# Patient Record
Sex: FEMALE | Race: WHITE | NOT HISPANIC OR LATINO | Employment: OTHER | URBAN - METROPOLITAN AREA
[De-identification: names, ages, dates, MRNs, and addresses within clinical notes are randomized per-mention and may not be internally consistent; named-entity substitution may affect disease eponyms.]

---

## 2017-01-03 ENCOUNTER — TELEPHONE (OUTPATIENT)
Dept: INTERNAL MEDICINE | Facility: CLINIC | Age: 62
End: 2017-01-03

## 2017-01-03 RX ORDER — TRAZODONE HYDROCHLORIDE 100 MG/1
100 TABLET ORAL NIGHTLY
Qty: 90 TABLET | Refills: 1 | Status: SHIPPED | OUTPATIENT
Start: 2017-01-03 | End: 2017-07-18 | Stop reason: SDUPTHER

## 2017-01-03 NOTE — TELEPHONE ENCOUNTER
----- Message from Reddy Gomez sent at 12/30/2016 10:13 AM EST -----  Contact: Verónica Tejada Renewal:  Trazodone 100 mg.  Was taking 1/2 tab but went back to 1 tab at night as she felt the need for it, her script has run out early.  New script will need to be written. [address]  Verónica [facility]) [phone]

## 2017-02-06 ENCOUNTER — OFFICE VISIT (OUTPATIENT)
Dept: INTERNAL MEDICINE | Facility: CLINIC | Age: 62
End: 2017-02-06

## 2017-02-06 VITALS
OXYGEN SATURATION: 97 % | RESPIRATION RATE: 16 BRPM | SYSTOLIC BLOOD PRESSURE: 120 MMHG | HEART RATE: 100 BPM | TEMPERATURE: 99 F | DIASTOLIC BLOOD PRESSURE: 60 MMHG | HEIGHT: 63 IN | WEIGHT: 134 LBS | BODY MASS INDEX: 23.74 KG/M2

## 2017-02-06 DIAGNOSIS — C50.911 MALIGNANT NEOPLASM OF RIGHT FEMALE BREAST, UNSPECIFIED SITE OF BREAST: ICD-10-CM

## 2017-02-06 DIAGNOSIS — G47.9 DYSSOMNIA: ICD-10-CM

## 2017-02-06 DIAGNOSIS — Z00.00 PREVENTATIVE HEALTH CARE: Primary | ICD-10-CM

## 2017-02-06 DIAGNOSIS — I10 ESSENTIAL HYPERTENSION: ICD-10-CM

## 2017-02-06 DIAGNOSIS — K58.9 IRRITABLE BOWEL SYNDROME WITHOUT DIARRHEA: ICD-10-CM

## 2017-02-06 DIAGNOSIS — F33.41 RECURRENT MAJOR DEPRESSIVE DISORDER, IN PARTIAL REMISSION (HCC): ICD-10-CM

## 2017-02-06 PROCEDURE — 99214 OFFICE O/P EST MOD 30 MIN: CPT | Performed by: INTERNAL MEDICINE

## 2017-02-06 PROCEDURE — 99396 PREV VISIT EST AGE 40-64: CPT | Performed by: INTERNAL MEDICINE

## 2017-02-06 RX ORDER — CEFACLOR 500 MG
500 CAPSULE ORAL 3 TIMES DAILY
Qty: 21 CAPSULE | Refills: 0 | Status: SHIPPED | OUTPATIENT
Start: 2017-02-06 | End: 2017-08-14

## 2017-02-06 NOTE — PROGRESS NOTES
Subjective   Etelvina Prescott is a 61 y.o. female.     History of Present Illness     The patient has an extensive adult history of refractory insomnia.  She has failed multiple medications but is done well on trazodone in the last 2 years.  She is required and increased from 50 mg to 100 mg in the last year and feels her sleep quality is markedly improved.  She is also been on amitriptyline at night for many years which stabilizes her sleep quality.  She is using his medications mostly for clinical depression and dysphoria and feels that she is emotionally stable at this time.  Citalopram has greatly stabilized her daytime anxiety as well.  She has used alprazolam for many years but has been able to come off of it in the last 2 years.  She has had an extremely stressful life because her  developed multiple sclerosis 25 years ago and she has maintain the home with his severe disability.  He continued working as an  for many years but has been unemployed over the last 7 years.  They have both stabilized her life in [place].    The patient's had 2 weeks of an upper respiratory cold.  She felt she improved after the first week with over-the-counter medications but has developed marked increasing congestion the last 5 days.  She has right-sided facial pain.  She has drainage and some sore throat.  She has had no headache earaches or deep chest cough.  She has had no fevers chills or sweats.    The following portions of the patient's history were reviewed and updated as appropriate: allergies, current medications, past family history, past medical history, past social history, past surgical history and problem list.    Review of Systems   Constitutional: Negative for appetite change and fatigue.   HENT: Positive for congestion, rhinorrhea, sinus pressure and sore throat.    Respiratory: Positive for cough. Negative for shortness of breath.    Cardiovascular: Negative for chest pain and palpitations.  "  Gastrointestinal: Negative for abdominal distention and nausea.   Neurological: Negative for dizziness and light-headedness.   Psychiatric/Behavioral: Positive for dysphoric mood and sleep disturbance. The patient is nervous/anxious.         Sleep impairment markedly improved on trazodone 100 mg       Objective   Blood pressure 120/60, pulse 100, temperature 99 °F (37.2 °C), temperature source Oral, resp. rate 16, height 63\" (160 cm), weight 134 lb (60.8 kg), SpO2 97 %.    Physical Exam   Constitutional: She is oriented to person, place, and time. She appears well-developed and well-nourished.   HENT:   Right Ear: External ear normal.   Left Ear: External ear normal.   Mouth/Throat: Oropharynx is clear and moist.   Right ear completely obstructed with wax.  Scaly wax is removed with curet.  Aluminum acetate otic instilled.  Canal and TM appear healthy after the procedure.  Moderate right maxillary tenderness   Eyes: EOM are normal. Pupils are equal, round, and reactive to light.   Neck: Normal range of motion. Neck supple. No JVD present.   Cardiovascular: Normal rate, regular rhythm and normal heart sounds.    Pulmonary/Chest: Effort normal. She has no wheezes. She has no rales.   Abdominal: Soft. Bowel sounds are normal. She exhibits no distension. There is no tenderness.   Lymphadenopathy:     She has no cervical adenopathy.   Neurological: She is alert and oriented to person, place, and time. She exhibits normal muscle tone. Coordination normal.   Psychiatric: She has a normal mood and affect. Her behavior is normal. Judgment and thought content normal.   Nursing note and vitals reviewed.    Procedures  Assessment/Plan   Etelvina was seen today for cough.    Diagnoses and all orders for this visit:    Preventative health care    Dyssomnia    Recurrent major depressive disorder, in partial remission    Essential hypertension    Irritable bowel syndrome without diarrhea    Malignant neoplasm of right female " breast, unspecified site of breast    Other orders  -     cefaclor (CECLOR) 500 MG capsule; Take 1 capsule by mouth 3 (Three) Times a Day.      The patient has acute right maxillary sinusitis with progressive headache and discomfort.  Short course of cefaclor along with nasal hygiene should be sufficient.    The patient's insomnia is responding well to trazodone 100 mg.  The combination of trazadone, amitriptyline, and citalopram appear to greatly stabilizing her depression.    The patient's essential hypertension is in excellent control with enalapril alone.  I've asked her to continue a low-salt diet and daily walking.    The patient is now 7 years status post treatment of right breast cancer with chemotherapy and bilateral mastectomies.  She has recently worked with her oncologist who is done specialized testing at the cost of $5000.  The conclusion is that she is high risk of relapsing disease.  For that reason they've decided to continue tamoxifen for 10 years.  I've cautioned the patient on monitoring tamoxifen side effects.    The preventive exam has been reviewed in detail.  The patient has been fully counseled on preventative guidelines for vaccines, cancer screenings, and other health maintenance needs.   The patient has been counseled on guidelines for maintaining a lifestyle to promote good health and to minimize chronic diseases.  The patient has been assisted with scheduling these healthcare procedures for the coming year and given a written document outlining these recommendations.    Patient Instructions   1.  Cefaclor 500 mg every 8 hours for 7 days - to clear sinus infection.    2.  Use nasal saline twice daily for 7 days - to clear congestion.    3.  Continue usual medicines and supplements - as listed.    4.  Follow American Heart Association diet - low in salt and low in sugar.    5.  Walk daily - to build physical fitness.    6.  Use ibuprofen only as needed - for joint aches.    7.  Return in  6 months - annual checkup fasting.      Electronically signed Bryon Young M.D.2/7/2017 8:00 AM

## 2017-02-06 NOTE — PATIENT INSTRUCTIONS
1.  Cefaclor 500 mg every 8 hours for 7 days - to clear sinus infection.    2.  Use nasal saline twice daily for 7 days - to clear congestion.    3.  Continue usual medicines and supplements - as listed.    4.  Follow American Heart Association diet - low in salt and low in sugar.    5.  Walk daily - to build physical fitness.    6.  Use ibuprofen only as needed - for joint aches.    7.  Return in 6 months - annual checkup fasting.

## 2017-03-06 RX ORDER — AMITRIPTYLINE HYDROCHLORIDE 10 MG/1
TABLET, FILM COATED ORAL
Qty: 180 TABLET | Refills: 1 | Status: SHIPPED | OUTPATIENT
Start: 2017-03-06 | End: 2017-04-25 | Stop reason: SDUPTHER

## 2017-04-25 ENCOUNTER — TELEPHONE (OUTPATIENT)
Dept: INTERNAL MEDICINE | Facility: CLINIC | Age: 62
End: 2017-04-25

## 2017-04-25 RX ORDER — AMITRIPTYLINE HYDROCHLORIDE 10 MG/1
20 TABLET, FILM COATED ORAL NIGHTLY
Qty: 180 TABLET | Refills: 1 | Status: SHIPPED | OUTPATIENT
Start: 2017-04-25 | End: 2017-08-14

## 2017-04-25 NOTE — TELEPHONE ENCOUNTER
Fax request for amitryptiline received from [facility], but RX for #[id number] was sent to [facility] on 3/6.  [facility] from [facility].  Pt now wants meds sent to [facility], so per TGF ok to refill.

## 2017-04-27 RX ORDER — CITALOPRAM 20 MG/1
20 TABLET ORAL DAILY
Qty: 90 TABLET | Refills: 1 | Status: SHIPPED | OUTPATIENT
Start: 2017-04-27 | End: 2017-09-28 | Stop reason: SDUPTHER

## 2017-04-27 RX ORDER — ENALAPRIL MALEATE 20 MG/1
10 TABLET ORAL 2 TIMES DAILY
Qty: 90 TABLET | Refills: 1 | Status: SHIPPED | OUTPATIENT
Start: 2017-04-27 | End: 2017-08-04 | Stop reason: SDUPTHER

## 2017-07-18 RX ORDER — TRAZODONE HYDROCHLORIDE 100 MG/1
TABLET ORAL
Qty: 90 TABLET | Refills: 1 | Status: SHIPPED | OUTPATIENT
Start: 2017-07-18 | End: 2018-02-16 | Stop reason: SDUPTHER

## 2017-08-04 ENCOUNTER — TELEPHONE (OUTPATIENT)
Dept: INTERNAL MEDICINE | Facility: CLINIC | Age: 62
End: 2017-08-04

## 2017-08-04 RX ORDER — ENALAPRIL MALEATE 20 MG/1
10 TABLET ORAL 2 TIMES DAILY
Qty: 90 TABLET | Refills: 1 | Status: SHIPPED | OUTPATIENT
Start: 2017-08-04 | End: 2018-02-03 | Stop reason: SDUPTHER

## 2017-08-04 NOTE — TELEPHONE ENCOUNTER
----- Message from Korin Oakley sent at 8/4/2017 11:47 AM EDT -----  Contact: MARGIE   PATIENT SHOULD HAVE A REFILL ON HER ENALAPRIL, BUT [facility] SAID NO SHE DOESN'T???

## 2017-08-14 ENCOUNTER — LAB (OUTPATIENT)
Dept: LAB | Facility: HOSPITAL | Age: 62
End: 2017-08-14

## 2017-08-14 ENCOUNTER — HOSPITAL ENCOUNTER (OUTPATIENT)
Dept: GENERAL RADIOLOGY | Facility: HOSPITAL | Age: 62
Discharge: HOME | End: 2017-08-14
Attending: INTERNAL MEDICINE | Admitting: INTERNAL MEDICINE

## 2017-08-14 ENCOUNTER — OFFICE VISIT (OUTPATIENT)
Dept: INTERNAL MEDICINE | Facility: CLINIC | Age: 62
End: 2017-08-14

## 2017-08-14 VITALS
SYSTOLIC BLOOD PRESSURE: 126 MMHG | BODY MASS INDEX: 23.22 KG/M2 | WEIGHT: 136 LBS | HEIGHT: 64 IN | TEMPERATURE: 98.4 F | DIASTOLIC BLOOD PRESSURE: 74 MMHG | OXYGEN SATURATION: 96 % | HEART RATE: 93 BPM

## 2017-08-14 DIAGNOSIS — E55.9 VITAMIN D DEFICIENCY: ICD-10-CM

## 2017-08-14 DIAGNOSIS — R53.82 CHRONIC FATIGUE: ICD-10-CM

## 2017-08-14 DIAGNOSIS — M25.552 LEFT HIP PAIN: ICD-10-CM

## 2017-08-14 DIAGNOSIS — I10 ESSENTIAL HYPERTENSION: Primary | ICD-10-CM

## 2017-08-14 DIAGNOSIS — G47.9 DYSSOMNIA: ICD-10-CM

## 2017-08-14 DIAGNOSIS — E53.8 COBALAMIN DEFICIENCY: ICD-10-CM

## 2017-08-14 DIAGNOSIS — I34.0 NON-RHEUMATIC MITRAL REGURGITATION: ICD-10-CM

## 2017-08-14 DIAGNOSIS — D86.9 SARCOIDOSIS: ICD-10-CM

## 2017-08-14 DIAGNOSIS — M54.50 ACUTE LEFT-SIDED LOW BACK PAIN WITHOUT SCIATICA: ICD-10-CM

## 2017-08-14 DIAGNOSIS — K58.9 IRRITABLE BOWEL SYNDROME WITHOUT DIARRHEA: ICD-10-CM

## 2017-08-14 DIAGNOSIS — C50.911 MALIGNANT NEOPLASM OF RIGHT FEMALE BREAST, UNSPECIFIED SITE OF BREAST: ICD-10-CM

## 2017-08-14 DIAGNOSIS — F33.41 RECURRENT MAJOR DEPRESSIVE DISORDER, IN PARTIAL REMISSION (HCC): ICD-10-CM

## 2017-08-14 DIAGNOSIS — Z11.59 NEED FOR HEPATITIS C SCREENING TEST: ICD-10-CM

## 2017-08-14 DIAGNOSIS — R87.618 UNEXPLAINED ENDOMETRIAL CELLS ON CERVICAL PAP SMEAR: ICD-10-CM

## 2017-08-14 DIAGNOSIS — E78.5 DYSLIPIDEMIA: ICD-10-CM

## 2017-08-14 LAB
25(OH)D3 SERPL-MCNC: 34 NG/ML
ALBUMIN SERPL-MCNC: 4.7 G/DL (ref 3.2–4.8)
ALBUMIN/GLOB SERPL: 1.6 G/DL (ref 1.5–2.5)
ALP SERPL-CCNC: 55 U/L (ref 25–100)
ALT SERPL W P-5'-P-CCNC: 16 U/L (ref 7–40)
ANION GAP SERPL CALCULATED.3IONS-SCNC: 7 MMOL/L (ref 3–11)
ARTICHOKE IGE QN: 96 MG/DL (ref 0–130)
AST SERPL-CCNC: 23 U/L (ref 0–33)
BACTERIA UR QL AUTO: ABNORMAL /HPF
BASOPHILS # BLD AUTO: 0.04 10*3/MM3 (ref 0–0.2)
BASOPHILS NFR BLD AUTO: 0.6 % (ref 0–1)
BILIRUB SERPL-MCNC: 0.5 MG/DL (ref 0.3–1.2)
BILIRUB UR QL STRIP: NEGATIVE
BUN BLD-MCNC: 14 MG/DL (ref 9–23)
BUN/CREAT SERPL: 20 (ref 7–25)
CALCIUM SPEC-SCNC: 9.6 MG/DL (ref 8.7–10.4)
CHLORIDE SERPL-SCNC: 104 MMOL/L (ref 99–109)
CHOLEST SERPL-MCNC: 192 MG/DL (ref 0–200)
CLARITY UR: CLEAR
CO2 SERPL-SCNC: 30 MMOL/L (ref 20–31)
COLOR UR: YELLOW
CREAT BLD-MCNC: 0.7 MG/DL (ref 0.6–1.3)
CRP SERPL-MCNC: 0.06 MG/DL (ref 0–1)
DEPRECATED RDW RBC AUTO: 45.1 FL (ref 37–54)
EOSINOPHIL # BLD AUTO: 0.07 10*3/MM3 (ref 0–0.3)
EOSINOPHIL NFR BLD AUTO: 1.1 % (ref 0–3)
ERYTHROCYTE [DISTWIDTH] IN BLOOD BY AUTOMATED COUNT: 13.2 % (ref 11.3–14.5)
GFR SERPL CREATININE-BSD FRML MDRD: 84 ML/MIN/1.73
GLOBULIN UR ELPH-MCNC: 3 GM/DL
GLUCOSE BLD-MCNC: 90 MG/DL (ref 70–100)
GLUCOSE UR STRIP-MCNC: NEGATIVE MG/DL
HCT VFR BLD AUTO: 40.4 % (ref 34.5–44)
HCV AB SER DONR QL: NORMAL
HDLC SERPL-MCNC: 81 MG/DL (ref 40–60)
HGB BLD-MCNC: 13.2 G/DL (ref 11.5–15.5)
HGB UR QL STRIP.AUTO: NEGATIVE
HYALINE CASTS UR QL AUTO: ABNORMAL /LPF
IMM GRANULOCYTES # BLD: 0.02 10*3/MM3 (ref 0–0.03)
IMM GRANULOCYTES NFR BLD: 0.3 % (ref 0–0.6)
KETONES UR QL STRIP: NEGATIVE
LEUKOCYTE ESTERASE UR QL STRIP.AUTO: ABNORMAL
LYMPHOCYTES # BLD AUTO: 1.64 10*3/MM3 (ref 0.6–4.8)
LYMPHOCYTES NFR BLD AUTO: 26.2 % (ref 24–44)
MCH RBC QN AUTO: 30.1 PG (ref 27–31)
MCHC RBC AUTO-ENTMCNC: 32.7 G/DL (ref 32–36)
MCV RBC AUTO: 92.2 FL (ref 80–99)
MONOCYTES # BLD AUTO: 0.33 10*3/MM3 (ref 0–1)
MONOCYTES NFR BLD AUTO: 5.3 % (ref 0–12)
NEUTROPHILS # BLD AUTO: 4.15 10*3/MM3 (ref 1.5–8.3)
NEUTROPHILS NFR BLD AUTO: 66.5 % (ref 41–71)
NITRITE UR QL STRIP: NEGATIVE
PH UR STRIP.AUTO: 6 [PH] (ref 5–8)
PLATELET # BLD AUTO: 281 10*3/MM3 (ref 150–450)
PMV BLD AUTO: 10.4 FL (ref 6–12)
POTASSIUM BLD-SCNC: 3.9 MMOL/L (ref 3.5–5.5)
PROT SERPL-MCNC: 7.7 G/DL (ref 5.7–8.2)
PROT UR QL STRIP: NEGATIVE
RBC # BLD AUTO: 4.38 10*6/MM3 (ref 3.89–5.14)
RBC # UR: ABNORMAL /HPF
REF LAB TEST METHOD: ABNORMAL
SODIUM BLD-SCNC: 141 MMOL/L (ref 132–146)
SP GR UR STRIP: 1.02 (ref 1–1.03)
SQUAMOUS #/AREA URNS HPF: ABNORMAL /HPF
TRIGL SERPL-MCNC: 116 MG/DL (ref 0–150)
TSH SERPL DL<=0.05 MIU/L-ACNC: 1.04 MIU/ML (ref 0.35–5.35)
UROBILINOGEN UR QL STRIP: ABNORMAL
VIT B12 BLD-MCNC: 693 PG/ML (ref 211–911)
WBC NRBC COR # BLD: 6.25 10*3/MM3 (ref 3.5–10.8)
WBC UR QL AUTO: ABNORMAL /HPF

## 2017-08-14 PROCEDURE — 85025 COMPLETE CBC W/AUTO DIFF WBC: CPT | Performed by: INTERNAL MEDICINE

## 2017-08-14 PROCEDURE — 93000 ELECTROCARDIOGRAM COMPLETE: CPT | Performed by: INTERNAL MEDICINE

## 2017-08-14 PROCEDURE — 84443 ASSAY THYROID STIM HORMONE: CPT | Performed by: INTERNAL MEDICINE

## 2017-08-14 PROCEDURE — 86803 HEPATITIS C AB TEST: CPT | Performed by: INTERNAL MEDICINE

## 2017-08-14 PROCEDURE — 82306 VITAMIN D 25 HYDROXY: CPT | Performed by: INTERNAL MEDICINE

## 2017-08-14 PROCEDURE — 73502 X-RAY EXAM HIP UNI 2-3 VIEWS: CPT

## 2017-08-14 PROCEDURE — 80053 COMPREHEN METABOLIC PANEL: CPT | Performed by: INTERNAL MEDICINE

## 2017-08-14 PROCEDURE — 81001 URINALYSIS AUTO W/SCOPE: CPT | Performed by: INTERNAL MEDICINE

## 2017-08-14 PROCEDURE — 86140 C-REACTIVE PROTEIN: CPT | Performed by: INTERNAL MEDICINE

## 2017-08-14 PROCEDURE — 80061 LIPID PANEL: CPT | Performed by: INTERNAL MEDICINE

## 2017-08-14 PROCEDURE — 72100 X-RAY EXAM L-S SPINE 2/3 VWS: CPT

## 2017-08-14 PROCEDURE — 99215 OFFICE O/P EST HI 40 MIN: CPT | Performed by: INTERNAL MEDICINE

## 2017-08-14 PROCEDURE — 36415 COLL VENOUS BLD VENIPUNCTURE: CPT | Performed by: INTERNAL MEDICINE

## 2017-08-14 PROCEDURE — 82607 VITAMIN B-12: CPT | Performed by: INTERNAL MEDICINE

## 2017-08-14 RX ORDER — AMITRIPTYLINE HYDROCHLORIDE 10 MG/1
10 TABLET, FILM COATED ORAL NIGHTLY
Qty: 180 TABLET | Refills: 1
Start: 2017-08-14 | End: 2017-09-28 | Stop reason: DRUGHIGH

## 2017-08-14 NOTE — PROGRESS NOTES
Subjective   Etelvina Prescott is a 62 y.o. female.     Chief Complaint   Patient presents with   • Annual Exam       History of Present Illness     The patient fell down several stairs on May [address] her low back and cutting her scalp.  She has had progressive left hip pain since then and now has a limp.  Her low back is stiff and achy and she has some pain down to the calf.  She has never had significant back or arthritic disability in past years.  She has taken increasing ibuprofen for pain control perhaps forced tablets to 6 tablets daily.  She has not sought medical treatment.  She has become more disabled from activities of daily living at home, although she must maintain her home as she lives with  with paraplegia.    The following portions of the patient's history were reviewed and updated as appropriate: allergies, current medications, past family history, past medical history, past social history, past surgical history and problem list.    Active Ambulatory Problems     Diagnosis Date Noted   • Atopic rhinitis 06/06/2016   • Impacted cerumen 06/06/2016   • Osteoarthritis of cervical spine 06/06/2016   • Depression 06/06/2016   • Tear film insufficiency 06/06/2016   • Headache 06/06/2016   • Hypertension 06/06/2016   • Irritable bowel syndrome 06/06/2016   • Sarcoidosis 06/06/2016   • Herpes zoster 06/06/2016   • Dyssomnia 06/06/2016   • Cobalamin deficiency 06/06/2016   • Vitamin D deficiency 06/06/2016   • Breast cancer    • Preventative health care 06/07/2016   • Mitral regurgitation 08/12/2016   • Low back pain 08/14/2017   • Left hip pain 08/14/2017   • Chronic fatigue 08/14/2017     Resolved Ambulatory Problems     Diagnosis Date Noted   • Right lower quadrant abdominal pain 06/06/2016   • Malignant neoplasm of breast 06/06/2016   • Dysuria 06/06/2016     Past Medical History:   Diagnosis Date   • Allergic rhinitis    • Anxiety    • Breast cancer 2010   • Chronic fatigue    • Constipation     • Depression    • Herpes zoster    • History of exposure to tuberculosis    • Hypertension    • Impacted cerumen    • Irritable bowel syndrome    • MVP (mitral valve prolapse)    • Nephrolithiasis    • Plantar fasciitis    • Sarcoidosis    • Sleep disturbances    • Uterine bleeding      Past Surgical History:   Procedure Laterality Date   • BREAST BIOPSY Right 1988    Benign   •  SECTION   &    • DILATATION AND CURETTAGE     • HYSTEROSCOPY ENDOMETRIAL ABLATION  2006    persistant bleeding   • MASTECTOMY Bilateral 2010    bilateral with implants, breast cancer on the right     Family History   Problem Relation Age of Onset   • Alzheimer's disease Mother       age 89   • Hypothyroidism Mother    • Osteoporosis Mother    • Pulmonary embolism Mother    • Coronary artery disease Father    • Dementia Father    • Diabetes Father    • Hypertension Father    • Heart attack Father    • Hypothyroidism Sister    • Hypertension Sister    • Breast cancer Maternal Grandmother    • Stomach cancer Paternal Aunt    • Lung cancer Paternal Aunt      Social History     Social History   • Marital status:      Spouse name: N/A   • Number of children: N/A   • Years of education: N/A     Occupational History   • Not on file.     Social History Main Topics   • Smoking status: Never Smoker   • Smokeless tobacco: Never Used   • Alcohol use 2.4 oz/week     4 Glasses of wine per week   • Drug use: No   • Sexual activity: Not on file     Other Topics Concern   • Not on file     Social History Narrative    Domestic life   lives in private home with .   has advanced MS with paraplegia.        Gnosticism    Mosque        Sleep hygiene    in bed midnight to 8 AM for 7 or 8 hours of sleep        Caffeine use    2 cups of coffee daily        Exercise habits    walks 30 minutes 4 days weekly.  Upper body strengthening 3 days weekly        Diet   well-balanced low salt diet      "   Occupation   lifelong .  Currently working part time.        Hearing : No impairment        Vision : Corrects with trifocal glasses        Driving : No limitations             Review of Systems   Constitutional: Positive for fatigue. Negative for appetite change.   HENT: Negative for ear pain and sore throat.    Eyes: Negative for itching and visual disturbance.   Respiratory: Negative for cough and shortness of breath.    Cardiovascular: Negative for chest pain and palpitations.   Gastrointestinal: Negative for abdominal pain and nausea.   Endocrine: Negative for cold intolerance and heat intolerance.   Genitourinary: Negative for dysuria and hematuria.   Musculoskeletal: Negative for arthralgias and back pain.        Left hip pain and low back pain persist after a fall down staircase on May 19.   Skin: Negative for rash and wound.   Allergic/Immunologic: Negative for environmental allergies and food allergies.   Neurological: Negative for dizziness and headaches.   Hematological: Negative for adenopathy. Does not bruise/bleed easily.   Psychiatric/Behavioral: Positive for dysphoric mood and sleep disturbance. The patient is not nervous/anxious.         Sleep impairment and depression well compensated       Objective   Blood pressure 126/74, pulse 93, temperature 98.4 °F (36.9 °C), temperature source Oral, height 64\" (162.6 cm), weight 136 lb (61.7 kg), SpO2 96 %.    Physical Exam   Constitutional: She is oriented to person, place, and time. She appears well-developed and well-nourished. No distress.   HENT:   Right Ear: External ear normal.   Left Ear: External ear normal.   Nose: Nose normal.   Mouth/Throat: Oropharynx is clear and moist.   Eyes: EOM are normal. Pupils are equal, round, and reactive to light. No scleral icterus.   Neck: Normal range of motion. Neck supple. No JVD present. No thyromegaly present.   Cardiovascular: Normal rate, regular rhythm and intact distal pulses.  "   Systolic murmur at apex   Pulmonary/Chest: Effort normal and breath sounds normal. She has no wheezes. She has no rales.   Abdominal: Soft. Bowel sounds are normal. She exhibits no distension and no mass. There is no tenderness.   Musculoskeletal: Normal range of motion. She exhibits no edema or tenderness.   Moderate bunion left foot   Lymphadenopathy:     She has no cervical adenopathy.   Neurological: She is alert and oriented to person, place, and time.   Vibratory normal  Romberg negative  Gait normal  Plantars downgoing     Skin: Skin is warm and dry. No rash noted.   Bilateral breast implants without nodularity - status post bilateral mastectomies   Psychiatric: She has a normal mood and affect. Her behavior is normal. Judgment and thought content normal.   Nursing note and vitals reviewed.      ECG 12 Lead  Date/Time: 8/14/2017 9:46 AM  Performed by: BRYON YOUNG  Authorized by: BRYON YOUNG   Interpreted by ED physician: Bryon Young M.D.  Comparison: compared with previous ECG from 8/12/2016  Similar to previous ECG  Rhythm: sinus rhythm  Rate: normal  BPM: 80  Conduction: conduction normal  ST Segments: ST segments normal  T Waves: T waves normal  QRS axis: normal  Other findings: PRWP  Clinical impression: normal ECG  Comments: Indication - mitral regurgitation  Baseline EKG          Assessment/Plan   Etelvina was seen today for annual exam.    Diagnoses and all orders for this visit:    Essential hypertension  -     Urinalysis With / Microscopic If Indicated  -     Urinalysis, Microscopic Only; Future  -     Urinalysis, Microscopic Only    Non-rheumatic mitral regurgitation    Irritable bowel syndrome without diarrhea  -     CBC & Differential  -     C-reactive Protein  -     ECG 12 Lead  -     CBC Auto Differential    Cobalamin deficiency  -     Vitamin B12    Vitamin D deficiency  -     Vitamin D 25 Hydroxy    Malignant neoplasm of right female breast, unspecified site of  breast    Recurrent major depressive disorder, in partial remission    Dyssomnia    Sarcoidosis    Acute left-sided low back pain without sciatica  -     XR Spine Lumbar 2 or 3 View    Left hip pain  -     XR Hip With or Without Pelvis 2 - 3 View Left    Chronic fatigue  -     TSH    Dyslipidemia  -     Comprehensive Metabolic Panel  -     Lipid Panel    Need for hepatitis C screening test  -     Hepatitis C Antibody    Other orders  -     amitriptyline (ELAVIL) 10 MG tablet; Take 1 tablet by mouth Every Night.      The patient's left hip pain is most consistent with a psoas syndrome and probable lumbosacral strain causing sciatica.  She should respond to physical therapy rehabilitation.  She is high risk for becoming progressively disabled.    The patient has chronic depression and sleep disturbance over 25 years.  She has done better this summer.  I've asked her to lower her amitriptyline dose again and eventually lie on trazodone alone for sleep.  I've asked her to continue on citalopram on a permanent basis.    Patient's 7 years status post left breast cancer.  She's been asked to stay on tamoxifen for 10 years by her oncologist.  She seems to be coping well with the antiestrogen properties.    The patient has many years of essential hypertension.  She is doing well on enalapril alone.  She has had reaction to sulfa antibiotics and may require a calcium blocker for further management if needed.    The patient has moderate allergic rhinitis with congestion.  She is doing well on Allegra and Flonase.  I've asked her to exhibit environmental control.    Patient Instructions   1.  Decrease amitriptyline 10 mg at night - to improve fatigue.    2.  Consider trazodone 150 MG at bedtime - to improve sleep.    3.  X-rays of low back and left hip - now.    4.  Consider Physical therapy for lower back and pelvis muscles - this summer.    5.  Consider cortisone shot - to improve left hip pain.    6.  Low back exercises  every morning - for the rest of this year.    7.  Follow well-balanced diet - low in salt and low in sugar.    8.  Check blood pressure every month - record readings.    9.  Speak to the nurse Wednesday morning - about test results.    10.  Return visit November - fasting checkup.    11.  All laboratory tests are acceptable and require no change in treatment.    12.  Lumbar spine shows moderate disc disease.  Left hip shows minimal arthritis.    13.  Proceed with physical therapy for low back and pelvic rehabilitation.    Current Outpatient Prescriptions:   •  amitriptyline (ELAVIL) 10 MG tablet, Take 1 tablet by mouth Every Night., Disp: 180 tablet, Rfl: 1  •  Cholecalciferol (VITAMIN D-3) 1000 UNITS capsule, Take 1 capsule by mouth., Disp: , Rfl:   •  citalopram (CeleXA) 20 MG tablet, Take 1 tablet by mouth Daily., Disp: 90 tablet, Rfl: 1  •  cycloSPORINE (RESTASIS) 0.05 % ophthalmic emulsion, Apply 1 drop to eye Every 12 (Twelve) Hours., Disp: , Rfl:   •  enalapril (VASOTEC) 20 MG tablet, Take 0.5 tablets by mouth 2 (Two) Times a Day., Disp: 90 tablet, Rfl: 1  •  fexofenadine (ALLEGRA) 180 MG tablet, Take 1 tablet by mouth Daily As Needed., Disp: , Rfl:   •  fluticasone (FLONASE) 50 MCG/ACT nasal spray, 2 sprays into each nostril daily., Disp: 3 each, Rfl: 3  •  ibuprofen (ADVIL,MOTRIN) 400 MG tablet, TAKE ONE TABLET BY MOUTH TWICE A DAY, Disp: 180 tablet, Rfl: 1  •  Multiple Vitamin (MULTIVITAMIN) capsule, Take  by mouth daily., Disp: , Rfl:   •  Omega-3 Fatty Acids (FISH OIL) 1000 MG capsule capsule, Take  by mouth daily., Disp: , Rfl:   •  polyvinyl alcohol-povidone (REFRESH) 1.4-0.6 % ophthalmic solution, Apply 1 drop to eye 2 (two) times a day., Disp: , Rfl:   •  Probiotic Product (PROBIOTIC DAILY) capsule, Take  by mouth., Disp: , Rfl:   •  tamoxifen (NOLVADEX) 20 MG chemo tablet, Take  by mouth daily., Disp: , Rfl:   •  traZODone (DESYREL) 100 MG tablet, TAKE ONE TABLET BY MOUTH EVERY NIGHT, Disp: 90  tablet, Rfl: 1  •  vitamin B-12 (CYANOCOBALAMIN) 2500 MCG sublingual tablet tablet, Place  under the tongue daily., Disp: , Rfl:     Allergies   Allergen Reactions   • Penicillins Rash   • Sulfa Antibiotics Rash       Immunization History   Administered Date(s) Administered   • Influenza, Quadrivalent 11/14/2013, 10/01/2016   • PPD Test 01/01/2011, 08/03/2015, 06/07/2016, 06/07/2016   • Tdap 05/30/2013   • Zoster 02/10/2014     Electronically signed Bryon Young M.D.8/15/2017 6:42 AM

## 2017-08-14 NOTE — PATIENT INSTRUCTIONS
1.  Decrease amitriptyline 10 mg at night - to improve fatigue.    2.  Consider trazodone 150 MG at bedtime - to improve sleep.    3.  X-rays of low back and left hip - now.    4.  Consider Physical therapy for lower back and pelvis muscles - this summer.    5.  Consider cortisone shot - to improve left hip pain.    6.  Low back exercises every morning - for the rest of this year.    7.  Follow well-balanced diet - low in salt and low in sugar.    8.  Check blood pressure every month - record readings.    9.  Speak to the nurse Wednesday morning - about test results.    10.  Return visit November - fasting checkup.

## 2017-08-16 ENCOUNTER — TELEPHONE (OUTPATIENT)
Dept: INTERNAL MEDICINE | Facility: CLINIC | Age: 62
End: 2017-08-16

## 2017-08-16 NOTE — TELEPHONE ENCOUNTER
Called pt re: recent labs.   Per TGF - All laboratory tests are acceptable and require no change in treatment.  Lumbar spine shows moderate disc disease.  Left hip shows minimal arthritis.  Proceed with physical therapy for low back and pelvic rehabilitation.  Pt verb understanding and wants to see what PT is covered under her insurance.

## 2017-09-28 RX ORDER — CITALOPRAM 20 MG/1
TABLET ORAL
Qty: 90 TABLET | Refills: 1 | Status: SHIPPED | OUTPATIENT
Start: 2017-09-28 | End: 2018-04-30 | Stop reason: SDUPTHER

## 2017-09-28 RX ORDER — AMITRIPTYLINE HYDROCHLORIDE 10 MG/1
10 TABLET, FILM COATED ORAL NIGHTLY
Qty: 90 TABLET | Refills: 1 | Status: SHIPPED | OUTPATIENT
Start: 2017-09-28 | End: 2018-03-27 | Stop reason: SDUPTHER

## 2017-12-08 ENCOUNTER — OFFICE VISIT (OUTPATIENT)
Dept: INTERNAL MEDICINE | Facility: CLINIC | Age: 62
End: 2017-12-08

## 2017-12-08 VITALS
RESPIRATION RATE: 16 BRPM | BODY MASS INDEX: 23.17 KG/M2 | WEIGHT: 135 LBS | HEART RATE: 84 BPM | OXYGEN SATURATION: 97 % | TEMPERATURE: 98.8 F | DIASTOLIC BLOOD PRESSURE: 70 MMHG | SYSTOLIC BLOOD PRESSURE: 126 MMHG

## 2017-12-08 DIAGNOSIS — Z00.00 PREVENTATIVE HEALTH CARE: ICD-10-CM

## 2017-12-08 DIAGNOSIS — M47.816 LUMBAR SPONDYLOSIS: ICD-10-CM

## 2017-12-08 DIAGNOSIS — M54.50 ACUTE LEFT-SIDED LOW BACK PAIN WITHOUT SCIATICA: ICD-10-CM

## 2017-12-08 DIAGNOSIS — I10 ESSENTIAL HYPERTENSION: ICD-10-CM

## 2017-12-08 DIAGNOSIS — G47.9 DYSSOMNIA: Primary | ICD-10-CM

## 2017-12-08 DIAGNOSIS — M25.552 LEFT HIP PAIN: ICD-10-CM

## 2017-12-08 PROCEDURE — 90662 IIV NO PRSV INCREASED AG IM: CPT | Performed by: INTERNAL MEDICINE

## 2017-12-08 PROCEDURE — 99213 OFFICE O/P EST LOW 20 MIN: CPT | Performed by: INTERNAL MEDICINE

## 2017-12-08 PROCEDURE — 90471 IMMUNIZATION ADMIN: CPT | Performed by: INTERNAL MEDICINE

## 2017-12-08 RX ORDER — IBUPROFEN 400 MG/1
400 TABLET ORAL 2 TIMES DAILY PRN
Qty: 180 TABLET | Refills: 1
Start: 2017-12-08 | End: 2017-12-19 | Stop reason: SDUPTHER

## 2017-12-08 NOTE — PATIENT INSTRUCTIONS
"1.  Kenneth exercises 5 minutes every morning - for back strength and flexibility.    2.  Obtain the book \" treat your own back \" by Reddy Moore - develop a back rehabilitation program.    3.  Continue usual medicines and supplements - as listed.    4.  Follow a well-balanced diet - low in salt and low in sugar.    5.  Return visit April - fasting checkup.  "

## 2017-12-08 NOTE — PROGRESS NOTES
Subjective   Etelvina Prescott is a 62 y.o. female.     History of Present Illness     The patient has many years of essential hypertension.  She has associated mitral valve prolapse with regurgitation.  Blood pressures of and averaging 125 systolic.  She follows a low-salt diet and takes enalapril on a daily basis.  She has had no palpitations or chest pains.    The following portions of the patient's history were reviewed and updated as appropriate: allergies, current medications, past family history, past medical history, past social history, past surgical history and problem list.    Review of Systems   Constitutional: Negative for appetite change and fatigue.   Gastrointestinal: Negative for abdominal distention and nausea.   Musculoskeletal: Positive for back pain.        Recent severe left hip pain markedly improved   Neurological: Negative for dizziness and light-headedness.   Psychiatric/Behavioral: Positive for decreased concentration and sleep disturbance. The patient is nervous/anxious.         Depression and sleep responding well to medication       Objective   Blood pressure 126/70, pulse 84, temperature 98.8 °F (37.1 °C), temperature source Oral, resp. rate 16, weight 61.2 kg (135 lb), SpO2 97 %.    Physical Exam   Constitutional: She is oriented to person, place, and time. She appears well-developed and well-nourished. No distress.   Cardiovascular: Normal rate and regular rhythm.    Systolic murmur at apex   Pulmonary/Chest: Effort normal and breath sounds normal. She has no wheezes. She has no rales.   Neurological: She is alert and oriented to person, place, and time. She exhibits normal muscle tone. Coordination normal.   Psychiatric: She has a normal mood and affect. Her behavior is normal. Judgment and thought content normal.   Nursing note and vitals reviewed.    Procedures  Assessment/Plan   Etelvina was seen today for hypertension.    Diagnoses and all orders for this  "visit:    [facility]    Preventative health care    Essential hypertension    Left hip pain    Acute left-sided low back pain without sciatica    Lumbar spondylosis    Other orders  -     ibuprofen (ADVIL,MOTRIN) 400 MG tablet; Take 1 tablet by mouth 2 (Two) Times a Day As Needed for Mild Pain .  -     Flu Vaccine High Dose PF 65YR+ ([id number])    Patient's blood pressure has been well controlled.  She is cardiovascularly stable on exam.    The patient's had intermittent left hip pain which apparently is referred from the back.  She is improving.  X-rays this summer showed moderate lumbar spondylosis and a mild scoliosis.  I've cautioned her to exercise her back every morning to promote strength, endurance, flexibility, and posture.    The patient has many years of sleep disturbance and a clinical depression.  Her current medications are working well.  She has constant degree of anxiety associated with her 's disability.    Patient Instructions   1.  Kenneth exercises 5 minutes every morning - for back strength and flexibility.    2.  Obtain the book \" treat your own back \" by Reddy Moore - develop a back rehabilitation program.    3.  Continue usual medicines and supplements - as listed.    4.  Follow a well-balanced diet - low in salt and low in sugar.    5.  Return visit April - fasting checkup.    Electronically signed Bryon Young M.D.12/10/2017 2:42 PM            "

## 2017-12-11 ENCOUNTER — TELEPHONE (OUTPATIENT)
Dept: INTERNAL MEDICINE | Facility: CLINIC | Age: 62
End: 2017-12-11

## 2017-12-11 NOTE — TELEPHONE ENCOUNTER
Since Fri night - chills, cough, runny nose, sinus h/a, no appetite.  Per TGF, likely viral, take mucinex bid, nasal saline bid, tylenol prn, get plenty of fluids, monitor temp, call end of week if no better. Pt verb understanding.

## 2017-12-11 NOTE — TELEPHONE ENCOUNTER
----- Message from Korin Oakley sent at 12/11/2017  9:13 AM EST -----  Contact: NICHOLAS- [phone]  PATIENT HAS BEEN SICK SINCE Friday- COUGH, HEADACHE, CONGESTION. SHE WOULD LIKE AN ANTIBIOTIC CALLED INTO [facility] [address]

## 2017-12-12 ENCOUNTER — OFFICE VISIT (OUTPATIENT)
Dept: INTERNAL MEDICINE | Facility: CLINIC | Age: 62
End: 2017-12-12

## 2017-12-12 ENCOUNTER — TELEPHONE (OUTPATIENT)
Dept: INTERNAL MEDICINE | Facility: CLINIC | Age: 62
End: 2017-12-12

## 2017-12-12 VITALS
HEART RATE: 96 BPM | WEIGHT: 134 LBS | OXYGEN SATURATION: 95 % | DIASTOLIC BLOOD PRESSURE: 70 MMHG | BODY MASS INDEX: 23 KG/M2 | RESPIRATION RATE: 16 BRPM | SYSTOLIC BLOOD PRESSURE: 130 MMHG | TEMPERATURE: 99.5 F

## 2017-12-12 DIAGNOSIS — J20.8 VIRAL BRONCHITIS: ICD-10-CM

## 2017-12-12 DIAGNOSIS — J06.9 VIRAL URI: ICD-10-CM

## 2017-12-12 DIAGNOSIS — R05.9 COUGH: Primary | ICD-10-CM

## 2017-12-12 LAB
EXPIRATION DATE: NORMAL
FLUAV AG NPH QL: NEGATIVE
FLUBV AG NPH QL: NEGATIVE
INTERNAL CONTROL: NORMAL
Lab: NORMAL

## 2017-12-12 PROCEDURE — 87804 INFLUENZA ASSAY W/OPTIC: CPT | Performed by: INTERNAL MEDICINE

## 2017-12-12 PROCEDURE — 99213 OFFICE O/P EST LOW 20 MIN: CPT | Performed by: INTERNAL MEDICINE

## 2017-12-12 RX ORDER — ACETAMINOPHEN AND CODEINE PHOSPHATE 300; 30 MG/1; MG/1
1 TABLET ORAL 3 TIMES DAILY PRN
Qty: 15 TABLET | Refills: 0 | OUTPATIENT
Start: 2017-12-12 | End: 2018-05-03

## 2017-12-12 NOTE — PATIENT INSTRUCTIONS
1.  Tylenol 3 at bedtime as needed - for cough.    2.  May take Tylenol 3 - 3 tablets in 24 hours.    3.  Plain Mucinex twice daily - for 7 days.    4.  Tylenol 500 - 2 tablets twice daily as needed - for chills and aches.    5.  Call the office in 3 days or 6 days as needed - for poor progress.    6.  Rest at home - prevent spreading infection.    7.  Return visit April 17 - fasting checkup.

## 2017-12-12 NOTE — TELEPHONE ENCOUNTER
Per TGF, if pt feels really bad, she may come in today. Otherwise can order zpack and ox Fri. Pt would like to come in today - sched for 3:30 pm.

## 2017-12-12 NOTE — PROGRESS NOTES
Subjective   Etelvina Prescott is a 62 y.o. female.     History of Present Illness     The patient's had 4 days of progressive cough and head congestion chills and headache.  She has taken only Tylenol and Mucinex.  She has had a persistent cough which is keeping her awake at night.  She knows of no particular new exposures.    The following portions of the patient's history were reviewed and updated as appropriate: allergies, current medications, past family history, past medical history, past social history, past surgical history and problem list.    Review of Systems   Constitutional: Positive for chills. Negative for appetite change and fatigue.   HENT: Positive for congestion and sore throat.    Respiratory: Positive for cough and shortness of breath. Negative for wheezing.    Cardiovascular: Negative for chest pain and palpitations.   Gastrointestinal: Negative for abdominal distention and nausea.   Neurological: Positive for headaches. Negative for dizziness and light-headedness.       Objective   Blood pressure 130/70, pulse 96, temperature 99.5 °F (37.5 °C), temperature source Oral, resp. rate 16, weight 60.8 kg (134 lb), SpO2 95 %.    Physical Exam   Constitutional: She is oriented to person, place, and time. She appears well-developed and well-nourished.   HENT:   Right Ear: External ear normal.   Left Ear: External ear normal.   Mild erythema oropharynx   Eyes: Conjunctivae and EOM are normal. Pupils are equal, round, and reactive to light.   Neck: Normal range of motion. Neck supple. No JVD present.   Cardiovascular: Normal rate, regular rhythm and normal heart sounds.    Pulmonary/Chest: Effort normal and breath sounds normal. She has no wheezes. She has no rales.   Neurological: She is alert and oriented to person, place, and time.   Psychiatric: She has a normal mood and affect. Her behavior is normal. Judgment and thought content normal.   Nursing note and vitals  reviewed.    Procedures  Assessment/Plan   Etelvina was seen today for cough.    Diagnoses and all orders for this visit:    Cough  -     POCT Influenza A/B    Viral bronchitis    Viral URI    Other orders  -     acetaminophen-codeine (TYLENOL #3) 300-30 MG per tablet; Take 1 tablet by mouth 3 (Three) Times a Day As Needed for Moderate Pain .    The patient has an acute viral bronchitis.  She has moderate aortic irritation and should benefit from Tylenol 3 at bedtime.      Mucinex and plain Tylenol should be her mainstays of symptom control.  The patient should stay mobile and prevent excess congestion due to trigger a secondary infection.    Patient Instructions   1.  Tylenol 3 at bedtime as needed - for cough.    2.  May take Tylenol 3 - 3 tablets in 24 hours.    3.  Plain Mucinex twice daily - for 7 days.    4.  Tylenol 500 - 2 tablets twice daily as needed - for chills and aches.    5.  Call the office in 3 days or 6 days as needed - for poor progress.    6.  Rest at home - prevent spreading infection.    7.  Return visit April 17 - fasting checkup.    8.  Influenza screen negative.    Electronically signed Bryon Young M.D.12/15/2017 7:15 AM

## 2017-12-19 RX ORDER — IBUPROFEN 400 MG/1
400 TABLET ORAL 2 TIMES DAILY PRN
Qty: 180 TABLET | Refills: 1 | Status: SHIPPED | OUTPATIENT
Start: 2017-12-19

## 2017-12-20 ENCOUNTER — TELEPHONE (OUTPATIENT)
Dept: INTERNAL MEDICINE | Facility: CLINIC | Age: 62
End: 2017-12-20

## 2017-12-20 RX ORDER — AZITHROMYCIN 250 MG/1
TABLET, FILM COATED ORAL
Qty: 6 TABLET | Refills: 0 | Status: SHIPPED | OUTPATIENT
Start: 2017-12-20 | End: 2018-05-03

## 2017-12-20 NOTE — TELEPHONE ENCOUNTER
----- Message from Reddy Gomez sent at 12/20/2017  9:09 AM EST -----  Contact: MARGIE ROMERO COMPLAINT:  WAS IN LAST WEEK FOR COUGH, WAS GIVING COUGH SYRUP W/CODINE.  NOW HAS HEADACHE, EARS HURTING, DRAINAGE, CHEST HURTS AND BURNS FROM COUGHING.  TGF TOLD HER THAT HE WOULD CALL IN AN ANTIBIOTIC IF A SECONDARY INFECTION STARTED AND SHE WOULDN'T HAVE TO COME IN.  [facility] [address]  HOME [phone] 1-1.5 HR CELL AFTER THAT [phone]

## 2017-12-20 NOTE — TELEPHONE ENCOUNTER
Says productive cough with headache and sinus drainage continue.  Per TGF - Zpack.  Pt verb understanding.

## 2018-02-05 RX ORDER — ENALAPRIL MALEATE 20 MG/1
TABLET ORAL
Qty: 90 TABLET | Refills: 1 | Status: SHIPPED | OUTPATIENT
Start: 2018-02-05 | End: 2018-04-30 | Stop reason: SDUPTHER

## 2018-02-16 RX ORDER — TRAZODONE HYDROCHLORIDE 100 MG/1
TABLET ORAL
Qty: 90 TABLET | Refills: 1 | Status: SHIPPED | OUTPATIENT
Start: 2018-02-16 | End: 2018-11-07 | Stop reason: SDUPTHER

## 2018-03-27 RX ORDER — AMITRIPTYLINE HYDROCHLORIDE 10 MG/1
10 TABLET, FILM COATED ORAL NIGHTLY
Qty: 90 TABLET | Refills: 1 | Status: SHIPPED | OUTPATIENT
Start: 2018-03-27 | End: 2018-10-29 | Stop reason: SDUPTHER

## 2018-04-30 RX ORDER — CITALOPRAM 20 MG/1
20 TABLET ORAL DAILY
Qty: 90 TABLET | Refills: 1 | Status: SHIPPED | OUTPATIENT
Start: 2018-04-30 | End: 2018-05-03 | Stop reason: SDUPTHER

## 2018-04-30 RX ORDER — ENALAPRIL MALEATE 20 MG/1
10 TABLET ORAL 2 TIMES DAILY
Qty: 90 TABLET | Refills: 1 | Status: SHIPPED | OUTPATIENT
Start: 2018-04-30 | End: 2018-05-10 | Stop reason: SDUPTHER

## 2018-05-03 ENCOUNTER — LAB REQUISITION (OUTPATIENT)
Dept: LAB | Facility: HOSPITAL | Age: 63
End: 2018-05-03

## 2018-05-03 ENCOUNTER — OFFICE VISIT (OUTPATIENT)
Dept: INTERNAL MEDICINE | Facility: CLINIC | Age: 63
End: 2018-05-03

## 2018-05-03 VITALS
OXYGEN SATURATION: 97 % | HEART RATE: 88 BPM | BODY MASS INDEX: 23.17 KG/M2 | DIASTOLIC BLOOD PRESSURE: 60 MMHG | RESPIRATION RATE: 16 BRPM | WEIGHT: 135 LBS | SYSTOLIC BLOOD PRESSURE: 106 MMHG | TEMPERATURE: 98.3 F

## 2018-05-03 DIAGNOSIS — M54.50 ACUTE LEFT-SIDED LOW BACK PAIN WITHOUT SCIATICA: ICD-10-CM

## 2018-05-03 DIAGNOSIS — Z00.00 ROUTINE GENERAL MEDICAL EXAMINATION AT A HEALTH CARE FACILITY: ICD-10-CM

## 2018-05-03 DIAGNOSIS — C50.911 MALIGNANT NEOPLASM OF RIGHT FEMALE BREAST, UNSPECIFIED ESTROGEN RECEPTOR STATUS, UNSPECIFIED SITE OF BREAST (HCC): ICD-10-CM

## 2018-05-03 DIAGNOSIS — F33.41 RECURRENT MAJOR DEPRESSIVE DISORDER, IN PARTIAL REMISSION (HCC): ICD-10-CM

## 2018-05-03 DIAGNOSIS — Z00.00 PREVENTATIVE HEALTH CARE: Primary | ICD-10-CM

## 2018-05-03 DIAGNOSIS — K58.9 IRRITABLE BOWEL SYNDROME WITHOUT DIARRHEA: ICD-10-CM

## 2018-05-03 DIAGNOSIS — I10 ESSENTIAL HYPERTENSION: ICD-10-CM

## 2018-05-03 DIAGNOSIS — G47.9 DYSSOMNIA: ICD-10-CM

## 2018-05-03 DIAGNOSIS — H61.23 BILATERAL IMPACTED CERUMEN: ICD-10-CM

## 2018-05-03 PROBLEM — J06.9 VIRAL URI: Status: RESOLVED | Noted: 2017-12-12 | Resolved: 2018-05-03

## 2018-05-03 PROBLEM — J20.8 VIRAL BRONCHITIS: Status: RESOLVED | Noted: 2017-12-12 | Resolved: 2018-05-03

## 2018-05-03 LAB
BUN SERPL-MCNC: 17 MG/DL (ref 9–23)
BUN/CREAT SERPL: 24.3 (ref 7–25)
CALCIUM SERPL-MCNC: 9.5 MG/DL (ref 8.7–10.4)
CHLORIDE SERPL-SCNC: 98 MMOL/L (ref 99–109)
CO2 SERPL-SCNC: 32 MMOL/L (ref 20–31)
CREAT SERPL-MCNC: 0.7 MG/DL (ref 0.6–1.3)
GFR SERPLBLD CREATININE-BSD FMLA CKD-EPI: 102 ML/MIN/1.73
GFR SERPLBLD CREATININE-BSD FMLA CKD-EPI: 84 ML/MIN/1.73
GLUCOSE SERPL-MCNC: 84 MG/DL (ref 70–100)
POTASSIUM SERPL-SCNC: 4 MMOL/L (ref 3.5–5.5)
SODIUM SERPL-SCNC: 138 MMOL/L (ref 132–146)

## 2018-05-03 PROCEDURE — 96160 PT-FOCUSED HLTH RISK ASSMT: CPT | Performed by: INTERNAL MEDICINE

## 2018-05-03 PROCEDURE — 36415 COLL VENOUS BLD VENIPUNCTURE: CPT | Performed by: INTERNAL MEDICINE

## 2018-05-03 PROCEDURE — 90732 PPSV23 VACC 2 YRS+ SUBQ/IM: CPT | Performed by: INTERNAL MEDICINE

## 2018-05-03 PROCEDURE — G0402 INITIAL PREVENTIVE EXAM: HCPCS | Performed by: INTERNAL MEDICINE

## 2018-05-03 PROCEDURE — G0009 ADMIN PNEUMOCOCCAL VACCINE: HCPCS | Performed by: INTERNAL MEDICINE

## 2018-05-03 RX ORDER — CITALOPRAM 20 MG/1
20 TABLET ORAL DAILY
Qty: 90 TABLET | Refills: 3 | Status: SHIPPED | OUTPATIENT
Start: 2018-05-03 | End: 2018-05-10 | Stop reason: SDUPTHER

## 2018-05-03 NOTE — PATIENT INSTRUCTIONS
1.  Continue usual medicines and supplements - as listed.    2.  Back exercises each morning - for flexibility and strength.    3.  Walk daily - for physical fitness.    4.  Follow a well-balanced diet - low in salt low in sugar.    5.  Next checkup in 4 months - with preventive guidelines.

## 2018-05-05 NOTE — PROGRESS NOTES
QUICK REFERENCE INFORMATION:  The ABCs of the Annual Wellness Visit    Welcome to Medicare Visit    HEALTH RISK ASSESSMENT    1955    Recent Hospitalizations:  No hospitalization(s) within the last year..      Current Medical Providers:  Patient Care Team:  Bryon Young MD as PCP - General      Smoking Status:  History   Smoking Status   • Never Smoker   Smokeless Tobacco   • Never Used       Alcohol Consumption:  History   Alcohol Use   • 2.4 oz/week   • 4 Glasses of wine per week       Depression Screen:   No flowsheet data found.    Health Habits and Functional and Cognitive Screening:  No flowsheet data found.        Does the patient have evidence of cognitive impairment? No    Aspirin use counseling? Does not need ASA (and currently is not on it)      Recent Lab Results:  CMP:  Lab Results   Component Value Date    GLU 84 05/03/2018    BUN 17 05/03/2018    CREATININE 0.70 05/03/2018    EGFRIFNONA 84 05/03/2018    EGFRIFAFRI 102 05/03/2018    BCR 24.3 05/03/2018     05/03/2018    K 4.0 05/03/2018    CO2 32.0 (H) 05/03/2018    CALCIUM 9.5 05/03/2018    PROTENTOTREF 6.7 08/12/2016    ALBUMIN 4.70 08/14/2017    LABGLOBREF 2.6 08/12/2016    LABIL2 1.6 08/14/2017    BILITOT 0.5 08/14/2017    ALKPHOS 55 08/14/2017    AST 23 08/14/2017    ALT 16 08/14/2017     Lipid Panel:  Lab Results   Component Value Date    CHOL 192 08/14/2017    TRIG 116 08/14/2017    HDL 81 (H) 08/14/2017     HbA1c:       Visual Acuity:  No exam data present    Age-appropriate Screening Schedule:  Refer to the list below for future screening recommendations based on patient's age, sex and/or medical conditions. Orders for these recommended tests are listed in the plan section. The patient has been provided with a written plan.    Health Maintenance   Topic Date Due   • INFLUENZA VACCINE  08/01/2018   • PAP SMEAR  05/01/2019   • PNEUMOCOCCAL VACCINES (65+ LOW/MEDIUM RISK) (2 of 2 - PCV13) 05/03/2019   • COLONOSCOPY  08/21/2022   •  TDAP/TD VACCINES (2 - Td) 05/30/2023   • ZOSTER VACCINE  Completed   • MAMMOGRAM  Excluded        Subjective   History of Present Illness    Etelvina Prescott is a 63 y.o. female an established patient presenting for a Welcome to Medicare Visit.     The following portions of the patient's history were reviewed and updated as appropriate: allergies, current medications, past family history, past medical history, past social history, past surgical history and problem list.    Outpatient Medications Prior to Visit   Medication Sig Dispense Refill   • amitriptyline (ELAVIL) 10 MG tablet Take 1 tablet by mouth Every Night. 90 tablet 1   • Cholecalciferol (VITAMIN D-3) 1000 UNITS capsule Take 1 capsule by mouth.     • cycloSPORINE (RESTASIS) 0.05 % ophthalmic emulsion Apply 1 drop to eye Every 12 (Twelve) Hours.     • enalapril (VASOTEC) 20 MG tablet Take 0.5 tablets by mouth 2 (Two) Times a Day. 90 tablet 1   • fexofenadine (ALLEGRA) 180 MG tablet Take 1 tablet by mouth Daily As Needed.     • fluticasone (FLONASE) 50 MCG/ACT nasal spray 2 sprays into each nostril daily. 3 each 3   • ibuprofen (ADVIL,MOTRIN) 400 MG tablet Take 1 tablet by mouth 2 (Two) Times a Day As Needed for Mild Pain . 180 tablet 1   • Multiple Vitamin (MULTIVITAMIN) capsule Take  by mouth daily.     • Omega-3 Fatty Acids (FISH OIL) 1000 MG capsule capsule Take  by mouth daily.     • polyvinyl alcohol-povidone (REFRESH) 1.4-0.6 % ophthalmic solution Apply 1 drop to eye 2 (two) times a day.     • tamoxifen (NOLVADEX) 20 MG chemo tablet Take  by mouth daily.     • traZODone (DESYREL) 100 MG tablet TAKE ONE TABLET BY MOUTH EVERY NIGHT 90 tablet 1   • vitamin B-12 (CYANOCOBALAMIN) 2500 MCG sublingual tablet tablet Place  under the tongue daily.     • acetaminophen-codeine (TYLENOL #3) 300-30 MG per tablet Take 1 tablet by mouth 3 (Three) Times a Day As Needed for Moderate Pain . 15 tablet 0   • azithromycin (ZITHROMAX Z-CAMACHO) 250 MG tablet Take 2 tablets  the first day, then 1 tablet daily for 4 days. 6 tablet 0   • citalopram (CeleXA) 20 MG tablet Take 1 tablet by mouth Daily. 90 tablet 1   • Probiotic Product (PROBIOTIC DAILY) capsule Take  by mouth.       No facility-administered medications prior to visit.        Patient Active Problem List   Diagnosis   • Atopic rhinitis   • Impacted cerumen   • Osteoarthritis of cervical spine   • Depression   • Tear film insufficiency   • Headache   • Hypertension   • Irritable bowel syndrome   • Sarcoidosis   • Dyssomnia   • Cobalamin deficiency   • Vitamin D deficiency   • Breast cancer   • Preventative health care   • Mitral regurgitation   • Low back pain   • Left hip pain   • Chronic fatigue   • Lumbar spondylosis       Advance Care Planning:  has an advance directive - a copy HAS NOT been provided    Identification of Risk Factors:  Risk factors include: inadequate social support, caretaker stress, depression and polypharmacy.    Review of Systems    Compared to one year ago, the patient feels her physical health is the same.  Compared to one year ago, the patient feels her mental health is the same.    Objective    Physical Exam    Vitals:    05/03/18 1147   BP: 106/60   BP Location: Left arm   Patient Position: Sitting   Pulse: 88  Comment: regular   Resp: 16  Comment: normal   Temp: 98.3 °F (36.8 °C)   TempSrc: Oral   SpO2: 97%  Comment: RA   Weight: 61.2 kg (135 lb)       Patient's Body mass index is 23.17 kg/m². BMI is within normal parameters. No follow-up required.      Procedure   Procedures       Assessment/Plan   Patient Self-Management and Personalized Health Advice  The patient has been provided with information about: diet, exercise, weight management, prevention of cardiac or vascular disease, alcohol use and mental health concerns and preventive services including:   · Diabetes screening, see lab orders, Exercise counseling provided, Fall Risk assessment done, Fall Risk plan of care done, Nutrition  counseling provided.    Visit Diagnoses:    ICD-10-CM ICD-9-CM   1. Preventative health care Z00.00 V70.0   2. Essential hypertension I10 401.9   3. Irritable bowel syndrome without diarrhea K58.9 564.1   4. Acute left-sided low back pain without sciatica M54.5 724.2   5. Malignant neoplasm of right female breast, unspecified estrogen receptor status, unspecified site of breast C50.911 174.9   6. Recurrent major depressive disorder, in partial remission F33.41 296.35   7. Dyssomnia G47.9 780.56   8. Bilateral impacted cerumen H61.23 380.4       Orders Placed This Encounter   Procedures   • Pneumococcal Polysaccharide Vaccine 23-Valent (PPSV23) Greater Than or Equal To 3yo Subcutaneous / IM   • Basic Metabolic Panel   • Ambulatory Referral to ENT (Otolaryngology)     Referral Priority:   Routine     Referral Type:   Consultation     Referral Reason:   Specialty Services Required     Requested Specialty:   Otolaryngology     Number of Visits Requested:   1       Outpatient Encounter Prescriptions as of 5/3/2018   Medication Sig Dispense Refill   • amitriptyline (ELAVIL) 10 MG tablet Take 1 tablet by mouth Every Night. 90 tablet 1   • Cholecalciferol (VITAMIN D-3) 1000 UNITS capsule Take 1 capsule by mouth.     • citalopram (CeleXA) 20 MG tablet Take 1 tablet by mouth Daily. 90 tablet 3   • cycloSPORINE (RESTASIS) 0.05 % ophthalmic emulsion Apply 1 drop to eye Every 12 (Twelve) Hours.     • enalapril (VASOTEC) 20 MG tablet Take 0.5 tablets by mouth 2 (Two) Times a Day. 90 tablet 1   • fexofenadine (ALLEGRA) 180 MG tablet Take 1 tablet by mouth Daily As Needed.     • fluticasone (FLONASE) 50 MCG/ACT nasal spray 2 sprays into each nostril daily. 3 each 3   • ibuprofen (ADVIL,MOTRIN) 400 MG tablet Take 1 tablet by mouth 2 (Two) Times a Day As Needed for Mild Pain . 180 tablet 1   • Multiple Vitamin (MULTIVITAMIN) capsule Take  by mouth daily.     • Omega-3 Fatty Acids (FISH OIL) 1000 MG capsule capsule Take  by mouth  daily.     • polyvinyl alcohol-povidone (REFRESH) 1.4-0.6 % ophthalmic solution Apply 1 drop to eye 2 (two) times a day.     • tamoxifen (NOLVADEX) 20 MG chemo tablet Take  by mouth daily.     • traZODone (DESYREL) 100 MG tablet TAKE ONE TABLET BY MOUTH EVERY NIGHT 90 tablet 1   • vitamin B-12 (CYANOCOBALAMIN) 2500 MCG sublingual tablet tablet Place  under the tongue daily.     • [DISCONTINUED] acetaminophen-codeine (TYLENOL #3) 300-30 MG per tablet Take 1 tablet by mouth 3 (Three) Times a Day As Needed for Moderate Pain . 15 tablet 0   • [DISCONTINUED] azithromycin (ZITHROMAX Z-CAMACHO) 250 MG tablet Take 2 tablets the first day, then 1 tablet daily for 4 days. 6 tablet 0   • [DISCONTINUED] citalopram (CeleXA) 20 MG tablet Take 1 tablet by mouth Daily. 90 tablet 1   • [DISCONTINUED] Probiotic Product (PROBIOTIC DAILY) capsule Take  by mouth.       No facility-administered encounter medications on file as of 5/3/2018.        Reviewed use of high risk medication in the elderly: yes  Reviewed for potential of harmful drug interactions in the elderly: yes    Follow Up:  Return in about 4 months (around 9/3/2018) for Annual.     An After Visit Summary and PPPS with all of these plans were given to the patient.        The welcome to medicare exam has been reviewed in detail.  The patient has been fully counseled on preventative guidelines for vaccines, cancer screenings, and other health maintenance needs.  Functional testing has been performed to assess capacity for independent living and need for other medical interventions.   The patient was counseled on maintaining a lifestyle to promote good health and to minimize chronic diseases.  The patient has been assisted with scheduling healthcare procedures for the coming year and given a written document outlining these recommendations.    Electronically signed Bryon Young M.D.5/5/2018 4:56 PM

## 2018-05-09 ENCOUNTER — TELEPHONE (OUTPATIENT)
Dept: INTERNAL MEDICINE | Facility: CLINIC | Age: 63
End: 2018-05-09

## 2018-05-10 RX ORDER — CITALOPRAM 20 MG/1
20 TABLET ORAL DAILY
Qty: 90 TABLET | Refills: 3 | Status: SHIPPED | OUTPATIENT
Start: 2018-05-10 | End: 2019-07-08 | Stop reason: SDUPTHER

## 2018-05-10 RX ORDER — ENALAPRIL MALEATE 20 MG/1
10 TABLET ORAL 2 TIMES DAILY
Qty: 90 TABLET | Refills: 1 | Status: SHIPPED | OUTPATIENT
Start: 2018-05-10 | End: 2018-11-05 | Stop reason: SDUPTHER

## 2018-05-15 ENCOUNTER — TELEPHONE (OUTPATIENT)
Dept: INTERNAL MEDICINE | Facility: CLINIC | Age: 63
End: 2018-05-15

## 2018-05-15 NOTE — TELEPHONE ENCOUNTER
"I called pt. She said 4-5 days ago she developed a line of \"red firing spots\" on inside of her left forearm, spots on her right wrist, and thin linear strip on right side of waist. Just itchy, not painful. Sleeping well at night. She said she was bike riding at the beginning of last week and fell into some bushes. She thought she got poison ivy. Yesterday it dawned on her that she probably has developed shingles. Been applying calamine lotion and hydrocortisone crm which gives temporary relief. She wants to make sure she's doing what she's suppose to.  I explained to her that this is unlikely shingles since it's occurring on both sides of her body. I advised her on continuing calamine and hydrocortisone and starting otc zyrtec daily. May even take a benadryl at bedtime if becomes bothersome at night. If gets no better or worsens, to come for OX. Pt verb understanding.      "

## 2018-05-15 NOTE — TELEPHONE ENCOUNTER
Med Complaint:  Verónica thinks she has singles again.  Wants to know if there is something she can be given for the itching.  [address]  Wanted to speak to a nurse.

## 2018-08-16 ENCOUNTER — OFFICE VISIT (OUTPATIENT)
Dept: INTERNAL MEDICINE | Facility: CLINIC | Age: 63
End: 2018-08-16

## 2018-08-16 ENCOUNTER — LAB REQUISITION (OUTPATIENT)
Dept: LAB | Facility: HOSPITAL | Age: 63
End: 2018-08-16

## 2018-08-16 VITALS
HEART RATE: 86 BPM | OXYGEN SATURATION: 94 % | WEIGHT: 134.8 LBS | BODY MASS INDEX: 23.01 KG/M2 | TEMPERATURE: 98 F | SYSTOLIC BLOOD PRESSURE: 126 MMHG | HEIGHT: 64 IN | DIASTOLIC BLOOD PRESSURE: 72 MMHG

## 2018-08-16 DIAGNOSIS — I10 ESSENTIAL HYPERTENSION: Primary | ICD-10-CM

## 2018-08-16 DIAGNOSIS — F33.41 RECURRENT MAJOR DEPRESSIVE DISORDER, IN PARTIAL REMISSION (HCC): ICD-10-CM

## 2018-08-16 DIAGNOSIS — I34.0 NON-RHEUMATIC MITRAL REGURGITATION: ICD-10-CM

## 2018-08-16 DIAGNOSIS — M70.62 TROCHANTERIC BURSITIS OF LEFT HIP: ICD-10-CM

## 2018-08-16 DIAGNOSIS — E53.8 COBALAMIN DEFICIENCY: ICD-10-CM

## 2018-08-16 DIAGNOSIS — G47.9 DYSSOMNIA: ICD-10-CM

## 2018-08-16 DIAGNOSIS — J30.2 CHRONIC SEASONAL ALLERGIC RHINITIS, UNSPECIFIED TRIGGER: ICD-10-CM

## 2018-08-16 DIAGNOSIS — Z00.00 ROUTINE GENERAL MEDICAL EXAMINATION AT A HEALTH CARE FACILITY: ICD-10-CM

## 2018-08-16 DIAGNOSIS — E55.9 VITAMIN D DEFICIENCY: ICD-10-CM

## 2018-08-16 LAB
25(OH)D3+25(OH)D2 SERPL-MCNC: 20.3 NG/ML
ALBUMIN SERPL-MCNC: 4.58 G/DL (ref 3.2–4.8)
ALBUMIN/GLOB SERPL: 1.6 G/DL (ref 1.5–2.5)
ALP SERPL-CCNC: 51 U/L (ref 25–100)
ALT SERPL-CCNC: 14 U/L (ref 7–40)
AST SERPL-CCNC: 24 U/L (ref 0–33)
BASOPHILS # BLD AUTO: 0.04 10*3/MM3 (ref 0–0.2)
BASOPHILS NFR BLD AUTO: 0.7 % (ref 0–1)
BILIRUB SERPL-MCNC: 0.4 MG/DL (ref 0.3–1.2)
BUN SERPL-MCNC: 17 MG/DL (ref 9–23)
BUN/CREAT SERPL: 23.9 (ref 7–25)
CALCIUM SERPL-MCNC: 9.5 MG/DL (ref 8.7–10.4)
CHLORIDE SERPL-SCNC: 103 MMOL/L (ref 99–109)
CHOLEST SERPL-MCNC: 171 MG/DL (ref 0–200)
CO2 SERPL-SCNC: 29 MMOL/L (ref 20–31)
CREAT SERPL-MCNC: 0.71 MG/DL (ref 0.6–1.3)
EOSINOPHIL # BLD AUTO: 0.11 10*3/MM3 (ref 0–0.3)
EOSINOPHIL NFR BLD AUTO: 1.9 % (ref 0–3)
ERYTHROCYTE [DISTWIDTH] IN BLOOD BY AUTOMATED COUNT: 13.3 % (ref 11.3–14.5)
GLOBULIN SER CALC-MCNC: 2.9 GM/DL
GLUCOSE SERPL-MCNC: 94 MG/DL (ref 70–100)
HCT VFR BLD AUTO: 41.6 % (ref 34.5–44)
HDLC SERPL-MCNC: 74 MG/DL (ref 40–60)
HGB BLD-MCNC: 13.1 G/DL (ref 11.5–15.5)
IMM GRANULOCYTES # BLD: 0.01 10*3/MM3 (ref 0–0.03)
IMM GRANULOCYTES NFR BLD: 0.2 % (ref 0–0.6)
LDLC SERPL CALC-MCNC: 79 MG/DL (ref 0–100)
LYMPHOCYTES # BLD AUTO: 1.69 10*3/MM3 (ref 0.6–4.8)
LYMPHOCYTES NFR BLD AUTO: 29.3 % (ref 24–44)
MCH RBC QN AUTO: 30.3 PG (ref 27–31)
MCHC RBC AUTO-ENTMCNC: 31.5 G/DL (ref 32–36)
MCV RBC AUTO: 96.1 FL (ref 80–99)
MONOCYTES # BLD AUTO: 0.37 10*3/MM3 (ref 0–1)
MONOCYTES NFR BLD AUTO: 6.4 % (ref 0–12)
NEUTROPHILS # BLD AUTO: 3.55 10*3/MM3 (ref 1.5–8.3)
NEUTROPHILS NFR BLD AUTO: 61.5 % (ref 41–71)
PLATELET # BLD AUTO: 269 10*3/MM3 (ref 150–450)
POTASSIUM SERPL-SCNC: 4.5 MMOL/L (ref 3.5–5.5)
PROT SERPL-MCNC: 7.5 G/DL (ref 5.7–8.2)
RBC # BLD AUTO: 4.33 10*6/MM3 (ref 3.89–5.14)
SODIUM SERPL-SCNC: 139 MMOL/L (ref 132–146)
TRIGL SERPL-MCNC: 90 MG/DL (ref 0–150)
TSH SERPL DL<=0.005 MIU/L-ACNC: 1.27 MIU/ML (ref 0.35–5.35)
VIT B12 SERPL-MCNC: 538 PG/ML (ref 211–911)
VLDLC SERPL CALC-MCNC: 18 MG/DL
WBC # BLD AUTO: 5.77 10*3/MM3 (ref 3.5–10.8)

## 2018-08-16 PROCEDURE — 93000 ELECTROCARDIOGRAM COMPLETE: CPT | Performed by: FAMILY MEDICINE

## 2018-08-16 PROCEDURE — 99214 OFFICE O/P EST MOD 30 MIN: CPT | Performed by: FAMILY MEDICINE

## 2018-08-16 PROCEDURE — 36415 COLL VENOUS BLD VENIPUNCTURE: CPT | Performed by: FAMILY MEDICINE

## 2018-08-16 NOTE — PATIENT INSTRUCTIONS
1.  Ice the effected area over your left hip for 20 minutes every night    2.  Do the exercises and stretching provided    3.  Take the ibuprofen at night as you have been doing.  Call in 6 weeks with your progress.    4.  Follow well-balanced diet - low in salt and low in sugar.    5.  Check blood pressure every month - record readings.    6.  Blood work has been ordered for you today.  [facility].  You do not need an appointment.  If you are unable to have your labs done today, please return over the next few days to have them done.  The nurse will call you with results or they will be discussed at your next scheduled visit.    7.  Return visit November - fasting checkup.

## 2018-08-16 NOTE — PROGRESS NOTES
Subjective   Etelvina Prescott is a 63 y.o. female.     Chief Complaint   Patient presents with   • Annual Exam     Fasting       History of Present Illness     63-year-old female presents today to follow-up on various health issues.  She has a history of hypertension currently stable on enalapril 20 mg.  Her dyssomnia is modestly controlled on trazodone 100 mg in addition to Elavil 10 mg.  She uses Allegra daily for allergies in addition to Flonase.    The following portions of the patient's history were reviewed and updated as appropriate: allergies, current medications, past family history, past medical history, past social history, past surgical history and problem list.    Active Ambulatory Problems     Diagnosis Date Noted   • Atopic rhinitis 06/06/2016   • Impacted cerumen 06/06/2016   • Osteoarthritis of cervical spine 06/06/2016   • Depression 06/06/2016   • Tear film insufficiency 06/06/2016   • Headache 06/06/2016   • Hypertension 06/06/2016   • Irritable bowel syndrome 06/06/2016   • Sarcoidosis 06/06/2016   • Dyssomnia 06/06/2016   • Cobalamin deficiency 06/06/2016   • Vitamin D deficiency 06/06/2016   • Breast cancer (CMS/[facility])    • Preventative health care 06/07/2016   • Mitral regurgitation 08/12/2016   • Low back pain 08/14/2017   • Left hip pain 08/14/2017   • Chronic fatigue 08/14/2017   • Lumbar spondylosis 12/08/2017     Resolved Ambulatory Problems     Diagnosis Date Noted   • Right lower quadrant abdominal pain 06/06/2016   • Malignant neoplasm of breast (CMS/[facility]) 06/06/2016   • Dysuria 06/06/2016   • Herpes zoster 06/06/2016   • Viral URI 12/12/2017   • Viral bronchitis 12/12/2017     Past Medical History:   Diagnosis Date   • Allergic rhinitis    • Breast cancer (CMS/[facility]) 2010   • Chronic anxiety Adulthood   • Chronic constipation Adulthood   • Chronic fatigue    • Depression 1990   • Diverticulosis 2017   • Dry eye syndrome 2010   • Herpes zoster    • History of exposure to tuberculosis  Remote   • Hypertension 2006   • Impacted cerumen    • Irritable bowel syndrome    • MVP (mitral valve prolapse)    • Nephrolithiasis    • Plantar fasciitis    • Sarcoidosis    • Sleep disturbances    • Uterine bleeding      Past Surgical History:   Procedure Laterality Date   • BREAST BIOPSY Right     Benign   •  SECTION   &    • COLONOSCOPY  , 2017    Diverticulosis only.   • COLONOSCOPY W/ POLYPECTOMY  Remote   • DILATATION AND CURETTAGE  2016   • HYSTEROSCOPY ENDOMETRIAL ABLATION  2006    persistant bleeding   • MASTECTOMY Bilateral 2010    bilateral with implants, breast cancer on the right     Family History   Problem Relation Age of Onset   • Alzheimer's disease Mother          age 89   • Hypothyroidism Mother    • Osteoporosis Mother    • Pulmonary embolism Mother    • Coronary artery disease Father    • Dementia Father    • Diabetes Father    • Hypertension Father    • Heart attack Father    • Hypothyroidism Sister    • Hypertension Sister    • Breast cancer Maternal Grandmother    • Stomach cancer Paternal Aunt    • Lung cancer Paternal Aunt      Social History     Social History   • Marital status:      Spouse name: N/A   • Number of children: N/A   • Years of education: N/A     Occupational History   • Not on file.     Social History Main Topics   • Smoking status: Never Smoker   • Smokeless tobacco: Never Used   • Alcohol use 2.4 oz/week     4 Glasses of wine per week   • Drug use: No   • Sexual activity: No     Other Topics Concern   • Not on file     Social History Narrative    Domestic life   lives in private home with , who has advanced MS with paraplegia.        Spiritism    Alevism        Sleep hygiene    in bed midnight to 8 AM for 7 or 8 hours of sleep        Caffeine use    2 cups of coffee daily        Exercise habits    walks 30 minutes 4 days weekly.  Upper body strengthening 3 days weekly        Diet   well-balanced low salt  "diet        Occupation   lifelong .  Currently working part time.        Hearing : No impairment        Vision : Corrects with trifocal glasses        Driving : No limitations             Review of Systems   Constitutional: Positive for fatigue. Negative for appetite change.   HENT: Negative for ear pain and sore throat.    Eyes: Negative for itching and visual disturbance.   Respiratory: Negative for cough and shortness of breath.    Cardiovascular: Negative for chest pain and palpitations.   Gastrointestinal: Negative for abdominal pain and nausea.   Endocrine: Negative for cold intolerance and heat intolerance.   Genitourinary: Negative for dysuria and hematuria.   Musculoskeletal: Negative for arthralgias and back pain.        Left hip pain and low back pain persist after a fall down staircase on May 19.   Skin: Negative for rash and wound.   Allergic/Immunologic: Negative for environmental allergies and food allergies.   Neurological: Negative for dizziness and headaches.   Hematological: Negative for adenopathy. Does not bruise/bleed easily.   Psychiatric/Behavioral: Positive for dysphoric mood and sleep disturbance. The patient is not nervous/anxious.         Sleep impairment and depression well compensated       Objective   Blood pressure 126/72, pulse 86, temperature 98 °F (36.7 °C), temperature source Temporal Artery , height 162.6 cm (64\"), weight 61.1 kg (134 lb 12.8 oz), SpO2 94 %, not currently breastfeeding.    Physical Exam   Constitutional: She is oriented to person, place, and time. She appears well-developed and well-nourished. No distress.   HENT:   Right Ear: External ear normal.   Left Ear: External ear normal.   Nose: Nose normal.   Mouth/Throat: Oropharynx is clear and moist.   Eyes: Pupils are equal, round, and reactive to light. EOM are normal. No scleral icterus.   Neck: Normal range of motion. Neck supple. No JVD present. No thyromegaly present.   Cardiovascular: Normal " rate, regular rhythm and intact distal pulses.    Systolic murmur at apex   Pulmonary/Chest: Effort normal and breath sounds normal. She has no wheezes. She has no rales.   Abdominal: Soft. Bowel sounds are normal. She exhibits no distension and no mass. There is no tenderness.   Musculoskeletal: Normal range of motion. She exhibits no edema or tenderness.   Moderate bunion left foot   Lymphadenopathy:     She has no cervical adenopathy.   Neurological: She is alert and oriented to person, place, and time.   Vibratory normal  Romberg negative  Gait normal  Plantars downgoing     Skin: Skin is warm and dry. No rash noted.   Bilateral breast implants without nodularity - status post bilateral mastectomies   Psychiatric: She has a normal mood and affect. Her behavior is normal. Judgment and thought content normal.   Nursing note and vitals reviewed.      ECG 12 Lead  Date/Time: 8/16/2018 10:01 AM  Performed by: MARY ANNE CHENG  Authorized by: MARY ANNE CHENG   Interpreted by ED physician  Comparison: compared with previous ECG from 8/11/2017  Similar to previous ECG  Rhythm: sinus rhythm  Rate: normal  Conduction: conduction normal  ST Segments: ST segments normal  T Waves: T waves normal  QRS axis: normal  Other: no other findings  Clinical impression: normal ECG          Assessment/Plan   Etelvina was seen today for annual exam.    Diagnoses and all orders for this visit:    Essential hypertension  -     Lipid panel; Future  -     TSH; Future  -     Comprehensive metabolic panel; Future  -     Lipid panel  -     TSH  -     Comprehensive metabolic panel    Non-rheumatic mitral regurgitation  -     CBC w AUTO Differential; Future  -     CBC w AUTO Differential    Chronic seasonal allergic rhinitis, unspecified trigger  -     CBC w AUTO Differential; Future  -     CBC w AUTO Differential    Cobalamin deficiency  -     Vitamin B12; Future  -     Vitamin B12    Vitamin D deficiency  -     Vitamin D 25 hydroxy;  Future  -     Vitamin D 25 hydroxy    Recurrent major depressive disorder, in partial remission (CMS/HCC)    Dyssomnia  -     TSH; Future  -     TSH    Trochanteric bursitis of left hip      Patient has had a persistent and worsening left lateral hip pain ever since a fall last year.  This appears to be associated with trochanteric bursitis, she has been provided with exercises and stretching, should ice the area additionally.  Would consider a cortisone injection of the bursa in the future if not improved in 6 weeks.    The patient has chronic depression and sleep disturbance over 25 years.  She has done better this summer.  I've asked her to lower her amitriptyline dose again and eventually rely on trazodone alone for sleep.  I've asked her to continue on citalopram on a permanent basis.    Patient's 8 years status post left breast cancer.  She's been asked to stay on tamoxifen for 10 years by her oncologist.  She seems to be coping well with the antiestrogen properties.    The patient has many years of essential hypertension.  She is doing well on enalapril alone.  She has had reaction to sulfa antibiotics and may require a calcium blocker for further management if needed.    The patient has moderate allergic rhinitis with congestion.  She is doing well on Allegra and Flonase.  I've asked her to exhibit environmental control.    Will have hysterectomy for atypical cells, sees specialist at end of month. Also sees Dr. Harrison.    Patient Instructions   1.  Ice the effected area over your left hip for 20 minutes every night    2.  Do the exercises and stretching provided    3.  Take the ibuprofen at night as you have been doing.  Call in 6 weeks with your progress.    4.  Follow well-balanced diet - low in salt and low in sugar.    5.  Check blood pressure every month - record readings.    6.  Blood work has been ordered for you today.  [facility].  You do not need an  appointment.  If you are unable to have your labs done today, please return over the next few days to have them done.  The nurse will call you with results or they will be discussed at your next scheduled visit.    7.  Return visit November - fasting checkup.        Current Outpatient Prescriptions:   •  amitriptyline (ELAVIL) 10 MG tablet, Take 1 tablet by mouth Every Night., Disp: 90 tablet, Rfl: 1  •  betamethasone valerate (VALISONE) 0.1 % ointment, , Disp: , Rfl:   •  citalopram (CeleXA) 20 MG tablet, Take 1 tablet by mouth Daily., Disp: 90 tablet, Rfl: 3  •  cycloSPORINE (RESTASIS) 0.05 % ophthalmic emulsion, Apply 1 drop to eye Every 12 (Twelve) Hours., Disp: , Rfl:   •  enalapril (VASOTEC) 20 MG tablet, Take 0.5 tablets by mouth 2 (Two) Times a Day., Disp: 90 tablet, Rfl: 1  •  fexofenadine (ALLEGRA) 180 MG tablet, Take 1 tablet by mouth Daily As Needed., Disp: , Rfl:   •  fluticasone (FLONASE) 50 MCG/ACT nasal spray, 2 sprays into each nostril daily., Disp: 3 each, Rfl: 3  •  ibuprofen (ADVIL,MOTRIN) 400 MG tablet, Take 1 tablet by mouth 2 (Two) Times a Day As Needed for Mild Pain ., Disp: 180 tablet, Rfl: 1  •  Multiple Vitamin (MULTIVITAMIN) capsule, Take  by mouth daily., Disp: , Rfl:   •  polyvinyl alcohol-povidone (REFRESH) 1.4-0.6 % ophthalmic solution, Apply 1 drop to eye 2 (two) times a day., Disp: , Rfl:   •  tamoxifen (NOLVADEX) 20 MG chemo tablet, Take  by mouth daily., Disp: , Rfl:   •  traZODone (DESYREL) 100 MG tablet, TAKE ONE TABLET BY MOUTH EVERY NIGHT, Disp: 90 tablet, Rfl: 1  •  Cholecalciferol (VITAMIN D-3) 1000 UNITS capsule, Take 1 capsule by mouth., Disp: , Rfl:   •  Omega-3 Fatty Acids (FISH OIL) 1000 MG capsule capsule, Take  by mouth daily., Disp: , Rfl:   •  vitamin B-12 (CYANOCOBALAMIN) 2500 MCG sublingual tablet tablet, Place  under the tongue daily., Disp: , Rfl:     Allergies   Allergen Reactions   • Penicillins Rash   • Sulfa Antibiotics Rash       Immunization History    Administered Date(s) Administered   • Flu Vaccine High Dose PF 65YR+ 12/08/2017   • Influenza, Quadrivalent 11/14/2013, 10/01/2016   • PPD Test 01/01/2011, 08/03/2015, 06/07/2016, 06/07/2016   • Pneumococcal Polysaccharide (PPSV23) 05/03/2018   • Td 07/29/2004   • Tdap 05/30/2013   • Zostavax 02/10/2014

## 2018-08-29 ENCOUNTER — OFFICE VISIT (OUTPATIENT)
Dept: GYNECOLOGIC ONCOLOGY | Facility: CLINIC | Age: 63
End: 2018-08-29

## 2018-08-29 ENCOUNTER — APPOINTMENT (OUTPATIENT)
Dept: LAB | Facility: HOSPITAL | Age: 63
End: 2018-08-29

## 2018-08-29 VITALS
OXYGEN SATURATION: 97 % | WEIGHT: 136 LBS | HEIGHT: 64 IN | SYSTOLIC BLOOD PRESSURE: 136 MMHG | HEART RATE: 87 BPM | RESPIRATION RATE: 16 BRPM | TEMPERATURE: 97.9 F | BODY MASS INDEX: 23.22 KG/M2 | DIASTOLIC BLOOD PRESSURE: 62 MMHG

## 2018-08-29 DIAGNOSIS — Z79.810 USE OF TAMOXIFEN (NOLVADEX): ICD-10-CM

## 2018-08-29 DIAGNOSIS — C50.911 MALIGNANT NEOPLASM OF RIGHT FEMALE BREAST, UNSPECIFIED ESTROGEN RECEPTOR STATUS, UNSPECIFIED SITE OF BREAST (HCC): ICD-10-CM

## 2018-08-29 DIAGNOSIS — N85.00 ENDOMETRIAL HYPERPLASIA: ICD-10-CM

## 2018-08-29 DIAGNOSIS — R87.618 UNEXPLAINED ENDOMETRIAL CELLS ON CERVICAL PAP SMEAR: Primary | ICD-10-CM

## 2018-08-29 PROBLEM — R87.619 ATYPICAL GLANDULAR CELLS OF UNDETERMINED SIGNIFICANCE (AGUS) ON CERVICAL PAP SMEAR: Status: ACTIVE | Noted: 2018-08-29

## 2018-08-29 PROCEDURE — 58100 BIOPSY OF UTERUS LINING: CPT | Performed by: OBSTETRICS & GYNECOLOGY

## 2018-08-29 PROCEDURE — 99204 OFFICE O/P NEW MOD 45 MIN: CPT | Performed by: OBSTETRICS & GYNECOLOGY

## 2018-08-29 PROCEDURE — 88305 TISSUE EXAM BY PATHOLOGIST: CPT

## 2018-09-01 PROBLEM — Z79.810 USE OF TAMOXIFEN (NOLVADEX): Status: ACTIVE | Noted: 2018-09-01

## 2018-09-01 PROBLEM — N85.00 ENDOMETRIAL HYPERPLASIA: Status: ACTIVE | Noted: 2018-09-01

## 2018-09-04 ENCOUNTER — CLINICAL SUPPORT (OUTPATIENT)
Dept: FAMILY MEDICINE CLINIC | Facility: CLINIC | Age: 63
End: 2018-09-04

## 2018-09-04 ENCOUNTER — TELEPHONE (OUTPATIENT)
Dept: GYNECOLOGIC ONCOLOGY | Facility: CLINIC | Age: 63
End: 2018-09-04

## 2018-09-04 DIAGNOSIS — Z11.1 PPD SCREENING TEST: Primary | ICD-10-CM

## 2018-09-04 NOTE — TELEPHONE ENCOUNTER
----- Message from Janey Villafuerte MD sent at 9/1/2018  2:41 AM EDT -----  Insufficient tissue for definitive diagnosis, but no cancer or pre cancer seen.  Per pathology comments, need additional sampling.   Pls schedule D&C, hysteroscopy.  Thanks-    ----- Message -----  From: Lab, Background User  Sent: 8/31/2018   6:09 PM  To: Janey Villafuerte MD

## 2018-09-05 ENCOUNTER — PATIENT EDUCATION (SURGERY INSTRUCTIONS) (OUTPATIENT)
Dept: GYNECOLOGIC ONCOLOGY | Facility: CLINIC | Age: 63
End: 2018-09-05

## 2018-09-05 NOTE — PATIENT INSTRUCTIONS
Outpatient Pre-op Patient Education  *See checked boxes for your instructions*    Etelvina Prescott  [id number]  1955    SURGEON: Dr. Villafuerte    Appointment  [x]  1. Your surgery has been scheduled on 9-24-18 at [facility] located at [address]. You will need to be there at 11:00 AM.   [x] 2.  You have pre-admission testing (PAT) appointment on 9-23-18 at 1:15 PM.  You will need to be at hospital registration 10 minutes before that time.     [x] 3.  The registration department is located in the long hallway between the [address].       The Day(s) Before Surgery  [x] 1.  Do not drink alcohol or smoke.     [x] 2.  Do not take vitamins or aspirin one week before surgery.       [x] 3.  If you are ill on the days leading up to your surgery, please call our office.      [x] 4.  If you are using medications for diabetes, call the physician who manages these and get instructions on how they should be taken before and after surgery.    [x] 5.  Nothing to eat or drink after midnight on 9-23-18.      [x] 6.  Please make prior arrangements for someone to drive you home after your procedure        The Day of Surgery  [x] 1.  Do not eat, drink, or chew gum.     [x] 2.  On the morning of your surgery, you may take her prescription medications with a sip of water. Bring all medication with you to the surgery center. (Diabetic patients should bring insulin if instructed to do so by their diabetes managing physician).   [x] 3. Bathe or shower the morning of your surgery. Do not use powders, lotions, or creams. Deodorants are okay.     [x] 4. Wear loose, comfortable clothing.     [x] 5. Bring holders for glasses, contacts, or dentures.      [x] 6. Bring any required payment and forms, including insurance cards. Leave all money and valuables at home.        Post-surgery Instructions  [x] 1.  For the first 24 hours, rest and take periodic deep breaths to remove anesthetic agents  from your body.   [x] 2.  Follow any specific instructions relevant to your particular surgery.     [x] 3.  Limit activity to avoid stress to the surgical site.      [x] 4.  Keep all dressings dry. Shower or bathe as instructed by your doctor.     [x] 5.  Drink and eat light foods. Remain on liquids alone only if nausea and vomiting occur. Return to your regular diet gradually, as tolerance allows.    [x] 6. Avoid alcohol for at least 24 hours.      [x] 7.  Take prescription pain medication as directed and with food.      [x] 8.  Call our office after discharge from the surgery center to make your post-op appointment.        In Case of Emergency:  Call our office if you experience any of the following:  - Excessive drainage, bleeding, swelling, or redness at the incision site  - Severe pain not eased by pain medication  - Temperature above 101  - Persistent nausea or vomiting  - Skin rash or general body itching

## 2018-09-06 ENCOUNTER — PREP FOR SURGERY (OUTPATIENT)
Dept: GYNECOLOGIC ONCOLOGY | Facility: CLINIC | Age: 63
End: 2018-09-06

## 2018-09-06 ENCOUNTER — CLINICAL SUPPORT (OUTPATIENT)
Dept: FAMILY MEDICINE CLINIC | Facility: CLINIC | Age: 63
End: 2018-09-06

## 2018-09-06 DIAGNOSIS — Z79.810 USE OF TAMOXIFEN (NOLVADEX): ICD-10-CM

## 2018-09-06 DIAGNOSIS — R87.619 ATYPICAL GLANDULAR CELLS OF UNDETERMINED SIGNIFICANCE (AGUS) ON CERVICAL PAP SMEAR: Primary | ICD-10-CM

## 2018-09-06 DIAGNOSIS — Z11.1 PPD SCREENING TEST: Primary | ICD-10-CM

## 2018-09-06 LAB
INDURATION: 0 MM (ref 0–10)
TB SKIN TEST: NEGATIVE

## 2018-09-06 PROCEDURE — 86580 TB INTRADERMAL TEST: CPT | Performed by: FAMILY MEDICINE

## 2018-09-11 ENCOUNTER — RESULTS ENCOUNTER (OUTPATIENT)
Dept: GYNECOLOGIC ONCOLOGY | Facility: CLINIC | Age: 63
End: 2018-09-11

## 2018-09-11 DIAGNOSIS — Z79.810 USE OF TAMOXIFEN (NOLVADEX): ICD-10-CM

## 2018-09-11 DIAGNOSIS — R87.619 ATYPICAL GLANDULAR CELLS OF UNDETERMINED SIGNIFICANCE (AGUS) ON CERVICAL PAP SMEAR: ICD-10-CM

## 2018-09-18 ENCOUNTER — TELEPHONE (OUTPATIENT)
Dept: GYNECOLOGIC ONCOLOGY | Facility: CLINIC | Age: 63
End: 2018-09-18

## 2018-09-18 NOTE — TELEPHONE ENCOUNTER
Orders faxed successfully to [facility] for pt's upcoming outpt procedure 9-24-18 with Dr. Villafuerte.

## 2018-09-21 ENCOUNTER — TELEPHONE (OUTPATIENT)
Dept: GYNECOLOGIC ONCOLOGY | Facility: CLINIC | Age: 63
End: 2018-09-21

## 2018-09-21 NOTE — TELEPHONE ENCOUNTER
Phoned pt, instructed her to be at [facility] at 0700 instead of 1100, pt v/u, will do as instructed.

## 2018-09-23 ENCOUNTER — APPOINTMENT (OUTPATIENT)
Dept: PREADMISSION TESTING | Facility: HOSPITAL | Age: 63
End: 2018-09-23

## 2018-09-23 ENCOUNTER — HOSPITAL ENCOUNTER (OUTPATIENT)
Dept: GENERAL RADIOLOGY | Facility: HOSPITAL | Age: 63
Discharge: HOME | End: 2018-09-23
Admitting: OBSTETRICS & GYNECOLOGY

## 2018-09-23 DIAGNOSIS — Z79.810 USE OF TAMOXIFEN (NOLVADEX): ICD-10-CM

## 2018-09-23 DIAGNOSIS — R87.619 ATYPICAL GLANDULAR CELLS OF UNDETERMINED SIGNIFICANCE (AGUS) ON CERVICAL PAP SMEAR: ICD-10-CM

## 2018-09-23 LAB
ALBUMIN SERPL-MCNC: 4.46 G/DL (ref 3.2–4.8)
ALBUMIN/GLOB SERPL: 1.8 G/DL (ref 1.5–2.5)
ALP SERPL-CCNC: 50 U/L (ref 25–100)
ALT SERPL W P-5'-P-CCNC: 18 U/L (ref 7–40)
ANION GAP SERPL CALCULATED.3IONS-SCNC: 2 MMOL/L (ref 3–11)
AST SERPL-CCNC: 24 U/L (ref 0–33)
BASOPHILS # BLD AUTO: 0.04 10*3/MM3 (ref 0–0.2)
BASOPHILS NFR BLD AUTO: 0.7 % (ref 0–1)
BILIRUB SERPL-MCNC: 0.5 MG/DL (ref 0.3–1.2)
BUN BLD-MCNC: 15 MG/DL (ref 9–23)
BUN/CREAT SERPL: 21.1 (ref 7–25)
CALCIUM SPEC-SCNC: 9.4 MG/DL (ref 8.7–10.4)
CHLORIDE SERPL-SCNC: 105 MMOL/L (ref 99–109)
CO2 SERPL-SCNC: 32 MMOL/L (ref 20–31)
CREAT BLD-MCNC: 0.71 MG/DL (ref 0.6–1.3)
DEPRECATED RDW RBC AUTO: 45.5 FL (ref 37–54)
EOSINOPHIL # BLD AUTO: 0.05 10*3/MM3 (ref 0–0.3)
EOSINOPHIL NFR BLD AUTO: 0.9 % (ref 0–3)
ERYTHROCYTE [DISTWIDTH] IN BLOOD BY AUTOMATED COUNT: 13.3 % (ref 11.3–14.5)
GFR SERPL CREATININE-BSD FRML MDRD: 83 ML/MIN/1.73
GLOBULIN UR ELPH-MCNC: 2.4 GM/DL
GLUCOSE BLD-MCNC: 129 MG/DL (ref 70–100)
HCT VFR BLD AUTO: 38.2 % (ref 34.5–44)
HGB BLD-MCNC: 12.2 G/DL (ref 11.5–15.5)
IMM GRANULOCYTES # BLD: 0 10*3/MM3 (ref 0–0.03)
IMM GRANULOCYTES NFR BLD: 0 % (ref 0–0.6)
LYMPHOCYTES # BLD AUTO: 1.81 10*3/MM3 (ref 0.6–4.8)
LYMPHOCYTES NFR BLD AUTO: 31.3 % (ref 24–44)
MCH RBC QN AUTO: 29.8 PG (ref 27–31)
MCHC RBC AUTO-ENTMCNC: 31.9 G/DL (ref 32–36)
MCV RBC AUTO: 93.2 FL (ref 80–99)
MONOCYTES # BLD AUTO: 0.41 10*3/MM3 (ref 0–1)
MONOCYTES NFR BLD AUTO: 7.1 % (ref 0–12)
NEUTROPHILS # BLD AUTO: 3.48 10*3/MM3 (ref 1.5–8.3)
NEUTROPHILS NFR BLD AUTO: 60 % (ref 41–71)
PLATELET # BLD AUTO: 262 10*3/MM3 (ref 150–450)
PMV BLD AUTO: 10 FL (ref 6–12)
POTASSIUM BLD-SCNC: 4.3 MMOL/L (ref 3.5–5.5)
PROT SERPL-MCNC: 6.9 G/DL (ref 5.7–8.2)
RBC # BLD AUTO: 4.1 10*6/MM3 (ref 3.89–5.14)
SODIUM BLD-SCNC: 139 MMOL/L (ref 132–146)
WBC NRBC COR # BLD: 5.79 10*3/MM3 (ref 3.5–10.8)

## 2018-09-23 PROCEDURE — 85025 COMPLETE CBC W/AUTO DIFF WBC: CPT | Performed by: OBSTETRICS & GYNECOLOGY

## 2018-09-23 PROCEDURE — 71046 X-RAY EXAM CHEST 2 VIEWS: CPT

## 2018-09-23 PROCEDURE — 80053 COMPREHEN METABOLIC PANEL: CPT | Performed by: OBSTETRICS & GYNECOLOGY

## 2018-09-23 PROCEDURE — 36415 COLL VENOUS BLD VENIPUNCTURE: CPT

## 2018-09-24 ENCOUNTER — OUTSIDE FACILITY SERVICE (OUTPATIENT)
Dept: GYNECOLOGIC ONCOLOGY | Facility: CLINIC | Age: 63
End: 2018-09-24

## 2018-09-24 ENCOUNTER — LAB REQUISITION (OUTPATIENT)
Dept: LAB | Facility: HOSPITAL | Age: 63
End: 2018-09-24

## 2018-09-24 DIAGNOSIS — R87.619 ABNORMAL CYTOLOGICAL FINDING IN SPECIMEN FROM CERVIX UTERI: ICD-10-CM

## 2018-09-24 PROCEDURE — 88305 TISSUE EXAM BY PATHOLOGIST: CPT | Performed by: OBSTETRICS & GYNECOLOGY

## 2018-09-26 LAB
CYTO UR: NORMAL
LAB AP CASE REPORT: NORMAL
LAB AP CLINICAL INFORMATION: NORMAL
LAB AP DIAGNOSIS COMMENT: NORMAL
PATH REPORT.FINAL DX SPEC: NORMAL
PATH REPORT.GROSS SPEC: NORMAL

## 2018-10-01 ENCOUNTER — TELEPHONE (OUTPATIENT)
Dept: GYNECOLOGIC ONCOLOGY | Facility: CLINIC | Age: 63
End: 2018-10-01

## 2018-10-01 NOTE — TELEPHONE ENCOUNTER
Final Diagnosis   ENDOMETRIAL CURETTAGE:  Scant sample of primarily detached squamous mucosa with limited detached benign appearing glandular epithelial cells with no true endometrial stroma or endometrial tissue fragments present (see Comment).     I called patient to discuss the pathology as noted above.  Although there is no true endometrial tissue identified, on hysteroscopy I was in the endometrial cavity which was markedly atrophic.  Sound was only to a few centimeters and there is near perforation of the uterus just with sounding the uterus.    I recommended observation.  She due for annual examination 5 2019 recommended she keep this.  This was communicated to .

## 2018-10-29 ENCOUNTER — TELEPHONE (OUTPATIENT)
Dept: FAMILY MEDICINE CLINIC | Facility: CLINIC | Age: 63
End: 2018-10-29

## 2018-10-29 RX ORDER — AMITRIPTYLINE HYDROCHLORIDE 10 MG/1
10 TABLET, FILM COATED ORAL NIGHTLY
Qty: 90 TABLET | Refills: 1 | Status: SHIPPED | OUTPATIENT
Start: 2018-10-29 | End: 2019-04-29 | Stop reason: SDUPTHER

## 2018-10-29 NOTE — TELEPHONE ENCOUNTER
AMITRITYLINE 10 MG 1 TABLET EVERY NIGHT 90 DAY SUPPLY    [facility]  PLUS

## 2018-11-05 RX ORDER — ENALAPRIL MALEATE 20 MG/1
TABLET ORAL
Qty: 90 TABLET | Refills: 1 | Status: SHIPPED | OUTPATIENT
Start: 2018-11-05 | End: 2019-05-07 | Stop reason: SDUPTHER

## 2018-11-07 ENCOUNTER — TELEPHONE (OUTPATIENT)
Dept: FAMILY MEDICINE CLINIC | Facility: CLINIC | Age: 63
End: 2018-11-07

## 2018-11-07 RX ORDER — TRAZODONE HYDROCHLORIDE 100 MG/1
100 TABLET ORAL
Qty: 90 TABLET | Refills: 1 | Status: SHIPPED | OUTPATIENT
Start: 2018-11-07 | End: 2018-11-13 | Stop reason: SDUPTHER

## 2018-11-13 RX ORDER — TRAZODONE HYDROCHLORIDE 100 MG/1
100 TABLET ORAL
Qty: 90 TABLET | Refills: 1 | Status: SHIPPED | OUTPATIENT
Start: 2018-11-13 | End: 2020-01-21 | Stop reason: SDUPTHER

## 2018-11-13 NOTE — TELEPHONE ENCOUNTER
[facility] MAIL DELIVERY CALLED AND SAID THE TRAZODONE 100 MG WAS SUPPOSED TO BE SENT TO THEM, NOT [facility]    THEY WOULD LIKE IT SENT TO THEM OR THEY CAN TAKE A VERBAL

## 2018-11-26 ENCOUNTER — OFFICE VISIT (OUTPATIENT)
Dept: FAMILY MEDICINE CLINIC | Facility: CLINIC | Age: 63
End: 2018-11-26

## 2018-11-26 VITALS
HEART RATE: 88 BPM | SYSTOLIC BLOOD PRESSURE: 118 MMHG | BODY MASS INDEX: 22.88 KG/M2 | DIASTOLIC BLOOD PRESSURE: 74 MMHG | WEIGHT: 134 LBS | OXYGEN SATURATION: 96 % | HEIGHT: 64 IN

## 2018-11-26 DIAGNOSIS — M70.62 GREATER TROCHANTERIC BURSITIS OF LEFT HIP: ICD-10-CM

## 2018-11-26 DIAGNOSIS — I10 ESSENTIAL HYPERTENSION: Primary | ICD-10-CM

## 2018-11-26 DIAGNOSIS — E55.9 VITAMIN D DEFICIENCY: ICD-10-CM

## 2018-11-26 DIAGNOSIS — G47.9 DYSSOMNIA: ICD-10-CM

## 2018-11-26 PROCEDURE — 20610 DRAIN/INJ JOINT/BURSA W/O US: CPT | Performed by: FAMILY MEDICINE

## 2018-11-26 PROCEDURE — 99213 OFFICE O/P EST LOW 20 MIN: CPT | Performed by: FAMILY MEDICINE

## 2018-11-26 RX ORDER — TRIAMCINOLONE ACETONIDE 40 MG/ML
40 INJECTION, SUSPENSION INTRA-ARTICULAR; INTRAMUSCULAR ONCE
Status: COMPLETED | OUTPATIENT
Start: 2018-11-26 | End: 2018-11-26

## 2018-11-26 RX ADMIN — TRIAMCINOLONE ACETONIDE 40 MG: 40 INJECTION, SUSPENSION INTRA-ARTICULAR; INTRAMUSCULAR at 11:55

## 2019-01-21 ENCOUNTER — OFFICE VISIT (OUTPATIENT)
Dept: FAMILY MEDICINE CLINIC | Facility: CLINIC | Age: 64
End: 2019-01-21

## 2019-01-21 VITALS
SYSTOLIC BLOOD PRESSURE: 120 MMHG | HEIGHT: 64 IN | OXYGEN SATURATION: 98 % | DIASTOLIC BLOOD PRESSURE: 62 MMHG | HEART RATE: 91 BPM | WEIGHT: 132 LBS | BODY MASS INDEX: 22.53 KG/M2 | RESPIRATION RATE: 20 BRPM

## 2019-01-21 DIAGNOSIS — M70.62 GREATER TROCHANTERIC BURSITIS OF LEFT HIP: Primary | ICD-10-CM

## 2019-01-21 DIAGNOSIS — M76.02 GLUTEAL TENDINITIS OF LEFT BUTTOCK: ICD-10-CM

## 2019-01-21 PROCEDURE — 99214 OFFICE O/P EST MOD 30 MIN: CPT | Performed by: FAMILY MEDICINE

## 2019-02-09 PROBLEM — J06.9 VIRAL URI WITH COUGH: Status: ACTIVE | Noted: 2019-02-09

## 2019-04-29 ENCOUNTER — TELEPHONE (OUTPATIENT)
Dept: FAMILY MEDICINE CLINIC | Facility: CLINIC | Age: 64
End: 2019-04-29

## 2019-04-29 DIAGNOSIS — E55.9 VITAMIN D DEFICIENCY: ICD-10-CM

## 2019-04-29 DIAGNOSIS — I10 ESSENTIAL HYPERTENSION: ICD-10-CM

## 2019-04-29 DIAGNOSIS — Z00.00 MEDICARE ANNUAL WELLNESS VISIT, SUBSEQUENT: Primary | ICD-10-CM

## 2019-04-29 NOTE — TELEPHONE ENCOUNTER
Patient is having pre-op blood work done and wants to know if Dr Bojorquez can put in orders for her to get her yearly blood work as well so they can get it all done at one time. Please call at [phone]

## 2019-04-30 RX ORDER — AMITRIPTYLINE HYDROCHLORIDE 10 MG/1
TABLET, FILM COATED ORAL
Qty: 90 TABLET | Refills: 1 | OUTPATIENT
Start: 2019-04-30 | End: 2019-07-25

## 2019-04-30 NOTE — TELEPHONE ENCOUNTER
Spoke with patients , BRAXTON reviewed. Explained that the lab orders were placed and that she should come in fasting. The  stated she will be coming tomorrow for labs and will give her the message.

## 2019-05-01 ENCOUNTER — LAB REQUISITION (OUTPATIENT)
Dept: LAB | Facility: HOSPITAL | Age: 64
End: 2019-05-01

## 2019-05-01 DIAGNOSIS — Z00.00 ROUTINE GENERAL MEDICAL EXAMINATION AT A HEALTH CARE FACILITY: ICD-10-CM

## 2019-05-01 PROCEDURE — 36415 COLL VENOUS BLD VENIPUNCTURE: CPT | Performed by: FAMILY MEDICINE

## 2019-05-07 RX ORDER — ENALAPRIL MALEATE 20 MG/1
TABLET ORAL
Qty: 90 TABLET | Refills: 1 | Status: SHIPPED | OUTPATIENT
Start: 2019-05-07 | End: 2019-11-18 | Stop reason: SDUPTHER

## 2019-06-03 ENCOUNTER — OFFICE VISIT (OUTPATIENT)
Dept: FAMILY MEDICINE CLINIC | Facility: CLINIC | Age: 64
End: 2019-06-03

## 2019-06-03 VITALS
HEART RATE: 74 BPM | WEIGHT: 133 LBS | DIASTOLIC BLOOD PRESSURE: 70 MMHG | SYSTOLIC BLOOD PRESSURE: 120 MMHG | OXYGEN SATURATION: 97 % | HEIGHT: 64 IN | BODY MASS INDEX: 22.71 KG/M2

## 2019-06-03 DIAGNOSIS — Z00.00 MEDICARE ANNUAL WELLNESS VISIT, INITIAL: Primary | ICD-10-CM

## 2019-06-03 DIAGNOSIS — I10 ESSENTIAL HYPERTENSION: ICD-10-CM

## 2019-06-03 PROCEDURE — 96160 PT-FOCUSED HLTH RISK ASSMT: CPT | Performed by: FAMILY MEDICINE

## 2019-06-03 PROCEDURE — G0438 PPPS, INITIAL VISIT: HCPCS | Performed by: FAMILY MEDICINE

## 2019-06-03 NOTE — PROGRESS NOTES
QUICK REFERENCE INFORMATION:  The ABCs of the Annual Wellness Visit    Initial Medicare Wellness Visit    HEALTH RISK ASSESSMENT    : 1955    Recent Hospitalizations:  Recently treated at the following:  Other: [facility]- breast implant collapsed, surgery 4 weeks ago.  Doing well postoperatively      Current Medical Providers:  Patient Care Team:  Marcio Bojorquez DO as PCP - General (Family Medicine)        Smoking Status:  Social History     Tobacco Use   Smoking Status Never Smoker   Smokeless Tobacco Never Used       Alcohol Consumption:  Social History     Substance and Sexual Activity   Alcohol Use Yes   • Alcohol/week: 2.4 oz   • Types: 4 Glasses of wine per week       Depression Screen:   PHQ-2/PHQ-9 Depression Screening 6/3/2019   Little interest or pleasure in doing things 0   Feeling down, depressed, or hopeless 0   Total Score 0       Health Habits and Functional and Cognitive Screening:  Functional & Cognitive Status 6/3/2019   Do you have difficulty preparing food and eating? No   Do you have difficulty bathing yourself, getting dressed or grooming yourself? No   Do you have difficulty using the toilet? No   Do you have difficulty moving around from place to place? No   Do you have trouble with steps or getting out of a bed or a chair? No   In the past year have you fallen or experienced a near fall? Yes   Current Diet Well Balanced Diet   Dental Exam Up to date   Eye Exam Up to date   Exercise (times per week) 2 times per week   Current Exercise Activities Include Walking   Do you need help using the phone?  No   Are you deaf or do you have serious difficulty hearing?  No   Do you need help with transportation? No   Do you need help shopping? No   Do you need help preparing meals?  No   Do you need help with housework?  No   Do you need help with laundry? No   Do you need help taking your medications? No   Do you need help managing money? No   Do you ever drive or ride in a car  without wearing a seat belt? No   Have you felt unusual stress, anger or loneliness in the last month? No   Who do you live with? Spouse   If you need help, do you have trouble finding someone available to you? No   Have you been bothered in the last four weeks by sexual problems? No   Do you have difficulty concentrating, remembering or making decisions? Yes           Does the patient have evidence of cognitive impairment? No    Asiprin use counseling: Taking ASA appropriately as indicated      Recent Lab Results:    Lab Results   Component Value Date    GLU 94 08/16/2018        Lab Results   Component Value Date    CHOL 192 08/14/2017    TRIG 90 08/16/2018    HDL 74 (H) 08/16/2018    VLDL 18 08/16/2018           Age-appropriate Screening Schedule:  Refer to the list below for future screening recommendations based on patient's age, sex and/or medical conditions. Orders for these recommended tests are listed in the plan section. The patient has been provided with a written plan.    Health Maintenance   Topic Date Due   • ZOSTER VACCINE (2 of 3) 04/07/2014   • PNEUMOCOCCAL VACCINES (65+ LOW/MEDIUM RISK) (2 of 2 - PCV13) 05/03/2019   • INFLUENZA VACCINE  08/01/2019   • COLONOSCOPY  08/21/2022   • TDAP/TD VACCINES (2 - Td) 05/30/2023   • MAMMOGRAM  Discontinued        Subjective   History of Present Illness    Etelvina Prescott is a 64 y.o. female who presents for an Annual Wellness Visit.    The following portions of the patient's history were reviewed and updated as appropriate: allergies, current medications, past family history, past medical history, past social history, past surgical history and problem list.    Outpatient Medications Prior to Visit   Medication Sig Dispense Refill   • amitriptyline (ELAVIL) 10 MG tablet TAKE 1 TABLET EVERY NIGHT 90 tablet 1   • betamethasone valerate (VALISONE) 0.1 % ointment      • citalopram (CeleXA) 20 MG tablet Take 1 tablet by mouth Daily. 90 tablet 3   • cycloSPORINE  (RESTASIS) 0.05 % ophthalmic emulsion Apply 1 drop to eye Every 12 (Twelve) Hours.     • diclofenac (VOLTAREN) 1 % gel gel Apply 4 g topically to the appropriate area as directed 4 (Four) Times a Day As Needed (hip pain). 100 g 1   • enalapril (VASOTEC) 20 MG tablet TAKE 1/2 TABLET TWICE DAILY 90 tablet 1   • fexofenadine (ALLEGRA) 180 MG tablet Take 1 tablet by mouth Daily As Needed.     • fluticasone (FLONASE) 50 MCG/ACT nasal spray 2 sprays into each nostril daily. 3 each 3   • Multiple Vitamin (MULTIVITAMIN) capsule Take  by mouth daily.     • Omega-3 Fatty Acids (FISH OIL) 1000 MG capsule capsule Take  by mouth daily.     • polyvinyl alcohol-povidone (REFRESH) 1.4-0.6 % ophthalmic solution Apply 1 drop to eye 2 (two) times a day.     • tamoxifen (NOLVADEX) 20 MG chemo tablet Take  by mouth daily.     • traZODone (DESYREL) 100 MG tablet Take 1 tablet by mouth every night at bedtime. 90 tablet 1   • benzonatate (TESSALON) 200 MG capsule Take 1 capsule by mouth 3 (Three) Times a Day As Needed for Cough. 20 capsule 0   • ibuprofen (ADVIL,MOTRIN) 400 MG tablet Take 1 tablet by mouth 2 (Two) Times a Day As Needed for Mild Pain . 180 tablet 1     No facility-administered medications prior to visit.        Patient Active Problem List   Diagnosis   • Atopic rhinitis   • Impacted cerumen   • Osteoarthritis of cervical spine   • Depression   • Tear film insufficiency   • Headache   • Hypertension   • Irritable bowel syndrome   • Sarcoidosis   • Dyssomnia   • Cobalamin deficiency   • Vitamin D deficiency   • Breast cancer (CMS/HCC)   • Preventative health care   • Mitral regurgitation   • Low back pain   • Left hip pain   • Chronic fatigue   • Lumbar spondylosis   • Atypical glandular cells of undetermined significance (PRITESH) on cervical Pap smear   • Use of tamoxifen (Nolvadex)   • Endometrial hyperplasia   • Greater trochanteric bursitis of left hip   • Gluteal tendinitis of left buttock   • Viral URI with cough  "      Advance Care Planning:  Patient has an advance directive - a copy has not been provided. Have asked the patient to send this to us to add to record    Identification of Risk Factors:  Risk factors include: cardiovascular risk.    Review of Systems   Constitutional: Negative.    Respiratory: Negative.    Cardiovascular: Negative.    Genitourinary: Negative.    Musculoskeletal:        Mild shoulder pain       Compared to one year ago, the patient feels her physical health is the same.  Compared to one year ago, the patient feels her mental health is the same.    Objective     Physical Exam   Constitutional: She is oriented to person, place, and time. She appears well-developed and well-nourished.   HENT:   Head: Normocephalic and atraumatic.   Right Ear: External ear normal.   Left Ear: External ear normal.   Eyes: Conjunctivae and EOM are normal. Pupils are equal, round, and reactive to light.   Neck: Normal range of motion. Neck supple. No JVD present. No tracheal deviation present. No thyromegaly present.   Cardiovascular: Normal rate, regular rhythm, normal heart sounds and intact distal pulses.   No murmur heard.  Pulmonary/Chest: Effort normal and breath sounds normal. No respiratory distress.   Abdominal: Soft. Bowel sounds are normal. She exhibits no distension. There is no tenderness.   Lymphadenopathy:     She has no cervical adenopathy.   Neurological: She is alert and oriented to person, place, and time. No cranial nerve deficit.   Skin: Skin is warm and dry. Capillary refill takes less than 2 seconds.   Psychiatric: She has a normal mood and affect. Her behavior is normal.   Nursing note and vitals reviewed.      Vitals:    06/03/19 0906   BP: 120/70   BP Location: Left arm   Patient Position: Sitting   Cuff Size: Adult   Pulse: 74   SpO2: 97%   Weight: 60.3 kg (133 lb)   Height: 162.6 cm (64.02\")   PainSc:   2   PainLoc: Back       Patient's Body mass index is 22.82 kg/m². BMI is within normal " parameters. No follow-up required..      Assessment/Plan   Patient Self-Management and Personalized Health Advice  The patient has been provided with information about: diet, exercise, prevention of cardiac or vascular disease and supplements and preventive services including:   · Advance directive.    Visit Diagnoses:    ICD-10-CM ICD-9-CM   1. Medicare annual wellness visit, initial Z00.00 V70.0       No orders of the defined types were placed in this encounter.      Outpatient Encounter Medications as of 6/3/2019   Medication Sig Dispense Refill   • amitriptyline (ELAVIL) 10 MG tablet TAKE 1 TABLET EVERY NIGHT 90 tablet 1   • betamethasone valerate (VALISONE) 0.1 % ointment      • citalopram (CeleXA) 20 MG tablet Take 1 tablet by mouth Daily. 90 tablet 3   • cycloSPORINE (RESTASIS) 0.05 % ophthalmic emulsion Apply 1 drop to eye Every 12 (Twelve) Hours.     • diclofenac (VOLTAREN) 1 % gel gel Apply 4 g topically to the appropriate area as directed 4 (Four) Times a Day As Needed (hip pain). 100 g 1   • enalapril (VASOTEC) 20 MG tablet TAKE 1/2 TABLET TWICE DAILY 90 tablet 1   • fexofenadine (ALLEGRA) 180 MG tablet Take 1 tablet by mouth Daily As Needed.     • fluticasone (FLONASE) 50 MCG/ACT nasal spray 2 sprays into each nostril daily. 3 each 3   • Multiple Vitamin (MULTIVITAMIN) capsule Take  by mouth daily.     • Omega-3 Fatty Acids (FISH OIL) 1000 MG capsule capsule Take  by mouth daily.     • polyvinyl alcohol-povidone (REFRESH) 1.4-0.6 % ophthalmic solution Apply 1 drop to eye 2 (two) times a day.     • tamoxifen (NOLVADEX) 20 MG chemo tablet Take  by mouth daily.     • traZODone (DESYREL) 100 MG tablet Take 1 tablet by mouth every night at bedtime. 90 tablet 1   • benzonatate (TESSALON) 200 MG capsule Take 1 capsule by mouth 3 (Three) Times a Day As Needed for Cough. 20 capsule 0   • ibuprofen (ADVIL,MOTRIN) 400 MG tablet Take 1 tablet by mouth 2 (Two) Times a Day As Needed for Mild Pain . 180 tablet 1      No facility-administered encounter medications on file as of 6/3/2019.        Reviewed use of high risk medication in the elderly: yes  Reviewed for potential of harmful drug interactions in the elderly: yes     The wellness exam has been reviewed in detail.  The patient has been fully counseled on preventative guidelines for vaccines, cancer screenings, and other health maintenance needs.  Functional testing has been performed to assess capacity for independent living and need for other medical interventions.   The patient was counseled on maintaining a lifestyle to promote good health and to minimize chronic diseases.  The patient has been assisted with scheduling healthcare procedures for the coming year and given a written document outlining these recommendations.    Follow Up:  No Follow-up on file.     An After Visit Summary and PPPS with all of these plans were given to the patient.

## 2019-06-03 NOTE — PATIENT INSTRUCTIONS
1.  Continue medications and supplements - as listed.    2.  Continue well-balanced diet - low in calories and low in added sugar.    3.  Maintain a routine exercise program - every week.    4.  Lab is looking into your results and what may have happened.    5.  If you desire physical therapy for your shoulder please call.

## 2019-06-10 ENCOUNTER — TELEPHONE (OUTPATIENT)
Dept: FAMILY MEDICINE CLINIC | Facility: CLINIC | Age: 64
End: 2019-06-10

## 2019-06-10 NOTE — TELEPHONE ENCOUNTER
Please contact patient regarding if needs to repeat the labs that were done because labs not found.

## 2019-06-11 DIAGNOSIS — I10 ESSENTIAL HYPERTENSION: ICD-10-CM

## 2019-06-11 DIAGNOSIS — E55.9 VITAMIN D DEFICIENCY: ICD-10-CM

## 2019-06-11 DIAGNOSIS — Z00.00 MEDICARE ANNUAL WELLNESS VISIT, SUBSEQUENT: ICD-10-CM

## 2019-06-14 NOTE — TELEPHONE ENCOUNTER
I called her initially last week when I was told by the lab that she would have to redo them, left message at 223 phone number, evidently [facility] subsequently found them and sent them. Her labs were found and look great. Cholesterol is adequate and unchanged (LDL 80, HDL 70), kidney and liver function are adequate, blood sugar is normal and at goal (fasting 84, A1c 5.4%), thyroid function is normal with TSH 1.4, Vitamin D level is at goal, blood counts were normal. No changes.

## 2019-06-14 NOTE — TELEPHONE ENCOUNTER
PATIENT IS VERY FRUSTRATED THAT SHE HAS NOT GOTTEN A CALL REGARDING HER RESULTS. WAS TOLD SHE WOULD HEAR FROM SOMEONE LAST WEEK. SHE IS WILLING TO DO LAB WORK OVER IF NEED BE. JUST WANTS AN ANSWER.     PLEASE ADVISE

## 2019-07-08 RX ORDER — CITALOPRAM 20 MG/1
TABLET ORAL
Qty: 90 TABLET | Refills: 3 | Status: SHIPPED | OUTPATIENT
Start: 2019-07-08 | End: 2019-12-03 | Stop reason: ALTCHOICE

## 2019-07-25 PROCEDURE — 87086 URINE CULTURE/COLONY COUNT: CPT | Performed by: FAMILY MEDICINE

## 2019-07-25 PROCEDURE — 87088 URINE BACTERIA CULTURE: CPT | Performed by: FAMILY MEDICINE

## 2019-07-25 PROCEDURE — 87186 SC STD MICRODIL/AGAR DIL: CPT | Performed by: FAMILY MEDICINE

## 2019-07-28 ENCOUNTER — TELEPHONE (OUTPATIENT)
Dept: URGENT CARE | Facility: CLINIC | Age: 64
End: 2019-07-28

## 2019-07-28 NOTE — TELEPHONE ENCOUNTER
Spoke with pt regarding her urine culture.  She reports feeling much better and will follow up with PCP as needed.  Verbalized understanding for antibiotic completion.

## 2019-08-19 ENCOUNTER — TELEPHONE (OUTPATIENT)
Dept: FAMILY MEDICINE CLINIC | Facility: CLINIC | Age: 64
End: 2019-08-19

## 2019-08-19 DIAGNOSIS — M25.519 ACUTE SHOULDER PAIN, UNSPECIFIED LATERALITY: Primary | ICD-10-CM

## 2019-08-19 NOTE — TELEPHONE ENCOUNTER
PER PT CALLED, STATED THE LAST TIME SHE WAS IN THE OFFICE  WAS SUPPOSED TO REFER HER OUT TO PHYSICAL THERAPY. PT STATED SHE CALLED AND SCHEDULED AN APPOINTMENT WITH [facility], BUT THEY NEED A REFERRAL. PLEASE SEND A REFERRAL.

## 2019-09-04 RX ORDER — TRAZODONE HYDROCHLORIDE 100 MG/1
TABLET ORAL
Qty: 90 TABLET | Refills: 0 | Status: SHIPPED | OUTPATIENT
Start: 2019-09-04 | End: 2019-12-03 | Stop reason: SDUPTHER

## 2019-09-05 ENCOUNTER — TREATMENT (OUTPATIENT)
Dept: PHYSICAL THERAPY | Facility: CLINIC | Age: 64
End: 2019-09-05

## 2019-09-05 DIAGNOSIS — M67.912 ROTATOR CUFF DYSFUNCTION, LEFT: Primary | ICD-10-CM

## 2019-09-05 PROCEDURE — 97110 THERAPEUTIC EXERCISES: CPT | Performed by: PHYSICAL THERAPIST

## 2019-09-05 PROCEDURE — 97161 PT EVAL LOW COMPLEX 20 MIN: CPT | Performed by: PHYSICAL THERAPIST

## 2019-09-05 NOTE — PATIENT INSTRUCTIONS
Issued initial HEP including supine shoulder ER w/ cane, SL shoulder ER, and scap retractions; discussed use of ice for pn mgmt

## 2019-09-05 NOTE — PROGRESS NOTES
Physical Therapy Initial Evaluation and Plan of Care      Patient: Etelvina Prescott   : 1955  Diagnosis/ICD-10 Code:  Rotator cuff dysfunction, left [M67.912]  Referring practitioner: MORE De Santiago  Date of Initial Visit: 2019  Today's Date: 2019  Patient seen for 1 sessions           Subjective Questionnaire: QuickDASH: 27.5%  Subjective Evaluation    History of Present Illness  Onset date: 6 months.  Mechanism of injury: Presents w/ complaint of post L shoulder pn for about 6 months, progressively worsened over the past 2-4 wks, radiating into the back and front of the arm. Arm feels weak. Moved 6 months ago, did a lot of lifting. 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 Picks up her young grandson. Pn was gradual in onset. Pn is constant, worse w/ lifting. Able to do most daily activities but w/ pain/difficulty. Unable to sleep on her L side. Denies pn beyond the elbow and denies numbness/tingling. Denies popping/clicking. Pn even occurs some at rest. Has not had imaging or been referred to Ortho.    Subjective comment: L shoulder pn, insidious  Patient Occupation: Retired   Precautions and Work Restrictions: n/aQuality of life: good    Pain  Current pain rating: 3  At best pain rating: 3  At worst pain ratin  Location: L shoulder blade, ant/post upper arm  Quality: sharp, radiating and dull ache  Alleviating factors: n/a.  Aggravating factors: lifting and movement  Progression: worsening    Social Support  Lives with: spouse    Hand dominance: left    Diagnostic Tests  No diagnostic tests performed    Treatments  No previous or current treatments  Patient Goals  Patient goals for therapy: decreased pain and increased strength           Treatment  Exercise 1  Exercise Name 1: supine ER AAROM w/ cane (elbow propped)  Sets/Reps 1: 10  Exercise 2  Exercise Name 2: SL shoulder ER w/ towel roll  Time 2: 10  Exercise 3  Exercise Name 3: scap retraction  Sets/Reps 3: 15             Objective        Tenderness     Left Shoulder   Tenderness in the biceps tendon (proximal) and subscapularis tendon.     Active Range of Motion   Left Shoulder   Flexion: 162 (pn top of shoulder, axilla) degrees   Abduction: 130 (pn from 90 deg up) degrees   External rotation 0°: 45 (pn ant upper arm) degrees   Internal rotation BTB: T11     Right Shoulder   Flexion: 160 degrees   Abduction: 180 degrees   External rotation 0°: 68 degrees   Internal rotation BTB: T6     Passive Range of Motion   Left Shoulder   Flexion: 160 degrees   Abduction: 160 degrees with pain  External rotation 0°: 55 degrees with pain  External rotation 90°: 57 degrees with pain    Joint Play   Left Shoulder  Joints within functional limits are the anterior capsule, posterior capsule, inferior capsule and long axis distraction.     Comments  Left anterior capsule comments: pn.     Strength/Myotome Testing     Left Shoulder     Planes of Motion   Flexion: 4   Abduction: 4-   External rotation at 0°: 4+   Internal rotation at 0°: 4 (pn ant upper arm)     Isolated Muscles   Supraspinatus: 4- (pn top of shoulder)     Right Shoulder   Normal muscle strength    Tests     Left Shoulder   Positive belly press, full can, Hawkin's, Neer's and painful arc.   Negative active compression (Owen), clunk, drop arm and Speed's.          Assessment & Plan     Assessment  Impairments: abnormal or restricted ROM, activity intolerance, impaired physical strength, lacks appropriate home exercise program and pain with function  Assessment details: Pt is a 64 YOF who presents w/ evolving symptoms of low complexity. Signs and symptoms consistent w/ rotator cuff tendinopathy. She exhibits decreased abd, IR, ER ROM; decreased cuff strength; profound pn and weakness w/ belly press/lift off tests indicating likely subscapularis involvement, and mild pn weakness upon full can testing indicating possible supraspinatus involvement as well. Pt has difficulty sleeping and performing  daily activities as a result of her pn. She may benefit from skilled PT services to address deficits and restore function.  Barriers to therapy: n/a  Prognosis: fair  Functional Limitations: carrying objects, lifting, sleeping, uncomfortable because of pain and reaching behind back  Goals  Plan Goals: STG 3 wks  1) Pt to be compliant w/ initial HEP for ROM and symptom mgmt.  2) Pt to report at least a 30% improvement in symptoms.  3) Pt to improve shoulder ROM to 60 deg ER or better.  4) Pt to improve behind back IR ROM to T10 or higher.  5) Pt to improve QD score to 20% impairment or better to reflect improved pn and function.    LTG 6 wks  1) Pt to be independent w/ long term HEP and self mgmt.  2) Pt to report at least a 60% improvement in symptoms.  3) Pt to improve shoulder ROM to 70 deg ER or better.  4) Pt to improve QD score to 15% impairment or better to reflect improved pn and function.           Plan  Therapy options: will be seen for skilled physical therapy services  Planned modality interventions: TENS and ultrasound  Planned therapy interventions: flexibility, functional ROM exercises, home exercise program, joint mobilization, manual therapy, neuromuscular re-education, postural training, soft tissue mobilization, strengthening, stretching and therapeutic activities  Frequency: 2x week  Duration in visits: 10  Treatment plan discussed with: patient  Plan details: PT POC to include shoulder mobility/ROM, progressive cuff strengthening, dynamic stabilization, manual therapy techniques, modalities as indicated for pn control        Timed:  Manual Therapy:    0     mins  77581;  Therapeutic Exercise:    10     mins  68479;     Neuromuscular Ja:    0    mins  09833;    Therapeutic Activity:     0     mins  96998;     Gait Trainin     mins  86564;     Ultrasound:     0     mins  17774;    Electrical Stimulation:    0     mins  91670 ( );    Untimed:  Electrical Stimulation:    0     mins   28935 ( );  Mechanical Traction:    0     mins  93380;     Timed Treatment:   10   mins   Total Treatment:     10   mins    PT SIGNATURE: Marbella Youssef, PT   DATE TREATMENT INITIATED: 9/5/2019    Initial Certification  Certification Period: 12/4/2019  I certify that the therapy services are furnished while this patient is under my care.  The services outlined above are required by this patient, and will be reviewed every 90 days.     PHYSICIAN: Anamaria Ortiz, MORE      DATE:     Please sign and return via fax to [phone].. Thank you, [facility].

## 2019-09-05 NOTE — PROGRESS NOTES
Physical Therapy Daily Progress Note  Visit: Visit count could not be calculated. Make sure you are using a visit which is associated with an episode.    Etelvina Prescott reports: ***    Subjective     Treatment  Pre Treatment Pn Score: {CKA numbers 0-10:82588}  Post Treatment Pn Score:  {CKA numbers 0-10:43330}                  Objective       Assessment/Plan           Timed:  Manual Therapy:    ***     mins  48951;  Therapeutic Exercise:    ***     mins  99059;     Neuromuscular Ja:    ***    mins  35187;    Therapeutic Activity:     ***     mins  64664;     Gait Training:      ***     mins  84816;     Ultrasound:     ***     mins  29460;    Electrical Stimulation:    ***     mins  74091 ( );    Untimed:  Electrical Stimulation:    ***     mins  28226 ( );  Mechanical Traction:    ***     mins  16700;     Timed Treatment:   ***   mins   Total Treatment:     ***   mins      Marbella Youssef, PT  Physical Therapist

## 2019-09-09 ENCOUNTER — OFFICE VISIT (OUTPATIENT)
Dept: PHYSICAL THERAPY | Facility: CLINIC | Age: 64
End: 2019-09-09

## 2019-09-09 DIAGNOSIS — M67.912 ROTATOR CUFF DYSFUNCTION, LEFT: Primary | ICD-10-CM

## 2019-09-09 PROCEDURE — 97110 THERAPEUTIC EXERCISES: CPT | Performed by: PHYSICAL THERAPIST

## 2019-09-09 NOTE — PROGRESS NOTES
Physical Therapy Daily Progress Note  Visit: 2    Etelvina Prescott reports: Believes her shoulder is feeling slightly better, though has not been able to work on HEP as frequently as she would like to. States that she is not having the weak feeling in her arm that she was experiencing.    Subjective     Treatment  Pre Treatment Pn Score: 0  Post Treatment Pn Score:  0    Exercise 1  Exercise Name 1: supine ER AAROM w/ cane (elbow propped)  Sets/Reps 1: 10  Exercise 2  Exercise Name 2: SL shoulder ER w/ towel roll  Time 2: 20  Exercise 3  Exercise Name 3: scap retraction  Sets/Reps 3: 30  Exercise 4  Exercise Name 4: AIG  Sets/Reps 4: 20  Exercise 5  Exercise Name 5: IR/ER isometrics  Sets/Reps 5: 10  Time 5: 3sec hold  Exercise 6  Exercise Name 6: Doorway ER stretch  Sets/Reps 6: 5             Objective       Active Range of Motion   Left Shoulder   Flexion: 157 degrees   External rotation 0°: 65 (after tx) degrees          Assessment & Plan     Assessment  Assessment details: Pt denied pn upon presenting to clinic today. She is doing well w/ her HEP, and tolerated exercise in clinic today w/o report of increased pn. ER ROM improved when measured prior to exercise today, and further improved after stretching and initiation of gentle isometrics into ER/IR. Updated HEP to include IR/ER isometrics and doorway ER stretch.    Plan  Plan details: Cont w/ scapular stabilization, gentle isometrics, and ROM activities as pt tolerates               Timed:  Manual Therapy:    0     mins  19424;  Therapeutic Exercise:    28     mins  62616;     Neuromuscular Ja:    0    mins  11161;    Therapeutic Activity:     0     mins  28222;     Gait Trainin     mins  95197;     Ultrasound:     0     mins  05736;    Electrical Stimulation:    0     mins  68851 ( );    Untimed:  Electrical Stimulation:    0     mins  04073 ( );  Mechanical Traction:    0     mins  71212;     Timed Treatment:   28   mins    Total Treatment:     28   mins      Marbella Youssef, PT  Physical Therapist

## 2019-09-17 ENCOUNTER — TREATMENT (OUTPATIENT)
Dept: PHYSICAL THERAPY | Facility: CLINIC | Age: 64
End: 2019-09-17

## 2019-09-17 DIAGNOSIS — M67.912 ROTATOR CUFF DYSFUNCTION, LEFT: Primary | ICD-10-CM

## 2019-09-17 PROCEDURE — 97110 THERAPEUTIC EXERCISES: CPT | Performed by: PHYSICAL THERAPIST

## 2019-09-17 PROCEDURE — A9999 DME SUPPLY OR ACCESSORY, NOS: HCPCS | Performed by: PHYSICAL THERAPIST

## 2019-09-17 NOTE — PATIENT INSTRUCTIONS
Issued updated HEP including IR/ER w/ RTB, scap retractions, prone horizontal abd w/ neutral rotation, prone rows, and AIG

## 2019-09-17 NOTE — PROGRESS NOTES
Physical Therapy Daily Progress Note  Visit: 3      Patient: Etelvina Prescott   : 1955  Referring practitioner: MORE De Santiago  Visit Diagnosis:     ICD-10-CM ICD-9-CM   1. Rotator cuff dysfunction, left M67.912 726.10     Date of Initial Visit: Type: THERAPY  Noted: 2019  Today's Date: 2019        Subjective     Etelvina Prescott reports: Pt states that she is seeing gradual improvement in her shoulder pn. However pn seems to have shifted from the post shoulder to the anterior upper arm. It bothers her most when she is doing her ER stretch at the doorway. She was able to  her 1 year old grandson w/o any pn.    Treatment  Pre Treatment Pn Score: 0  Post Treatment Pn Score:  0    Exercise 1  Exercise Name 1: IR w/ TB  Equipment/Resistance 1: RTB  Sets/Reps 1: 15  Exercise 2  Exercise Name 2: standing shoulder ER  Equipment/Resistance 2: RTB  Sets/Reps 2: 20  Exercise 3  Exercise Name 3: scap retraction  Sets/Reps 3: 30  Exercise 4  Exercise Name 4: AIG  Sets/Reps 4: 20  Exercise 5  Exercise Name 5: IR/ER isometrics  Sets/Reps 5: 10  Time 5: 3 sec hold  Exercise 6  Exercise Name 6: prone horizontal abd-neutral rotation  Sets/Reps 6: 15  Exercise 7  Exercise Name 7: Prone row  Sets/Reps 7: 15             Objective       Assessment & Plan     Assessment  Assessment details: Discontinued cane ER and doorway ER stretches due to complaint of increased pn. Pt able to perform IR/ER isotonics using TB through full ROM w/o reproduction of symptoms. Signs/symptoms continue to indicate subscapularis involvement. Pt did well w/ exercise progressions today and subjectively reports gradual improvement in her pn.    Plan  Plan details: Continue to progress as tolerated. Assess response to updated HEP.               Timed:  Manual Therapy:    0     mins  29473;  Therapeutic Exercise:    40     mins  88763;     Neuromuscular Ja:    0    mins  76751;    Therapeutic Activity:     0     mins  63076;      Gait Trainin     mins  53401;     Ultrasound:     0     mins  01226;    Electrical Stimulation:    0     mins  10389 ( );    Untimed:  Electrical Stimulation:    0     mins  64791 ( );  Mechanical Traction:    0     mins  05484;     Timed Treatment:   40   mins   Total Treatment:     40   mins      Marbella Youssef, PT  Physical Therapist

## 2019-09-20 ENCOUNTER — TREATMENT (OUTPATIENT)
Dept: PHYSICAL THERAPY | Facility: CLINIC | Age: 64
End: 2019-09-20

## 2019-09-20 DIAGNOSIS — M67.912 ROTATOR CUFF DYSFUNCTION, LEFT: Primary | ICD-10-CM

## 2019-09-20 PROCEDURE — 97110 THERAPEUTIC EXERCISES: CPT | Performed by: PHYSICAL THERAPIST

## 2019-09-20 NOTE — PROGRESS NOTES
Physical Therapy Daily Progress Note  Visit: 4      Patient: Etelvina Prescott   : 1955  Referring practitioner: MORE De Santiago  Visit Diagnosis:     ICD-10-CM ICD-9-CM   1. Rotator cuff dysfunction, left M67.912 726.10     Date of Initial Visit: Type: THERAPY  Noted: 2019  Today's Date: 2019        Subjective     Etelvina Prescott reports: Doing well, not having any pn today and has been minimal over the past few days. No longer having achy feeling in the ant shoulder at rest like she was. Very challenged by prone horizontal abd.    Treatment  Pre Treatment Pn Score: 0  Post Treatment Pn Score:  0    Exercise 1  Exercise Name 1: IR w/ TB  Equipment/Resistance 1: RTB  Sets/Reps 1: 30  Exercise 2  Exercise Name 2: standing shoulder ER  Equipment/Resistance 2: RTB  Sets/Reps 2: 30  Exercise 3  Exercise Name 3: scap retraction  Equipment/Resistance 3: RTB  Sets/Reps 3: 30  Exercise 4  Exercise Name 4: AIG  Sets/Reps 4: 20  Exercise 6  Exercise Name 6: prone horizontal abd-neutral rotation; w/ and w/o active assist  Sets/Reps 6: 15  Exercise 7  Exercise Name 7: Prone row  Sets/Reps 7: 30  Exercise 8  Exercise Name 8: Sidelying horizontal abd  Sets/Reps 8: 20             Objective       Active Range of Motion   Left Shoulder   External rotation 0°: 70 degrees          Assessment & Plan     Assessment  Assessment details: Pn continues to gradually diminish. Pt denies pn at rest, has occasional soreness w/ certain quick movements. Pt challenged by prone horizontal abd due to weakness, required some active assist today when performed in prone-transitioned to sidelying for HEP.    Plan  Plan details: Decreased frequency to 1x/wk, may consider transition to HEP after 2-3 more visits if no issues arise               Timed:  Manual Therapy:    0     mins  96210;  Therapeutic Exercise:    28     mins  20255;     Neuromuscular Ja:    0    mins  95813;    Therapeutic Activity:     0     mins  55672;      Gait Trainin     mins  33391;     Ultrasound:     0     mins  42768;    Electrical Stimulation:    0     mins  32956 ( );    Untimed:  Electrical Stimulation:    0     mins  74910 ( );  Mechanical Traction:    0     mins  47847;     Timed Treatment:   28   mins   Total Treatment:     30   mins      Marbella Youssef, PT  Physical Therapist

## 2019-09-24 ENCOUNTER — TREATMENT (OUTPATIENT)
Dept: PHYSICAL THERAPY | Facility: CLINIC | Age: 64
End: 2019-09-24

## 2019-09-24 DIAGNOSIS — M67.912 ROTATOR CUFF DYSFUNCTION, LEFT: Primary | ICD-10-CM

## 2019-09-24 PROCEDURE — 97110 THERAPEUTIC EXERCISES: CPT | Performed by: PHYSICAL THERAPIST

## 2019-09-24 NOTE — PROGRESS NOTES
Physical Therapy Daily Progress Note  Visit: 5      Patient: Etelvina Prescott   : 1955  Referring practitioner: MORE De Santiago  Visit Diagnosis:     ICD-10-CM ICD-9-CM   1. Rotator cuff dysfunction, left M67.912 726.10     Date of Initial Visit: Type: THERAPY  Noted: 2019  Today's Date: 2019        Subjective     Etelvina Prescott reports: Not having any pn at rest. Still having some pn that comes and goes w/ certain movements such as reaching back for her seat belt w/ her left hand. Pn shifts from back of upper arm to ant upper arm.    Treatment  Pre Treatment Pn Score: 0  Post Treatment Pn Score:  0    Exercise 1  Exercise Name 1: SL post capsule stretch  Equipment/Resistance 1: 3  Sets/Reps 1: 15 sec  Exercise 3  Exercise Name 3: scap retraction  Equipment/Resistance 3: RTB  Sets/Reps 3: 30  Exercise 4  Exercise Name 4: hand behind head pec stretch-supine  Sets/Reps 4: 3  Time 4: 15 sec  Exercise 6  Exercise Name 6: prone horizontal abd-neutral rotation; w/ and w/o active assist  Sets/Reps 6: 15  Exercise 7  Exercise Name 7: Prone row  Sets/Reps 7: 30  Exercise 8  Exercise Name 8: Sidelying horizontal abd  Sets/Reps 8: 20             Objective       Joint Play   Left Shoulder  Hypomobile in the posterior capsule.  Left Shoulder Flexibility Comments:   L pec major tightness  Right Shoulder Flexibility Comments:   L pec major tightness         Assessment & Plan     Assessment  Assessment details: Pt continues to struggle w/ prone horizontal abd. Noted today to have tightness in L pec major that may limit ROM w/ horizontal abd. Also noted to have post capsule tightness limiting IR. Added post capsule and pec stretching to HEP.    Plan  Plan details: Assess response to HEP modifications               Timed:  Manual Therapy:    0     mins  00817;  Therapeutic Exercise:    28     mins  50897;     Neuromuscular Ja:    0    mins  73457;    Therapeutic Activity:     0     mins  40105;     Gait  Trainin     mins  55226;     Ultrasound:     0     mins  05844;    Electrical Stimulation:    0     mins  54110 ( );    Untimed:  Electrical Stimulation:    0     mins  02103 ( );  Mechanical Traction:    0     mins  05779;     Timed Treatment:   28   mins   Total Treatment:     30   mins      Marbella Youssef, PT  Physical Therapist

## 2019-10-01 ENCOUNTER — TREATMENT (OUTPATIENT)
Dept: PHYSICAL THERAPY | Facility: CLINIC | Age: 64
End: 2019-10-01

## 2019-10-01 DIAGNOSIS — M67.912 ROTATOR CUFF DYSFUNCTION, LEFT: Primary | ICD-10-CM

## 2019-10-01 PROCEDURE — 97110 THERAPEUTIC EXERCISES: CPT | Performed by: PHYSICAL THERAPIST

## 2019-10-01 NOTE — PATIENT INSTRUCTIONS
Issued updated HEP including post capsule stretch, IR/ER w/ TB, AIG, lower trap lifts from wall, scap retractions, TB rows, and prone horizontal abd

## 2019-10-01 NOTE — PROGRESS NOTES
Re-Assessment / Re-Certification      Patient: Etelvina Prescott   : 1955  Diagnosis/ICD-10 Code:  Rotator cuff dysfunction, left [M67.912]  Referring practitioner: MORE De Santiago  Date of Initial Visit: Type: THERAPY  Noted: 2019  Today's Date: 10/1/2019  Patient seen for 6 sessions      Subjective:   Clinical Progress: improved  Home Program Compliance: Yes  Treatment has included: therapeutic exercise and manual therapy    Subjective    Etelvina Prescott reports: Reports continued pn radiating down the anterior upper arm and post shoulder. No longer having pn in the shoulder blade. Has soreness with reaching up and behind herself-grabbing seat belt, etc.    Pre tx pn score: 0  Post tx pn score: 0      Treatment  Exercise 1  Exercise Name 1: SL post capsule stretch  Equipment/Resistance 1: 3  Sets/Reps 1: 15 sec  Exercise 2  Exercise Name 2: Reassessment  Exercise 3  Exercise Name 3: scap retraction  Equipment/Resistance 3: RTB  Sets/Reps 3: 30  Exercise 4  Exercise Name 4: Lower trap lifts from wall  Sets/Reps 4: 10  Exercise 6  Exercise Name 6: prone horizontal abd-neutral rotation  Sets/Reps 6: 15  Exercise 7  Exercise Name 7: Prone row  Sets/Reps 7: 30                   Objective       Active Range of Motion   Left Shoulder   Flexion: 160 (pulling/sore top of shoulder) degrees   Abduction: 180 (mild pn lateral upper arm) degrees   External rotation 0°: 72 degrees     Passive Range of Motion   Left Shoulder   External rotation 0°: 75 degrees   External rotation 45°: 60 degrees   External rotation 90°: 53 degrees   Internal rotation 90°: 45 degrees     Right Shoulder   External rotation 90°: 90 degrees   Internal rotation 90°: 65 degrees     Additional Passive Range of Motion Details  Passive shoulder 90/90 ER improved to approx 80-85 w/ manual post glide applied      Strength/Myotome Testing     Left Shoulder     Isolated Muscles   Subscapularis: 4- (pn)   Supraspinatus: 4- (pn)     Tests      Left Shoulder   Positive belly press and full can.   Negative drop arm and Speed's.          Assessment & Plan     Assessment  Impairments: abnormal or restricted ROM, impaired physical strength, lacks appropriate home exercise program and pain with function  Assessment details: Pt is progressing well w/ PT. She has made significant gains in L shoulder mobility and reports resolution of previous periscapular pn. She now complains of pn that radiates into the lateral upper arm and down the front of the arm and limits her ability to reach overhead or back over her shoulder. She continues to exhibit signs of rotator cuff dysfunction/impingement involving the supraspinatus/subscapularis. Deficits include decreased IR/ER ROM, cuff and scapular stabilizer strength. She would benefit from continued PT services. Modified HEP today.  Prognosis: good  Functional Limitations: sleeping, uncomfortable because of pain, reaching behind back, reaching overhead and unable to perform repetitive tasks  Goals  Plan Goals: Plan Goals: STG 3 wks  1) Pt to be compliant w/ initial HEP for ROM and symptom mgmt.-MET  2) Pt to report at least a 30% improvement in symptoms.-MET  3) Pt to improve shoulder ROM to 60 deg ER or better.-MET  4) Pt to improve behind back IR ROM to T10 or higher.-ONGOING  5) Pt to improve QD score to 20% impairment or better to reflect improved pn and function. ONGOING    LTG 6 wks  1) Pt to be independent w/ long term HEP and self mgmt. ONGOING  2) Pt to report at least a 60% improvement in symptoms.ONGOING  3) Pt to improve shoulder ROM to 70 deg ER or better. MET  4) Pt to improve QD score to 15% impairment or better to reflect improved pn and function. ONGOING    Plan  Frequency: 1x week  Plan details: Cont w/ emphasis on scapular stabilization, cuff strengthening, capsular stretching. May plan referral back to MD if symptoms do not further improve.      Progress toward previous goals: Partially Met            PT Signature: Marbella Youssef, PT        Timed:  Manual Therapy:    0     mins  13417;  Therapeutic Exercise:    40     mins  89666;     Neuromuscular Ja:    0    mins  92957;    Therapeutic Activity:     0     mins  54482;     Gait Trainin     mins  77630;     Ultrasound:     0     mins  61468;    Electrical Stimulation:    0     mins  68550 ( );    Untimed:  Electrical Stimulation:    0     mins  39514 ( );  Mechanical Traction:    0     mins  26044;     Timed Treatment:   40   mins   Total Treatment:     45   mins

## 2019-10-08 ENCOUNTER — OFFICE VISIT (OUTPATIENT)
Dept: FAMILY MEDICINE CLINIC | Facility: CLINIC | Age: 64
End: 2019-10-08

## 2019-10-08 VITALS
DIASTOLIC BLOOD PRESSURE: 70 MMHG | BODY MASS INDEX: 23.22 KG/M2 | SYSTOLIC BLOOD PRESSURE: 120 MMHG | HEART RATE: 86 BPM | WEIGHT: 136 LBS | HEIGHT: 64 IN | OXYGEN SATURATION: 98 %

## 2019-10-08 DIAGNOSIS — M75.42 ROTATOR CUFF IMPINGEMENT SYNDROME OF LEFT SHOULDER: ICD-10-CM

## 2019-10-08 DIAGNOSIS — M75.82 TENDINITIS OF LEFT ROTATOR CUFF: Primary | ICD-10-CM

## 2019-10-08 PROCEDURE — 20610 DRAIN/INJ JOINT/BURSA W/O US: CPT | Performed by: FAMILY MEDICINE

## 2019-10-08 PROCEDURE — 99213 OFFICE O/P EST LOW 20 MIN: CPT | Performed by: FAMILY MEDICINE

## 2019-10-08 RX ORDER — TRIAMCINOLONE ACETONIDE 40 MG/ML
40 INJECTION, SUSPENSION INTRA-ARTICULAR; INTRAMUSCULAR ONCE
Status: COMPLETED | OUTPATIENT
Start: 2019-10-08 | End: 2019-10-08

## 2019-10-08 RX ADMIN — TRIAMCINOLONE ACETONIDE 40 MG: 40 INJECTION, SUSPENSION INTRA-ARTICULAR; INTRAMUSCULAR at 14:47

## 2019-10-08 NOTE — PATIENT INSTRUCTIONS
INSTRUCTIONS TO FOLLOW AFTER A CORTISONE INJECTION  1. The pain relieving medicine in the shot will wear off in the next 12-18 hours. After this, the pain at the site may return for a short time until the steroid takes effect 24-48 hours later.  2. Use ice tonight 30-60 minutes or longer to minimize swelling and discomfort that might follow the injection. (Either crushed ice in a plastic bag or crushed ice in an ice cap).  3. Begin heat tomorrow for at least one hour every day for one week. Place hot moist towels over the injection area, cover towel with plastic then apply a heating pad over the entire area.  4. Relative rest should be undertaken. You may resume normal non-painful activities.

## 2019-10-08 NOTE — PROGRESS NOTES
Subjective   Etelvina Prescott is a 64 y.o. female.     Chief Complaint   Patient presents with   • Procedure     shoulder       History of Present Illness     Etelvina Prescott presents today for   Chief Complaint   Patient presents with   • Procedure     shoulder     She has pain in her left shoulder.  This is been ongoing for the past couple of months.  She went to physical therapy and completed 6 weeks, and states that the pain in her shoulder blade and supraspinatus have improved, but she still has pain with certain activities including overhead activities and trying to reach behind her back.  The pain is mild to moderate but does impact her activities of daily living.    This patient is accompanied by their self who contributes to the history of their care.    The following portions of the patient's history were reviewed and updated as appropriate: allergies, current medications, past family history, past medical history, past social history, past surgical history and problem list.    Active Ambulatory Problems     Diagnosis Date Noted   • Atopic rhinitis 06/06/2016   • Impacted cerumen 06/06/2016   • Osteoarthritis of cervical spine 06/06/2016   • Depression 06/06/2016   • Tear film insufficiency 06/06/2016   • Headache 06/06/2016   • Hypertension 06/06/2016   • Irritable bowel syndrome 06/06/2016   • Sarcoidosis 06/06/2016   • Dyssomnia 06/06/2016   • Cobalamin deficiency 06/06/2016   • Vitamin D deficiency 06/06/2016   • Breast cancer (CMS/[facility])    • Preventative health care 06/07/2016   • Mitral regurgitation 08/12/2016   • Low back pain 08/14/2017   • Left hip pain 08/14/2017   • Chronic fatigue 08/14/2017   • Lumbar spondylosis 12/08/2017   • Atypical glandular cells of undetermined significance (PRITESH) on cervical Pap smear 08/29/2018   • Use of tamoxifen (Nolvadex) 09/01/2018   • Endometrial hyperplasia 09/01/2018   • Greater trochanteric bursitis of left hip 11/26/2018   • Gluteal tendinitis of left  buttock 2019   • Viral URI with cough 2019     Resolved Ambulatory Problems     Diagnosis Date Noted   • Right lower quadrant abdominal pain 2016   • Malignant neoplasm of breast (CMS/HCC) 2016   • Dysuria 2016   • Herpes zoster 2016   • Viral URI 2017   • Viral bronchitis 2017     Past Medical History:   Diagnosis Date   • Allergic rhinitis    • Breast cancer (CMS/[facility])    • Chronic anxiety Adulthood   • Chronic constipation Adulthood   • Chronic fatigue    • Depression    • Diverticulosis    • Dry eye syndrome    • Herpes zoster    • History of exposure to tuberculosis Remote   • Hypertension    • Impacted cerumen    • Irritable bowel syndrome    • MVP (mitral valve prolapse)    • Nephrolithiasis    • Plantar fasciitis    • Sarcoidosis    • Sleep disturbances    • Uterine bleeding      Past Surgical History:   Procedure Laterality Date   • BREAST BIOPSY Right     Benign   •  SECTION   &    • COLONOSCOPY  ,     Diverticulosis only.   • COLONOSCOPY W/ POLYPECTOMY  Remote   • DILATATION AND CURETTAGE     • HYSTEROSCOPY ENDOMETRIAL ABLATION      persistant bleeding   • MASTECTOMY Bilateral     bilateral with implants, breast cancer on the right     Family History   Problem Relation Age of Onset   • Alzheimer's disease Mother          age 89   • Hypothyroidism Mother    • Osteoporosis Mother    • Pulmonary embolism Mother    • Coronary artery disease Father    • Dementia Father    • Diabetes Father    • Hypertension Father    • Heart attack Father    • Hypothyroidism Sister    • Hypertension Sister    • Breast cancer Maternal Grandmother    • Stomach cancer Paternal Aunt    • Lung cancer Paternal Aunt      Social History     Socioeconomic History   • Marital status:      Spouse name: Not on file   • Number of children: Not on file   • Years of education: Not on file   • Highest  "education level: Not on file   Tobacco Use   • Smoking status: Never Smoker   • Smokeless tobacco: Never Used   Substance and Sexual Activity   • Alcohol use: Yes     Alcohol/week: 2.4 oz     Types: 4 Glasses of wine per week   • Drug use: No   • Sexual activity: No     Partners: Male   Social History Narrative    Domestic life   lives in private home with , who has advanced MS with paraplegia.        Yarsanism    Zoroastrian        Sleep hygiene    in bed midnight to 8 AM for 7 or 8 hours of sleep        Caffeine use    2 cups of coffee daily        Exercise habits    walks 30 minutes 4 days weekly.  Upper body strengthening 3 days weekly        Diet   well-balanced low salt diet        Occupation   lifelong .  Currently working part time.        Hearing : No impairment        Vision : Corrects with trifocal glasses        Driving : No limitations         Review of Systems   Constitutional: Negative.    Musculoskeletal: Positive for arthralgias.       Objective   Blood pressure 120/70, pulse 86, height 162.6 cm (64.02\"), weight 61.7 kg (136 lb), SpO2 98 %, not currently breastfeeding.  Nursing note reviewed  Physical Exam  Const: NAD, A&Ox4, Pleasant, Cooperative  MSK: Examination of the left shoulder reveals no swelling or effusion.  There is no overt tenderness to palpation globally around the rotator cuff or posterior subacromial space.  Range of motion in internal rotation is limited to L2.  There is pain with internal rotation, positive M[place] impingement testing.  Procedures   Procedure: Injection of the left subacromial bursa  Prior to performance of the shoulder injection, a discussion of this procedure and alternative treatments was conducted with the patient.  Possible complications were discussed, and all questions were answered.  An informed consent was reviewed with the patient, and a signed copy will be scanned into the chart.    Posterior lateral Approach  After informed consent " was obtained and signed, the patient was placed in a seated position.  The elbow was slightly flexed and the left arm was placed in slight internal rotation with the forearm resting on the patient's lap.  The area for injection was located 2 cm inferior to the posterior and lateral corner of the acromion. This site was marked with the retracted tip of a ballpoint pen.  The injection site was aseptically prepped with alcohol pads.  Ethyl chloride topical vapocoolant spray was used to achieve good local anesthesia.    Following the no touch technique, a 1.5-inch 25-gauge needle was inserted and advanced into the subacromial space.  After confirmation of the aspiration of bursal fluid and confirmation that there was no vascular infiltration of the needle tip, a mixture of 1 mL of 0.5% ropivacaine and 1 mL (40 mg) of triamcinolone acetonide 40 mg/mL was injected easily into the subacromial bursa.  No resistance was encountered.    The needle was withdrawn.  No bleeding was encountered.  The injection site was cleaned and a dry sterile dressing was applied.    The patient tolerated the procedure well without complications. she reported complete relief of pain within 5 minutes.  Assessment/Plan     Problem List Items Addressed This Visit     None      Visit Diagnoses     Tendinitis of left rotator cuff    -  Primary    Relevant Medications    triamcinolone acetonide (KENALOG-40) injection 40 mg (Start on 10/8/2019  2:46 PM)    Rotator cuff impingement syndrome of left shoulder        Relevant Medications    triamcinolone acetonide (KENALOG-40) injection 40 mg (Start on 10/8/2019  2:46 PM)        #1 rotator cuff impingement syndrome of the left shoulder  Injection today, tolerated well. Imaging reviewed today at point of care together with patient. Post-injection instructions reviewed with patient. I would like to see her back in about 6 weeks to assess her progress and response to injection.    Patient Instructions    INSTRUCTIONS TO FOLLOW AFTER A CORTISONE INJECTION  1. The pain relieving medicine in the shot will wear off in the next 12-18 hours. After this, the pain at the site may return for a short time until the steroid takes effect 24-48 hours later.  2. Use ice tonight 30-60 minutes or longer to minimize swelling and discomfort that might follow the injection. (Either crushed ice in a plastic bag or crushed ice in an ice cap).  3. Begin heat tomorrow for at least one hour every day for one week. Place hot moist towels over the injection area, cover towel with plastic then apply a heating pad over the entire area.  4. Relative rest should be undertaken. You may resume normal non-painful activities.      No Follow-up on file.    Ambulatory progress note signed and attested to by Marcio Bojorquez D.O.

## 2019-11-18 ENCOUNTER — TELEPHONE (OUTPATIENT)
Dept: FAMILY MEDICINE CLINIC | Facility: CLINIC | Age: 64
End: 2019-11-18

## 2019-11-18 RX ORDER — ENALAPRIL MALEATE 20 MG/1
TABLET ORAL
Qty: 90 TABLET | Refills: 0 | Status: SHIPPED | OUTPATIENT
Start: 2019-11-18 | End: 2019-11-26 | Stop reason: SDUPTHER

## 2019-11-18 NOTE — TELEPHONE ENCOUNTER
Labs completed in May looked great, no need for labs prior to appointment.  She does not need to be fasting.

## 2019-11-18 NOTE — TELEPHONE ENCOUNTER
Pt is due to follow up visit in Dec, she wants to know if this is fasting appt and if it is, can she can come in prior to appt to have these done? Please advise

## 2019-11-18 NOTE — TELEPHONE ENCOUNTER
Called and spoke with patient. Told her that her labs completed in May looked great and no need for additional labs at this time. Informed her that she doesn't need to be fasting for that appointment. Confirmed the date and time of appointment with patient. She verbalized understanding. She did not have any additional questions.

## 2019-11-26 ENCOUNTER — TELEPHONE (OUTPATIENT)
Dept: FAMILY MEDICINE CLINIC | Facility: CLINIC | Age: 64
End: 2019-11-26

## 2019-11-26 RX ORDER — ENALAPRIL MALEATE 20 MG/1
10 TABLET ORAL 2 TIMES DAILY
Qty: 30 TABLET | Refills: 0 | Status: SHIPPED | OUTPATIENT
Start: 2019-11-26 | End: 2020-02-18

## 2019-11-26 NOTE — TELEPHONE ENCOUNTER
Pt called stating her Enalapril 20 mg that was sent to [facility] Mail delivery hasnt got to her yet and she is been without this medication for two days, she is requesting a RX for Enalapril 20mg be sent to [facility] off [address]. Any questions call pt @  [phone]

## 2019-12-03 ENCOUNTER — OFFICE VISIT (OUTPATIENT)
Dept: FAMILY MEDICINE CLINIC | Facility: CLINIC | Age: 64
End: 2019-12-03

## 2019-12-03 VITALS
HEIGHT: 64 IN | DIASTOLIC BLOOD PRESSURE: 70 MMHG | HEART RATE: 83 BPM | WEIGHT: 133 LBS | SYSTOLIC BLOOD PRESSURE: 120 MMHG | OXYGEN SATURATION: 98 % | BODY MASS INDEX: 22.71 KG/M2

## 2019-12-03 DIAGNOSIS — I10 ESSENTIAL HYPERTENSION: Primary | ICD-10-CM

## 2019-12-03 DIAGNOSIS — R31.9 HEMATURIA, UNSPECIFIED TYPE: ICD-10-CM

## 2019-12-03 DIAGNOSIS — M75.42 ROTATOR CUFF IMPINGEMENT SYNDROME OF LEFT SHOULDER: ICD-10-CM

## 2019-12-03 LAB
BILIRUB BLD-MCNC: NEGATIVE MG/DL
CLARITY, POC: CLEAR
COLOR UR: YELLOW
GLUCOSE UR STRIP-MCNC: NEGATIVE MG/DL
KETONES UR QL: ABNORMAL
LEUKOCYTE EST, POC: NEGATIVE
NITRITE UR-MCNC: NEGATIVE MG/ML
PH UR: 6 [PH] (ref 5–8)
PROT UR STRIP-MCNC: NEGATIVE MG/DL
RBC # UR STRIP: ABNORMAL /UL
SP GR UR: 1.02 (ref 1–1.03)
UROBILINOGEN UR QL: NORMAL

## 2019-12-03 PROCEDURE — 81003 URINALYSIS AUTO W/O SCOPE: CPT | Performed by: FAMILY MEDICINE

## 2019-12-03 PROCEDURE — 99214 OFFICE O/P EST MOD 30 MIN: CPT | Performed by: FAMILY MEDICINE

## 2019-12-03 RX ORDER — ESCITALOPRAM OXALATE 10 MG/1
1 TABLET ORAL DAILY
COMMUNITY
Start: 2019-11-18 | End: 2020-01-07 | Stop reason: SINTOL

## 2019-12-03 NOTE — PATIENT INSTRUCTIONS
1.  Continue medications and supplements - as listed.    2.  Continue well-balanced diet - low in calories and low in added sugar.  -Plant-based diets have the best evidence for decreasing inflammation and chronic pain, as well as reducing the risk of heart disease and dementia.    3.  Maintain a routine exercise program - 30 minutes at least 3 days every week.  This is important even if you are active at your job.    4.  Try icing your shoulder at night for less pain in the morning. Consider 1-2 Motrin in evening with dinner as well.    5.  If you would like to have your labs completed prior to your next visit to be discussed at your appointment, please call the office 1 to 2 weeks before your scheduled visit to request that lab orders be placed.    6.  If culture is positive for bacteria, then we will treat with antibiotic. If negative for sign of infection, recommend rechecking in 2 weeks (around 12/16-17). If there is still blood we will refer to urology.    7.  Continue escitalopram 10mg. Ok to continue trazodone 150mg at night.

## 2019-12-03 NOTE — PROGRESS NOTES
Subjective   Etelvina Prescott is a 64 y.o. female.     Chief Complaint   Patient presents with   • Follow-up       History of Present Illness     Etelvina Prescott presents today for   Chief Complaint   Patient presents with   • Follow-up     She presents today for follow-up on her right shoulder rotator cuff impingement syndrome.  She received an injection 6 weeks ago. Shoulder is doing significantly better. Oncologist saw at beginning of November. Has had a rough summer, she saw [facility] who recommended she change her antidepressant. She changed her from citalopram to escitalopram. She continues to have difficulty sleeping/staying asleep. She has had opposing advice on whether she can take more trazodone (she increased from 100mg to 150mg).    This patient is accompanied by their self who contributes to the history of their care.    The following portions of the patient's history were reviewed and updated as appropriate: allergies, current medications, past family history, past medical history, past social history, past surgical history and problem list.    Active Ambulatory Problems     Diagnosis Date Noted   • Atopic rhinitis 06/06/2016   • Impacted cerumen 06/06/2016   • Osteoarthritis of cervical spine 06/06/2016   • Depression 06/06/2016   • Tear film insufficiency 06/06/2016   • Headache 06/06/2016   • Hypertension 06/06/2016   • Irritable bowel syndrome 06/06/2016   • Sarcoidosis 06/06/2016   • Dyssomnia 06/06/2016   • Cobalamin deficiency 06/06/2016   • Vitamin D deficiency 06/06/2016   • Breast cancer (CMS/[facility])    • Preventative health care 06/07/2016   • Mitral regurgitation 08/12/2016   • Low back pain 08/14/2017   • Left hip pain 08/14/2017   • Chronic fatigue 08/14/2017   • Lumbar spondylosis 12/08/2017   • Atypical glandular cells of undetermined significance (PRITESH) on cervical Pap smear 08/29/2018   • Use of tamoxifen (Nolvadex) 09/01/2018   • Endometrial hyperplasia  2018   • Greater trochanteric bursitis of left hip 2018   • Gluteal tendinitis of left buttock 2019   • Viral URI with cough 2019     Resolved Ambulatory Problems     Diagnosis Date Noted   • Right lower quadrant abdominal pain 2016   • Malignant neoplasm of breast (CMS/HCC) 2016   • Dysuria 2016   • Herpes zoster 2016   • Viral URI 2017   • Viral bronchitis 2017     Past Medical History:   Diagnosis Date   • Allergic rhinitis    • Breast cancer (CMS/HCC)    • Chronic anxiety Adulthood   • Chronic constipation Adulthood   • Chronic fatigue    • Depression    • Diverticulosis    • Dry eye syndrome    • Herpes zoster    • History of exposure to tuberculosis Remote   • Hypertension    • Impacted cerumen    • Irritable bowel syndrome    • MVP (mitral valve prolapse)    • Nephrolithiasis    • Plantar fasciitis    • Sarcoidosis    • Sleep disturbances    • Uterine bleeding      Past Surgical History:   Procedure Laterality Date   • BREAST BIOPSY Right     Benign   •  SECTION   &    • COLONOSCOPY  ,     Diverticulosis only.   • COLONOSCOPY W/ POLYPECTOMY  Remote   • DILATATION AND CURETTAGE     • HYSTEROSCOPY ENDOMETRIAL ABLATION      persistant bleeding   • MASTECTOMY Bilateral     bilateral with implants, breast cancer on the right     Family History   Problem Relation Age of Onset   • Alzheimer's disease Mother          age 89   • Hypothyroidism Mother    • Osteoporosis Mother    • Pulmonary embolism Mother    • Coronary artery disease Father    • Dementia Father    • Diabetes Father    • Hypertension Father    • Heart attack Father    • Hypothyroidism Sister    • Hypertension Sister    • Breast cancer Maternal Grandmother    • Stomach cancer Paternal Aunt    • Lung cancer Paternal Aunt      Social History     Socioeconomic History   • Marital status:      Spouse name:  "Not on file   • Number of children: Not on file   • Years of education: Not on file   • Highest education level: Not on file   Tobacco Use   • Smoking status: Never Smoker   • Smokeless tobacco: Never Used   Substance and Sexual Activity   • Alcohol use: Yes     Alcohol/week: 2.4 oz     Types: 4 Glasses of wine per week   • Drug use: No   • Sexual activity: No     Partners: Male   Social History Narrative    Domestic life   lives in private home with , who has advanced MS with paraplegia.        Scientology    Adventism        Sleep hygiene    in bed midnight to 8 AM for 7 or 8 hours of sleep        Caffeine use    2 cups of coffee daily        Exercise habits    walks 30 minutes 4 days weekly.  Upper body strengthening 3 days weekly        Diet   well-balanced low salt diet        Occupation   lifelong .  Currently working part time.        Hearing : No impairment        Vision : Corrects with trifocal glasses        Driving : No limitations       Review of Systems  Review of Systems -  General ROS: negative for - chills, fever or night sweats  Cardiovascular ROS: no chest pain or dyspnea on exertion  Gastrointestinal ROS: no abdominal pain, change in bowel habits, or black or bloody stools  Genito-Urinary ROS: no dysuria, trouble voiding, or gross hematuria    Objective   Blood pressure 120/70, pulse 83, height 162.6 cm (64.02\"), weight 60.3 kg (133 lb), SpO2 98 %, not currently breastfeeding.  Nursing note reviewed  Physical Exam  Const: NAD, A&Ox4, Pleasant, Cooperative  Eyes: EOMI, no conjunctivitis  ENT: No nasal discharge present, neck supple  Cardiac: Regular rate and rhythm, no cyanosis  Resp: Respiratory rate within normal limits, no increased work of breathing, no audible wheezing or retractions noted  GI: No distention or ascites  MSK: Motor and sensation grossly intact in bilateral upper extremities  Neurologic: CN II-XII grossly intact  Psych: Appropriate mood and behavior.  Skin: " Warm, dry  Procedures  Assessment/Plan   Problem List Items Addressed This Visit        Cardiovascular and Mediastinum    Hypertension - Primary      Other Visit Diagnoses     Rotator cuff impingement syndrome of left shoulder        Hematuria, unspecified type        Relevant Orders    POCT urinalysis dipstick, automated (Completed)    Urine Culture - Urine, Urine, Clean Catch        See patient diagnoses and orders along with patient instructions for assessment, plan, and changes to care for patient.    I have spent 25 minutes face-to-face with 15 minutes spent counseling the patient on the diagnoses, possible treatment options and outcomes, adjunctive and alternative treatments, and developing a care plan.  Time spent counseling on the possible risks and benefits of medications with high anticholinergic activity such as amitriptyline and trazodone.  She stopped amitriptyline over the summer on her own due to reading an article about association with Alzheimer's.  Patient was counseled that a link has not been proven or demonstrated consistently, but that associated risk of nonrestorative sleep and lack of sleep in addition to quality of life issues certainly outweigh any possible risks as it pertains to developing dementia, particularly at age 64.    Patient Instructions   1.  Continue medications and supplements - as listed.    2.  Continue well-balanced diet - low in calories and low in added sugar.  -Plant-based diets have the best evidence for decreasing inflammation and chronic pain, as well as reducing the risk of heart disease and dementia.    3.  Maintain a routine exercise program - 30 minutes at least 3 days every week.  This is important even if you are active at your job.    4.  Try icing your shoulder at night for less pain in the morning. Consider 1-2 Motrin in evening with dinner as well.    5.  If you would like to have your labs completed prior to your next visit to be discussed at your  appointment, please call the office 1 to 2 weeks before your scheduled visit to request that lab orders be placed.    6.  If culture is positive for bacteria, then we will treat with antibiotic. If negative for sign of infection, recommend rechecking in 2 weeks (around 12/16-17). If there is still blood we will refer to urology.    7.  Continue escitalopram 10mg. Ok to continue trazodone 150mg at night.      Return in about 6 months (around 6/3/2020) for Annual, Medicare Wellness.    Ambulatory progress note signed and attested to by Marcio Bojorquez D.O.

## 2019-12-07 LAB
BACTERIA UR CULT: ABNORMAL
BACTERIA UR CULT: ABNORMAL
OTHER ANTIBIOTIC SUSC ISLT: ABNORMAL

## 2019-12-09 DIAGNOSIS — R31.9 HEMATURIA, UNSPECIFIED TYPE: Primary | ICD-10-CM

## 2019-12-09 RX ORDER — NITROFURANTOIN 25; 75 MG/1; MG/1
100 CAPSULE ORAL 2 TIMES DAILY
Qty: 10 CAPSULE | Refills: 0 | Status: SHIPPED | OUTPATIENT
Start: 2019-12-09 | End: 2019-12-14

## 2020-01-06 ENCOUNTER — TELEPHONE (OUTPATIENT)
Dept: FAMILY MEDICINE CLINIC | Facility: CLINIC | Age: 65
End: 2020-01-06

## 2020-01-07 ENCOUNTER — LAB REQUISITION (OUTPATIENT)
Dept: LAB | Facility: HOSPITAL | Age: 65
End: 2020-01-07

## 2020-01-07 ENCOUNTER — OFFICE VISIT (OUTPATIENT)
Dept: FAMILY MEDICINE CLINIC | Facility: CLINIC | Age: 65
End: 2020-01-07

## 2020-01-07 VITALS
OXYGEN SATURATION: 97 % | HEIGHT: 64 IN | WEIGHT: 130 LBS | HEART RATE: 90 BPM | SYSTOLIC BLOOD PRESSURE: 140 MMHG | BODY MASS INDEX: 22.2 KG/M2 | DIASTOLIC BLOOD PRESSURE: 88 MMHG

## 2020-01-07 DIAGNOSIS — R35.0 FREQUENCY OF URINATION: Primary | ICD-10-CM

## 2020-01-07 DIAGNOSIS — R00.2 PALPITATIONS: ICD-10-CM

## 2020-01-07 DIAGNOSIS — Z00.00 ROUTINE GENERAL MEDICAL EXAMINATION AT A HEALTH CARE FACILITY: ICD-10-CM

## 2020-01-07 DIAGNOSIS — N30.91 CYSTITIS WITH HEMATURIA: ICD-10-CM

## 2020-01-07 LAB
BILIRUB BLD-MCNC: NEGATIVE MG/DL
CLARITY, POC: ABNORMAL
COLOR UR: YELLOW
GLUCOSE UR STRIP-MCNC: NEGATIVE MG/DL
KETONES UR QL: ABNORMAL
LEUKOCYTE EST, POC: ABNORMAL
NITRITE UR-MCNC: NEGATIVE MG/ML
PH UR: 6 [PH] (ref 5–8)
PROT UR STRIP-MCNC: ABNORMAL MG/DL
RBC # UR STRIP: ABNORMAL /UL
SP GR UR: 1.02 (ref 1–1.03)
UROBILINOGEN UR QL: NORMAL

## 2020-01-07 PROCEDURE — 81003 URINALYSIS AUTO W/O SCOPE: CPT | Performed by: FAMILY MEDICINE

## 2020-01-07 PROCEDURE — 36415 COLL VENOUS BLD VENIPUNCTURE: CPT | Performed by: FAMILY MEDICINE

## 2020-01-07 PROCEDURE — 99213 OFFICE O/P EST LOW 20 MIN: CPT | Performed by: FAMILY MEDICINE

## 2020-01-07 RX ORDER — CIPROFLOXACIN 500 MG/1
500 TABLET, FILM COATED ORAL 2 TIMES DAILY
Qty: 14 TABLET | Refills: 0 | Status: SHIPPED | OUTPATIENT
Start: 2020-01-07 | End: 2020-01-14

## 2020-01-07 RX ORDER — FLUOXETINE HYDROCHLORIDE 20 MG/1
1 CAPSULE ORAL DAILY
COMMUNITY
Start: 2019-12-17 | End: 2020-01-13

## 2020-01-07 NOTE — PATIENT INSTRUCTIONS
1.  Call doctor back regarding Prozac    2.  Complete antibiotic    3.  You have been referred for specialized imaging and/or specialist office consultation today.  You will be contacted by our referral coordinator or the specialist's office to schedule your appointment over the next 2 to 3 business days.  Please make sure to check your voicemail, and ensure that we have the correct phone number on file for you.  Sometimes, based on insurance requirements, referrals may take longer to schedule. However if you have not heard from anyone within 7 days, please call the office to check on your referral status.

## 2020-01-08 LAB
BASOPHILS # BLD AUTO: 0.05 10*3/MM3 (ref 0–0.2)
BASOPHILS NFR BLD AUTO: 0.5 % (ref 0–1.5)
BUN SERPL-MCNC: 9 MG/DL (ref 8–23)
BUN/CREAT SERPL: 13.6 (ref 7–25)
CALCIUM SERPL-MCNC: 9.5 MG/DL (ref 8.6–10.5)
CHLORIDE SERPL-SCNC: 101 MMOL/L (ref 98–107)
CO2 SERPL-SCNC: 28.3 MMOL/L (ref 22–29)
CREAT SERPL-MCNC: 0.66 MG/DL (ref 0.57–1)
EOSINOPHIL # BLD AUTO: 0 10*3/MM3 (ref 0–0.4)
EOSINOPHIL NFR BLD AUTO: 0 % (ref 0.3–6.2)
ERYTHROCYTE [DISTWIDTH] IN BLOOD BY AUTOMATED COUNT: 12.2 % (ref 12.3–15.4)
GLUCOSE SERPL-MCNC: 103 MG/DL (ref 65–99)
HCT VFR BLD AUTO: 37.9 % (ref 34–46.6)
HGB BLD-MCNC: 12.7 G/DL (ref 12–15.9)
IMM GRANULOCYTES # BLD AUTO: 0.04 10*3/MM3 (ref 0–0.05)
IMM GRANULOCYTES NFR BLD AUTO: 0.4 % (ref 0–0.5)
LYMPHOCYTES # BLD AUTO: 1.37 10*3/MM3 (ref 0.7–3.1)
LYMPHOCYTES NFR BLD AUTO: 12.5 % (ref 19.6–45.3)
MCH RBC QN AUTO: 30.4 PG (ref 26.6–33)
MCHC RBC AUTO-ENTMCNC: 33.5 G/DL (ref 31.5–35.7)
MCV RBC AUTO: 90.7 FL (ref 79–97)
MONOCYTES # BLD AUTO: 0.46 10*3/MM3 (ref 0.1–0.9)
MONOCYTES NFR BLD AUTO: 4.2 % (ref 5–12)
NEUTROPHILS # BLD AUTO: 9.06 10*3/MM3 (ref 1.7–7)
NEUTROPHILS NFR BLD AUTO: 82.4 % (ref 42.7–76)
NRBC BLD AUTO-RTO: 0 /100 WBC (ref 0–0.2)
PLATELET # BLD AUTO: 268 10*3/MM3 (ref 140–450)
POTASSIUM SERPL-SCNC: 4.4 MMOL/L (ref 3.5–5.2)
RBC # BLD AUTO: 4.18 10*6/MM3 (ref 3.77–5.28)
SODIUM SERPL-SCNC: 141 MMOL/L (ref 136–145)
TSH SERPL DL<=0.005 MIU/L-ACNC: 1.02 UIU/ML (ref 0.27–4.2)
WBC # BLD AUTO: 10.98 10*3/MM3 (ref 3.4–10.8)

## 2020-01-10 NOTE — PROGRESS NOTES
[facility]  [facility]      01/13/2020       Etelvina Prescott  [address] [facility] [address]  [unfilled]    1955    Marcio Bojorquez DO    Etelvina Prescott is a 64 y.o. female.      Subjective:     Chief Complaint:  Palpitations and Establish Care       HPI   Patient is a 64-year-old female with past medical history significant for hypertension, sarcoidosis and mitral valve prolapse who presents to the [facility] at the request of Dr. Bojorquez for evaluation of palpitations. She reports that she was sent to [facility] for anxiety and her citalopram was changed to lexapro back in November and she started having irregular heart beats/palpitations. She recently was changed to prozac and symptoms slightly slightly improved but still present.  Recent was changed to Zoloft and reports that her symptoms have improved over the last couple of days.  She describes the feeling as an irregular heartbeat.  Denies any chest pain or shortness of breath.  No history of arrhythmias.  She does report a history of mitral valve prolapse but has not had an echo in quite some time.    Patient Active Problem List   Diagnosis   • Atopic rhinitis   • Impacted cerumen   • Osteoarthritis of cervical spine   • Depression   • Tear film insufficiency   • Headache   • Hypertension   • Irritable bowel syndrome   • Sarcoidosis   • Dyssomnia   • Cobalamin deficiency   • Vitamin D deficiency   • Breast cancer (CMS/HCC)   • Preventative health care   • Mitral regurgitation   • Low back pain   • Left hip pain   • Chronic fatigue   • Lumbar spondylosis   • Atypical glandular cells of undetermined significance (PRITESH) on cervical Pap smear   • Use of tamoxifen (Nolvadex)   • Endometrial hyperplasia   • Greater trochanteric bursitis of left hip   • Gluteal tendinitis of left buttock   • Viral URI with cough       Past Medical History:   Diagnosis Date   • Allergic rhinitis    • Breast  cancer (CMS/HCC) 2010    Right -Taxotere and Cytoxan ×4.  Tamoxifen.  Mastectomy   • Chronic anxiety Adulthood    Good response to citalopram   • Chronic constipation Adulthood    MOM or MiraLAX   • Chronic fatigue    • Depression     Good response to amitriptyline and citalopram   • Diverticulosis 2017    Per colonoscopy   • Dry eye syndrome 2010    Moisturizers   • Herpes zoster    • History of exposure to tuberculosis Remote    PPDs  onward all negative   • Hypertension    • Impacted cerumen    • Irritable bowel syndrome    • MVP (mitral valve prolapse)     Recurring tachycardias   • Nephrolithiasis    • Plantar fasciitis    • Sarcoidosis     Active in eyes and lungs   • Sleep disturbances     Good response to amitriptyline and trazodone   • Uterine bleeding     Uterine ablation for bleeding and chronic iron deficiency       Past Surgical History:   Procedure Laterality Date   • BREAST BIOPSY Right     Benign   •  SECTION   &    • COLONOSCOPY  ,     Diverticulosis only.   • COLONOSCOPY W/ POLYPECTOMY  Remote   • DILATATION AND CURETTAGE  2016   • HYSTEROSCOPY ENDOMETRIAL ABLATION  2006    persistant bleeding   • MASTECTOMY Bilateral 2010    bilateral with implants, breast cancer on the right       Family History   Problem Relation Age of Onset   • Alzheimer's disease Mother          age 89   • Hypothyroidism Mother    • Osteoporosis Mother    • Pulmonary embolism Mother    • Coronary artery disease Father    • Dementia Father    • Diabetes Father    • Hypertension Father    • Heart attack Father    • Hypothyroidism Sister    • Hypertension Sister    • Breast cancer Maternal Grandmother    • Stomach cancer Paternal Aunt    • Lung cancer Paternal Aunt        Social History     Socioeconomic History   • Marital status:      Spouse name: Not on file   • Number of children: Not on file   • Years of education: Not on file   • Highest education  level: Not on file   Tobacco Use   • Smoking status: Never Smoker   • Smokeless tobacco: Never Used   Substance and Sexual Activity   • Alcohol use: Yes     Alcohol/week: 4.0 standard drinks     Types: 4 Glasses of wine per week   • Drug use: No   • Sexual activity: Never     Partners: Male   Social History Narrative    Domestic life   lives in private home with , who has advanced MS with paraplegia.        Pentecostal    Islam        Sleep hygiene    in bed midnight to 8 AM for 7 or 8 hours of sleep        Caffeine use    2 cups of coffee daily        Exercise habits    walks 30 minutes 4 days weekly.  Upper body strengthening 3 days weekly        Diet   well-balanced low salt diet        Occupation   lifelong .  Currently working part time.        Hearing : No impairment        Vision : Corrects with trifocal glasses        Driving : No limitations       Allergies   Allergen Reactions   • Penicillins Rash   • Sulfa Antibiotics Rash         Current Outpatient Medications:   •  ciprofloxacin (CIPRO) 500 MG tablet, Take 1 tablet by mouth 2 (Two) Times a Day for 7 days., Disp: 14 tablet, Rfl: 0  •  cycloSPORINE (RESTASIS) 0.05 % ophthalmic emulsion, Apply 1 drop to eye Every 12 (Twelve) Hours., Disp: , Rfl:   •  diclofenac (VOLTAREN) 1 % gel gel, Apply 4 g topically to the appropriate area as directed 4 (Four) Times a Day As Needed (hip pain)., Disp: 100 g, Rfl: 1  •  enalapril (VASOTEC) 20 MG tablet, Take 0.5 tablets by mouth 2 (Two) Times a Day., Disp: 30 tablet, Rfl: 0  •  fexofenadine (ALLEGRA) 180 MG tablet, Take 1 tablet by mouth Daily As Needed., Disp: , Rfl:   •  fluticasone (FLONASE) 50 MCG/ACT nasal spray, 2 sprays into each nostril daily., Disp: 3 each, Rfl: 3  •  ibuprofen (ADVIL,MOTRIN) 400 MG tablet, Take 1 tablet by mouth 2 (Two) Times a Day As Needed for Mild Pain ., Disp: 180 tablet, Rfl: 1  •  Multiple Vitamin (MULTIVITAMIN) capsule, Take  by mouth daily., Disp: , Rfl:   •   "polyvinyl alcohol-povidone (REFRESH) 1.4-0.6 % ophthalmic solution, Apply 1 drop to eye 2 (two) times a day., Disp: , Rfl:   •  sertraline (ZOLOFT) 25 MG tablet, Take 25 mg by mouth Daily., Disp: , Rfl:   •  tamoxifen (NOLVADEX) 20 MG chemo tablet, Take 20 mg by mouth Daily., Disp: , Rfl:   •  traZODone (DESYREL) 100 MG tablet, Take 1 tablet by mouth every night at bedtime., Disp: 90 tablet, Rfl: 1  •  Omega-3 Fatty Acids (FISH OIL) 1000 MG capsule capsule, Take  by mouth daily., Disp: , Rfl:     The following portions of the patient's history were reviewed today and updated as appropriate: allergies, current medications, past family history, past medical history, past social history, past surgical history and problem list     Review of Systems   Constitution: Negative for chills and fever.   HENT: Negative.    Eyes: Negative.    Cardiovascular: Positive for irregular heartbeat and palpitations. Negative for chest pain, claudication, cyanosis, dyspnea on exertion, leg swelling, near-syncope, orthopnea, paroxysmal nocturnal dyspnea and syncope.   Respiratory: Negative for cough, shortness of breath and snoring.    Endocrine: Negative.    Hematologic/Lymphatic: Does not bruise/bleed easily.   Skin: Negative for poor wound healing.   Musculoskeletal: Negative.    Gastrointestinal: Negative for abdominal pain, heartburn, hematemesis, melena, nausea and vomiting.   Genitourinary: Negative.  Negative for hematuria.   Neurological: Negative.    Psychiatric/Behavioral: Positive for depression. The patient is nervous/anxious.    Allergic/Immunologic: Negative.        Objective:     Vitals:    01/13/20 1259 01/13/20 1306   BP: 132/61 117/59   BP Location: Left arm Left arm   Patient Position: Sitting Standing   Cuff Size: Adult Adult   Pulse: 77 85   Resp: 19    Temp: 97.8 °F (36.6 °C)    SpO2: 98% 98%   Weight: 60.6 kg (133 lb 8 oz)    Height: 162.6 cm (64.02\")        Body mass index is 22.9 kg/m².    Physical Exam "   Constitutional: She is oriented to person, place, and time. She appears well-developed and well-nourished. No distress.   HENT:   Head: Normocephalic.   Eyes: Pupils are equal, round, and reactive to light. Conjunctivae are normal.   Neck: Neck supple. No JVD present. No thyromegaly present.   Cardiovascular: Normal rate, regular rhythm and intact distal pulses. Exam reveals no gallop and no friction rub.   Murmur (EVA I) heard.  Pulmonary/Chest: Effort normal and breath sounds normal. No respiratory distress. She has no wheezes. She has no rales. She exhibits no tenderness.   Abdominal: Soft. Bowel sounds are normal.   Musculoskeletal: Normal range of motion. She exhibits no edema.   Neurological: She is alert and oriented to person, place, and time.   Skin: Skin is warm and dry.   Psychiatric: She has a normal mood and affect. Her behavior is normal. Thought content normal.   Vitals reviewed.      Lab and Diagnostic Review:  Results for orders placed or performed in visit on 01/07/20   Urine Culture - Urine, Urine, Clean Catch   Result Value Ref Range    Urine Culture Final report (A)     Result 1 Escherichia coli (A)     Susceptibility Testing Comment    Basic Metabolic Panel   Result Value Ref Range    Glucose 103 (H) 65 - 99 mg/dL    BUN 9 8 - 23 mg/dL    Creatinine 0.66 0.57 - 1.00 mg/dL    eGFR Non African Am 90 >60 mL/min/1.73    eGFR African Am 109 >60 mL/min/1.73    BUN/Creatinine Ratio 13.6 7.0 - 25.0    Sodium 141 136 - 145 mmol/L    Potassium 4.4 3.5 - 5.2 mmol/L    Chloride 101 98 - 107 mmol/L    Total CO2 28.3 22.0 - 29.0 mmol/L    Calcium 9.5 8.6 - 10.5 mg/dL   TSH Rfx On Abnormal To Free T4   Result Value Ref Range    TSH 1.020 0.270 - 4.200 uIU/mL   POCT urinalysis dipstick, automated   Result Value Ref Range    Color Yellow Yellow, Straw, Dark Yellow, Sara    Clarity, UA Cloudy (A) Clear    Specific Gravity  1.020 1.005 - 1.030    pH, Urine 6.0 5.0 - 8.0    Leukocytes Moderate (2+) (A) Negative     Nitrite, UA Negative Negative    Protein, POC Trace (A) Negative mg/dL    Glucose, UA Negative Negative, 1000 mg/dL (3+) mg/dL    Ketones, UA 2+ (A) Negative    Urobilinogen, UA Normal Normal    Bilirubin Negative Negative    Blood, UA 3+ (A) Negative   CBC & Differential   Result Value Ref Range    WBC 10.98 (H) 3.40 - 10.80 10*3/mm3    RBC 4.18 3.77 - 5.28 10*6/mm3    Hemoglobin 12.7 12.0 - 15.9 g/dL    Hematocrit 37.9 34.0 - 46.6 %    MCV 90.7 79.0 - 97.0 fL    MCH 30.4 26.6 - 33.0 pg    MCHC 33.5 31.5 - 35.7 g/dL    RDW 12.2 (L) 12.3 - 15.4 %    Platelets 268 140 - 450 10*3/mm3    Neutrophil Rel % 82.4 (H) 42.7 - 76.0 %    Lymphocyte Rel % 12.5 (L) 19.6 - 45.3 %    Monocyte Rel % 4.2 (L) 5.0 - 12.0 %    Eosinophil Rel % 0.0 (L) 0.3 - 6.2 %    Basophil Rel % 0.5 0.0 - 1.5 %    Neutrophils Absolute 9.06 (H) 1.70 - 7.00 10*3/mm3    Lymphocytes Absolute 1.37 0.70 - 3.10 10*3/mm3    Monocytes Absolute 0.46 0.10 - 0.90 10*3/mm3    Eosinophils Absolute 0.00 0.00 - 0.40 10*3/mm3    Basophils Absolute 0.05 0.00 - 0.20 10*3/mm3    Immature Granulocyte Rel % 0.4 0.0 - 0.5 %    Immature Grans Absolute 0.04 0.00 - 0.05 10*3/mm3    nRBC 0.0 0.0 - 0.2 /100 WBC     EKG: Normal sinus rhythm, possible left atrial enlargement    Assessment and Plan:   1. Palpitations  - ECG 12 Lead; Future  - Holter Monitor - 72 Hour Up To 21 Days. Patient going on a cruise so requests to only wear monitor for one week, which is reasonable since symptoms improving  - Adult Transthoracic Echo Complete W/ Cont if Necessary Per Protocol; Future    2. Mitral valve prolapse  - Adult Transthoracic Echo Complete W/ Cont if Necessary Per Protocol; Future    3. Essential hypertension  Controlled    RV in 3 weeks      It has been a pleasure to participate in the care of this patient.  Patient was instructed to call the [facility] with any questions, concerns, or worsening symptoms.    *Please note that portions of this note were completed  with a voice recognition program. Efforts were made to edit the dictations, but occasionally words are mistranscribed.

## 2020-01-12 LAB
BACTERIA UR CULT: ABNORMAL
BACTERIA UR CULT: ABNORMAL
OTHER ANTIBIOTIC SUSC ISLT: ABNORMAL

## 2020-01-13 ENCOUNTER — OFFICE VISIT (OUTPATIENT)
Dept: CARDIOLOGY | Facility: HOSPITAL | Age: 65
End: 2020-01-13

## 2020-01-13 ENCOUNTER — HOSPITAL ENCOUNTER (OUTPATIENT)
Dept: CARDIOLOGY | Facility: HOSPITAL | Age: 65
Discharge: HOME | End: 2020-01-13
Admitting: NURSE PRACTITIONER

## 2020-01-13 ENCOUNTER — HOSPITAL ENCOUNTER (OUTPATIENT)
Dept: CARDIOLOGY | Facility: HOSPITAL | Age: 65
Discharge: HOME | End: 2020-01-13

## 2020-01-13 VITALS
WEIGHT: 133.5 LBS | SYSTOLIC BLOOD PRESSURE: 117 MMHG | BODY MASS INDEX: 22.79 KG/M2 | HEIGHT: 64 IN | OXYGEN SATURATION: 98 % | DIASTOLIC BLOOD PRESSURE: 59 MMHG | TEMPERATURE: 97.8 F | RESPIRATION RATE: 19 BRPM | HEART RATE: 85 BPM

## 2020-01-13 DIAGNOSIS — I10 ESSENTIAL HYPERTENSION: ICD-10-CM

## 2020-01-13 DIAGNOSIS — R00.2 PALPITATIONS: ICD-10-CM

## 2020-01-13 DIAGNOSIS — R00.2 PALPITATIONS: Primary | ICD-10-CM

## 2020-01-13 DIAGNOSIS — I34.1 MITRAL VALVE PROLAPSE: ICD-10-CM

## 2020-01-13 PROCEDURE — 99204 OFFICE O/P NEW MOD 45 MIN: CPT | Performed by: NURSE PRACTITIONER

## 2020-01-13 PROCEDURE — 0296T HC EXT ECG > 48HR TO 21 DAY RCRD W/CONECT INTL RCRD: CPT

## 2020-01-13 PROCEDURE — 93005 ELECTROCARDIOGRAM TRACING: CPT | Performed by: NURSE PRACTITIONER

## 2020-01-13 RX ORDER — SERTRALINE HYDROCHLORIDE 25 MG/1
25 TABLET, FILM COATED ORAL DAILY
COMMUNITY
End: 2020-10-07 | Stop reason: SDUPTHER

## 2020-01-13 NOTE — PROGRESS NOTES
[facility] Heart Monitor Documentation    Etelvina Prescott  1955  [id number]  01/13/20    MORE [employer]ipp[employer]    [] ZIO XT Patch  Model [id number] Prescribed for N/A Days    · Serial Number: (N + 9 Digits) N   · Apply-By Date on Box:   · USPS Tracking Number:  · USPS Tracking        [] [employer]ian MINI PLUS Mobile Cardiac Telemetry  Model BGMINIPLUS Prescribed for N/A Days    · Serial Number: (BGM + 7 Digits) BGM  · Shipped-By Date on Box:   · UPS Tracking Number: [id number]  · UPS Tracking      [] [employer] MINI Holter Monitor  Model BGMINIEL Prescribed for 7 Days    · Serial Number: (7 Digits) [id number]  · Shipped-By Date on Box: 01/07/2020  · UPS Tracking Number: [id number]  · UPS Tracking        This monitor was applied to the patient's chest and checked for proper functioning.  Ms. Etelvina Prescott was instructed in the proper use of this monitor.  She was given the opportunity to ask questions and left the office with the device 's instruction manual.    Dorothy Caldwell MA, 1:58 PM, 01/13/20                  [facility]

## 2020-01-20 ENCOUNTER — HOSPITAL ENCOUNTER (OUTPATIENT)
Dept: CARDIOLOGY | Facility: HOSPITAL | Age: 65
Discharge: HOME | End: 2020-01-20
Admitting: NURSE PRACTITIONER

## 2020-01-20 VITALS — WEIGHT: 133 LBS | BODY MASS INDEX: 22.71 KG/M2 | HEIGHT: 64 IN

## 2020-01-20 DIAGNOSIS — I34.1 MITRAL VALVE PROLAPSE: ICD-10-CM

## 2020-01-20 DIAGNOSIS — R00.2 PALPITATIONS: ICD-10-CM

## 2020-01-20 LAB
BH CV ECHO MEAS - AO MAX PG (FULL): 4.3 MMHG
BH CV ECHO MEAS - AO MAX PG: 9.4 MMHG
BH CV ECHO MEAS - AO MEAN PG (FULL): 2 MMHG
BH CV ECHO MEAS - AO MEAN PG: 5 MMHG
BH CV ECHO MEAS - AO ROOT AREA (BSA CORRECTED): 1.4
BH CV ECHO MEAS - AO ROOT AREA: 4.2 CM^2
BH CV ECHO MEAS - AO ROOT DIAM: 2.3 CM
BH CV ECHO MEAS - AO V2 MAX: 153 CM/SEC
BH CV ECHO MEAS - AO V2 MEAN: 97.8 CM/SEC
BH CV ECHO MEAS - AO V2 VTI: 29.1 CM
BH CV ECHO MEAS - AVA(I,A): 2.6 CM^2
BH CV ECHO MEAS - AVA(I,D): 2.6 CM^2
BH CV ECHO MEAS - AVA(V,A): 2.1 CM^2
BH CV ECHO MEAS - AVA(V,D): 2.1 CM^2
BH CV ECHO MEAS - BSA(HAYCOCK): 1.7 M^2
BH CV ECHO MEAS - BSA: 1.6 M^2
BH CV ECHO MEAS - BZI_BMI: 22.8 KILOGRAMS/M^2
BH CV ECHO MEAS - BZI_METRIC_HEIGHT: 162.6 CM
BH CV ECHO MEAS - BZI_METRIC_WEIGHT: 60.3 KG
BH CV ECHO MEAS - EDV(CUBED): 60.7 ML
BH CV ECHO MEAS - EDV(MOD-SP2): 40 ML
BH CV ECHO MEAS - EDV(MOD-SP4): 65 ML
BH CV ECHO MEAS - EDV(TEICH): 67.1 ML
BH CV ECHO MEAS - EF(CUBED): 82.7 %
BH CV ECHO MEAS - EF(MOD-SP2): 47.5 %
BH CV ECHO MEAS - EF(MOD-SP4): 56.9 %
BH CV ECHO MEAS - EF(TEICH): 76.1 %
BH CV ECHO MEAS - ESV(CUBED): 10.5 ML
BH CV ECHO MEAS - ESV(MOD-SP2): 21 ML
BH CV ECHO MEAS - ESV(MOD-SP4): 28 ML
BH CV ECHO MEAS - ESV(TEICH): 16 ML
BH CV ECHO MEAS - FS: 44.3 %
BH CV ECHO MEAS - IVS/LVPW: 0.93
BH CV ECHO MEAS - IVSD: 0.8 CM
BH CV ECHO MEAS - LA DIMENSION: 2.8 CM
BH CV ECHO MEAS - LA/AO: 1.2
BH CV ECHO MEAS - LAD MAJOR: 4.4 CM
BH CV ECHO MEAS - LAT PEAK E' VEL: 10.3 CM/SEC
BH CV ECHO MEAS - LATERAL E/E' RATIO: 9.2
BH CV ECHO MEAS - LV DIASTOLIC VOL/BSA (35-75): 39.5 ML/M^2
BH CV ECHO MEAS - LV MASS(C)D: 95.7 GRAMS
BH CV ECHO MEAS - LV MASS(C)DI: 58.2 GRAMS/M^2
BH CV ECHO MEAS - LV MAX PG: 5.1 MMHG
BH CV ECHO MEAS - LV MEAN PG: 3 MMHG
BH CV ECHO MEAS - LV SYSTOLIC VOL/BSA (12-30): 17 ML/M^2
BH CV ECHO MEAS - LV V1 MAX: 113 CM/SEC
BH CV ECHO MEAS - LV V1 MEAN: 77 CM/SEC
BH CV ECHO MEAS - LV V1 VTI: 26.6 CM
BH CV ECHO MEAS - LVIDD: 3.9 CM
BH CV ECHO MEAS - LVIDS: 2.2 CM
BH CV ECHO MEAS - LVLD AP2: 7.2 CM
BH CV ECHO MEAS - LVLD AP4: 7.7 CM
BH CV ECHO MEAS - LVLS AP2: 5.9 CM
BH CV ECHO MEAS - LVLS AP4: 6.4 CM
BH CV ECHO MEAS - LVOT AREA (M): 2.8 CM^2
BH CV ECHO MEAS - LVOT AREA: 2.8 CM^2
BH CV ECHO MEAS - LVOT DIAM: 1.9 CM
BH CV ECHO MEAS - LVPWD: 0.9 CM
BH CV ECHO MEAS - MED PEAK E' VEL: 12.2 CM/SEC
BH CV ECHO MEAS - MEDIAL E/E' RATIO: 7.8
BH CV ECHO MEAS - MV A MAX VEL: 91 CM/SEC
BH CV ECHO MEAS - MV DEC TIME: 0.28 SEC
BH CV ECHO MEAS - MV E MAX VEL: 94.9 CM/SEC
BH CV ECHO MEAS - MV E/A: 1
BH CV ECHO MEAS - PA ACC SLOPE: 645 CM/SEC^2
BH CV ECHO MEAS - PA ACC TIME: 0.15 SEC
BH CV ECHO MEAS - PA MAX PG: 5.7 MMHG
BH CV ECHO MEAS - PA PR(ACCEL): 12.4 MMHG
BH CV ECHO MEAS - PA V2 MAX: 119 CM/SEC
BH CV ECHO MEAS - SI(AO): 73.5 ML/M^2
BH CV ECHO MEAS - SI(CUBED): 30.5 ML/M^2
BH CV ECHO MEAS - SI(LVOT): 45.9 ML/M^2
BH CV ECHO MEAS - SI(MOD-SP2): 11.6 ML/M^2
BH CV ECHO MEAS - SI(MOD-SP4): 22.5 ML/M^2
BH CV ECHO MEAS - SI(TEICH): 31.1 ML/M^2
BH CV ECHO MEAS - SV(AO): 120.9 ML
BH CV ECHO MEAS - SV(CUBED): 50.2 ML
BH CV ECHO MEAS - SV(LVOT): 75.4 ML
BH CV ECHO MEAS - SV(MOD-SP2): 19 ML
BH CV ECHO MEAS - SV(MOD-SP4): 37 ML
BH CV ECHO MEAS - SV(TEICH): 51.1 ML
BH CV ECHO MEAS - TAPSE (>1.6): 3.2 CM2
BH CV ECHO MEASUREMENTS AVERAGE E/E' RATIO: 8.44
BH CV VAS BP LEFT ARM: NORMAL MMHG
BH CV XLRA - RV BASE: 3.5 CM
BH CV XLRA - RV LENGTH: 7.7 CM
BH CV XLRA - RV MID: 2.6 CM
BH CV XLRA - TDI S': 15.4 CM/SEC
LEFT ATRIUM VOLUME INDEX: 23.7 ML/M^2
LEFT ATRIUM VOLUME: 39 ML
LV EF 2D ECHO EST: 74 %

## 2020-01-20 PROCEDURE — 93306 TTE W/DOPPLER COMPLETE: CPT

## 2020-01-20 PROCEDURE — 93306 TTE W/DOPPLER COMPLETE: CPT | Performed by: INTERNAL MEDICINE

## 2020-01-21 ENCOUNTER — OFFICE VISIT (OUTPATIENT)
Dept: FAMILY MEDICINE CLINIC | Facility: CLINIC | Age: 65
End: 2020-01-21

## 2020-01-21 VITALS
WEIGHT: 132 LBS | HEIGHT: 64 IN | SYSTOLIC BLOOD PRESSURE: 140 MMHG | HEART RATE: 98 BPM | DIASTOLIC BLOOD PRESSURE: 80 MMHG | BODY MASS INDEX: 22.53 KG/M2 | OXYGEN SATURATION: 96 %

## 2020-01-21 DIAGNOSIS — N39.0 RECURRENT UTI: ICD-10-CM

## 2020-01-21 DIAGNOSIS — R35.0 FREQUENCY OF URINATION: Primary | ICD-10-CM

## 2020-01-21 DIAGNOSIS — G47.9 DYSSOMNIA: ICD-10-CM

## 2020-01-21 DIAGNOSIS — R00.2 PALPITATIONS: ICD-10-CM

## 2020-01-21 DIAGNOSIS — N30.91 CYSTITIS WITH HEMATURIA: ICD-10-CM

## 2020-01-21 LAB
BILIRUB BLD-MCNC: NEGATIVE MG/DL
CLARITY, POC: CLEAR
COLOR UR: YELLOW
GLUCOSE UR STRIP-MCNC: NEGATIVE MG/DL
KETONES UR QL: NEGATIVE
LEUKOCYTE EST, POC: NEGATIVE
NITRITE UR-MCNC: NEGATIVE MG/ML
PH UR: 6 [PH] (ref 5–8)
PROT UR STRIP-MCNC: NEGATIVE MG/DL
RBC # UR STRIP: ABNORMAL /UL
SP GR UR: 1.02 (ref 1–1.03)
UROBILINOGEN UR QL: NORMAL

## 2020-01-21 PROCEDURE — 81003 URINALYSIS AUTO W/O SCOPE: CPT | Performed by: FAMILY MEDICINE

## 2020-01-21 PROCEDURE — 99214 OFFICE O/P EST MOD 30 MIN: CPT | Performed by: FAMILY MEDICINE

## 2020-01-21 RX ORDER — TRAZODONE HYDROCHLORIDE 100 MG/1
150 TABLET ORAL
Qty: 135 TABLET | Refills: 3 | Status: SHIPPED | OUTPATIENT
Start: 2020-01-21 | End: 2021-03-09

## 2020-01-21 RX ORDER — CIPROFLOXACIN 500 MG/1
500 TABLET, FILM COATED ORAL 2 TIMES DAILY
Qty: 10 TABLET | Refills: 0 | Status: SHIPPED | OUTPATIENT
Start: 2020-01-21 | End: 2020-01-26

## 2020-01-21 RX ORDER — TRAZODONE HYDROCHLORIDE 100 MG/1
150 TABLET ORAL NIGHTLY
Qty: 30 TABLET | Refills: 0 | Status: SHIPPED | OUTPATIENT
Start: 2020-01-21 | End: 2020-02-10

## 2020-01-21 NOTE — PROGRESS NOTES
Subjective   Etelvina Prescott is a 64 y.o. female.     Chief Complaint   Patient presents with   • Follow-up       History of Present Illness     Etelvina Prescott presents today for   Chief Complaint   Patient presents with   • Follow-up     Here today of follow-up on palpitations and urinary frequency.  Labs were unremarkable after last visit, she completed Cipro.  Urine culture was E. coli that was pansensitive.  She had a mild leukocytosis to 10, possibly consistent with an early pyelonephritis but has done well with her course of Cipro.  She did see the cardiologist last week, who ordered an echo which was grossly unremarkable.  They have not yet called her with results.    This patient is accompanied by their self who contributes to the history of their care.    The following portions of the patient's history were reviewed and updated as appropriate: allergies, current medications, past family history, past medical history, past social history, past surgical history and problem list.    Active Ambulatory Problems     Diagnosis Date Noted   • Atopic rhinitis 06/06/2016   • Impacted cerumen 06/06/2016   • Osteoarthritis of cervical spine 06/06/2016   • Depression 06/06/2016   • Tear film insufficiency 06/06/2016   • Headache 06/06/2016   • Hypertension 06/06/2016   • Irritable bowel syndrome 06/06/2016   • Sarcoidosis 06/06/2016   • Dyssomnia 06/06/2016   • Cobalamin deficiency 06/06/2016   • Vitamin D deficiency 06/06/2016   • Breast cancer (CMS/[facility])    • Preventative health care 06/07/2016   • Mitral regurgitation 08/12/2016   • Low back pain 08/14/2017   • Left hip pain 08/14/2017   • Chronic fatigue 08/14/2017   • Lumbar spondylosis 12/08/2017   • Atypical glandular cells of undetermined significance (PRITESH) on cervical Pap smear 08/29/2018   • Use of tamoxifen (Nolvadex) 09/01/2018   • Endometrial hyperplasia 09/01/2018   • Greater trochanteric bursitis of left hip 11/26/2018   • Gluteal tendinitis of  left buttock 2019   • Viral URI with cough 2019     Resolved Ambulatory Problems     Diagnosis Date Noted   • Right lower quadrant abdominal pain 2016   • Malignant neoplasm of breast (CMS/HCC) 2016   • Dysuria 2016   • Herpes zoster 2016   • Viral URI 2017   • Viral bronchitis 2017     Past Medical History:   Diagnosis Date   • Allergic rhinitis    • Chronic anxiety Adulthood   • Chronic constipation Adulthood   • Diverticulosis    • Dry eye syndrome    • History of exposure to tuberculosis Remote   • MVP (mitral valve prolapse)    • Nephrolithiasis    • Plantar fasciitis    • Sleep disturbances    • Uterine bleeding      Past Surgical History:   Procedure Laterality Date   • BREAST BIOPSY Right     Benign   •  SECTION   &    • COLONOSCOPY  ,     Diverticulosis only.   • COLONOSCOPY W/ POLYPECTOMY  Remote   • DILATATION AND CURETTAGE     • HYSTEROSCOPY ENDOMETRIAL ABLATION      persistant bleeding   • MASTECTOMY Bilateral     bilateral with implants, breast cancer on the right     Family History   Problem Relation Age of Onset   • Alzheimer's disease Mother          age 89   • Hypothyroidism Mother    • Osteoporosis Mother    • Pulmonary embolism Mother    • Coronary artery disease Father    • Dementia Father    • Diabetes Father    • Hypertension Father    • Heart attack Father    • Hypothyroidism Sister    • Hypertension Sister    • Breast cancer Maternal Grandmother    • Stomach cancer Paternal Aunt    • Lung cancer Paternal Aunt      Social History     Socioeconomic History   • Marital status:      Spouse name: Not on file   • Number of children: Not on file   • Years of education: Not on file   • Highest education level: Not on file   Tobacco Use   • Smoking status: Never Smoker   • Smokeless tobacco: Never Used   Substance and Sexual Activity   • Alcohol use: Yes     Alcohol/week: 4.0  "standard drinks     Types: 4 Glasses of wine per week   • Drug use: No   • Sexual activity: Never     Partners: Male   Social History Narrative    Domestic life   lives in private home with , who has advanced MS with paraplegia.        Yarsani    Gnosticist        Sleep hygiene    in bed midnight to 8 AM for 7 or 8 hours of sleep        Caffeine use    2 cups of coffee daily        Exercise habits    walks 30 minutes 4 days weekly.  Upper body strengthening 3 days weekly        Diet   well-balanced low salt diet        Occupation   lifelong .  Currently working part time.        Hearing : No impairment        Vision : Corrects with trifocal glasses        Driving : No limitations       Review of Systems  Review of Systems -  General ROS: negative for - chills, fever or night sweats  Cardiovascular ROS: no chest pain or dyspnea on exertion  Gastrointestinal ROS: no abdominal pain, change in bowel habits, or black or bloody stools  Genito-Urinary ROS: no dysuria, trouble voiding, or hematuria    Objective   Blood pressure 140/80, pulse 98, height 162.6 cm (64.02\"), weight 59.9 kg (132 lb), SpO2 96 %, not currently breastfeeding.  Nursing note reviewed  Physical Exam  Const: NAD, A&Ox4, Pleasant, Cooperative  Eyes: EOMI, no conjunctivitis  ENT: No nasal discharge present, neck supple  Cardiac: Regular rate and rhythm, no cyanosis  Resp: Respiratory rate within normal limits, no increased work of breathing, no audible wheezing or retractions noted  GI: No distention or ascites  MSK: Motor and sensation grossly intact in bilateral upper extremities  Neurologic: CN II-XII grossly intact  Psych: Appropriate mood and behavior.  Skin: Warm, dry  Procedures  Assessment/Plan   Problem List Items Addressed This Visit        Other    Dyssomnia    Relevant Medications    traZODone (DESYREL) 100 MG tablet    traZODone (DESYREL) 100 MG tablet      Other Visit Diagnoses     Frequency of urination    -  " Primary    Relevant Medications    ciprofloxacin (CIPRO) 500 MG tablet    Other Relevant Orders    POCT urinalysis dipstick, automated (Completed)    Palpitations        Cystitis with hematuria        Relevant Medications    ciprofloxacin (CIPRO) 500 MG tablet    Other Relevant Orders    POCT urinalysis dipstick, automated (Completed)    Recurrent UTI        Relevant Medications    ciprofloxacin (CIPRO) 500 MG tablet    Other Relevant Orders    Ambulatory Referral to Urology        #1 recurrent UTI with hematuria  She has had 3 urinary tract infections in the last 6 months with cultures on 7/25/2019+ for E. coli, 12/3/2019+ for E faecalis, and 1/7/2020+ for E. coli.  Given the recurrent nature and persistence, I would like her to see a urologist to assess whether there is any concern for fistula.  She may only need pelvic floor physical therapy and improved hygiene, this was discussed with her and recommended as initial measures.  -UA today still has trace blood  -She will be going on a cruise for the next about 20 days, I did write a repeat prescription for the Cipro in case her symptoms return given her persistent trace hematuria    #2 palpitations  Continue follow-up with cardiology, will send back Holter monitor today  Echo reviewed today, benign and no contraindications to going on her cruise as far as I can see    #3 dyssomnia  Continue trazodone 100-150 mg nightly    See patient diagnoses and orders along with patient instructions for assessment, plan, and changes to care for patient.    Patient Instructions   1.  There is still trace blood in your urine which is not uncommon after urinary tract infections.  I would still recommend repeating a urinalysis in 4 weeks to ensure that the blood has cleared.      No follow-ups on file.    Ambulatory progress note signed and attested to by Marcio Bojorquez D.O.

## 2020-01-21 NOTE — PATIENT INSTRUCTIONS
1.  There is still trace blood in your urine which is not uncommon after urinary tract infections.  I would still recommend repeating a urinalysis in 4 weeks to ensure that the blood has cleared.

## 2020-01-22 ENCOUNTER — TELEPHONE (OUTPATIENT)
Dept: CARDIOLOGY | Facility: HOSPITAL | Age: 65
End: 2020-01-22

## 2020-01-22 NOTE — TELEPHONE ENCOUNTER
Patient called and had some more questions about her echo. Stated that her internist went over but she was still concerned. She is going out of town tomorrow but here is her cell phone number to reach her. [phone].

## 2020-02-06 PROCEDURE — 0298T HOLTER MONITOR - 72 HOUR UP TO 21 DAY: CPT | Performed by: INTERNAL MEDICINE

## 2020-02-07 NOTE — PROGRESS NOTES
"[facility]      02/10/2020         Etelvina Prescott  [address] [facility] [address]  [unfilled]    1955    Marcio Bojorquez DO    Etelvina Prescott is a 64 y.o. female.      Subjective:     Chief Complaint:  Palpitations and Follow-up       HPI   Patient is a 64-year-old female with past medical history significant for hypertension, breast cancer and sarcoidosis who presents today to follow up on palpitations. Patient wore extended Holter monitor which showed 3 brief ventricular runs with the longest lasting 4 beats.  PVC burden was less than 1%.  She had 31 supraventricular runs with the longest lasting 7 beats.  Ventricular runs could be aberrancy.  Most triggered events were normal sinus rhythm.  Echo showed normal left ventricular systolic function with no significant valve abnormalities. Symptoms started on Lexapro and since going on Zoloft she feels they are less. Still feels some \"bumps\" that feel like a hard beat in her stomach.  Worse at rest and when she tries to lay down and sleep at night.  She never feels them when she is active.  She is still struggling with some stress and anxiety.    Patient Active Problem List   Diagnosis   • Atopic rhinitis   • Impacted cerumen   • Osteoarthritis of cervical spine   • Depression   • Tear film insufficiency   • Headache   • Hypertension   • Irritable bowel syndrome   • Sarcoidosis   • Dyssomnia   • Cobalamin deficiency   • Vitamin D deficiency   • Breast cancer (CMS/HCC)   • Preventative health care   • Mitral regurgitation   • Low back pain   • Left hip pain   • Chronic fatigue   • Lumbar spondylosis   • Atypical glandular cells of undetermined significance (PRITESH) on cervical Pap smear   • Use of tamoxifen (Nolvadex)   • Endometrial hyperplasia   • Greater trochanteric bursitis of left hip   • Gluteal tendinitis of left buttock   • Viral URI with cough       Past Medical History:   Diagnosis Date   • " Allergic rhinitis    • Breast cancer (CMS/HCC) 2010    Right -Taxotere and Cytoxan ×4.  Tamoxifen.  Mastectomy   • Chronic anxiety Adulthood    Good response to citalopram   • Chronic constipation Adulthood    MOM or MiraLAX   • Chronic fatigue    • Depression     Good response to amitriptyline and citalopram   • Diverticulosis 2017    Per colonoscopy   • Dry eye syndrome 2010    Moisturizers   • Herpes zoster    • History of exposure to tuberculosis Remote    PPDs  onward all negative   • Hypertension    • Impacted cerumen    • Irritable bowel syndrome    • MVP (mitral valve prolapse)     Recurring tachycardias   • Nephrolithiasis    • Plantar fasciitis    • Sarcoidosis     Active in eyes and lungs   • Sleep disturbances     Good response to amitriptyline and trazodone   • Uterine bleeding     Uterine ablation for bleeding and chronic iron deficiency       Past Surgical History:   Procedure Laterality Date   • BREAST BIOPSY Right     Benign   •  SECTION   &    • COLONOSCOPY  ,     Diverticulosis only.   • COLONOSCOPY W/ POLYPECTOMY  Remote   • DILATATION AND CURETTAGE  2016   • HYSTEROSCOPY ENDOMETRIAL ABLATION  2006    persistant bleeding   • MASTECTOMY Bilateral 2010    bilateral with implants, breast cancer on the right       Family History   Problem Relation Age of Onset   • Alzheimer's disease Mother          age 89   • Hypothyroidism Mother    • Osteoporosis Mother    • Pulmonary embolism Mother    • Coronary artery disease Father    • Dementia Father    • Diabetes Father    • Hypertension Father    • Heart attack Father    • Hypothyroidism Sister    • Hypertension Sister    • Breast cancer Maternal Grandmother    • Stomach cancer Paternal Aunt    • Lung cancer Paternal Aunt        Social History     Socioeconomic History   • Marital status:      Spouse name: Not on file   • Number of children: Not on file   • Years of education: Not on  file   • Highest education level: Not on file   Tobacco Use   • Smoking status: Never Smoker   • Smokeless tobacco: Never Used   Substance and Sexual Activity   • Alcohol use: Yes     Alcohol/week: 4.0 standard drinks     Types: 4 Glasses of wine per week   • Drug use: No   • Sexual activity: Never     Partners: Male   Social History Narrative    Domestic life   lives in private home with , who has advanced MS with paraplegia.        Yarsani    Rastafari        Sleep hygiene    in bed midnight to 8 AM for 7 or 8 hours of sleep        Caffeine use    2 cups of coffee daily        Exercise habits    walks 30 minutes 4 days weekly.  Upper body strengthening 3 days weekly        Diet   well-balanced low salt diet        Occupation   lifelong .  Currently working part time.        Hearing : No impairment        Vision : Corrects with trifocal glasses        Driving : No limitations       Allergies   Allergen Reactions   • Penicillins Rash   • Sulfa Antibiotics Rash         Current Outpatient Medications:   •  cycloSPORINE (RESTASIS) 0.05 % ophthalmic emulsion, Apply 1 drop to eye Every 12 (Twelve) Hours., Disp: , Rfl:   •  diclofenac (VOLTAREN) 1 % gel gel, Apply 4 g topically to the appropriate area as directed 4 (Four) Times a Day As Needed (hip pain)., Disp: 100 g, Rfl: 1  •  enalapril (VASOTEC) 20 MG tablet, Take 0.5 tablets by mouth 2 (Two) Times a Day., Disp: 30 tablet, Rfl: 0  •  fexofenadine (ALLEGRA) 180 MG tablet, Take 1 tablet by mouth Daily As Needed., Disp: , Rfl:   •  fluticasone (FLONASE) 50 MCG/ACT nasal spray, 2 sprays into each nostril daily., Disp: 3 each, Rfl: 3  •  ibuprofen (ADVIL,MOTRIN) 400 MG tablet, Take 1 tablet by mouth 2 (Two) Times a Day As Needed for Mild Pain ., Disp: 180 tablet, Rfl: 1  •  Multiple Vitamin (MULTIVITAMIN) capsule, Take  by mouth daily., Disp: , Rfl:   •  polyvinyl alcohol-povidone (REFRESH) 1.4-0.6 % ophthalmic solution, Apply 1 drop to eye 2 (two)  "times a day., Disp: , Rfl:   •  sertraline (ZOLOFT) 25 MG tablet, Take 25 mg by mouth Daily., Disp: , Rfl:   •  tamoxifen (NOLVADEX) 20 MG chemo tablet, Take 20 mg by mouth Daily., Disp: , Rfl:   •  traZODone (DESYREL) 100 MG tablet, Take 1.5 tablets by mouth every night at bedtime., Disp: 135 tablet, Rfl: 3    The following portions of the patient's history were reviewed today and updated as appropriate: allergies, current medications, past family history, past medical history, past social history, past surgical history and problem list     Review of Systems   Constitution: Negative for chills and fever.   HENT: Negative.    Eyes: Negative.    Cardiovascular: Positive for irregular heartbeat and palpitations. Negative for chest pain, claudication, cyanosis, dyspnea on exertion, leg swelling, near-syncope, orthopnea, paroxysmal nocturnal dyspnea and syncope.   Respiratory: Negative for cough, shortness of breath and snoring.    Endocrine: Negative.    Hematologic/Lymphatic: Does not bruise/bleed easily.   Skin: Negative for poor wound healing.   Musculoskeletal: Negative.    Gastrointestinal: Negative for abdominal pain, heartburn, hematemesis, melena, nausea and vomiting.   Genitourinary: Negative.  Negative for hematuria.   Neurological: Negative.    Psychiatric/Behavioral: Positive for depression. The patient is nervous/anxious.    Allergic/Immunologic: Negative.        Objective:     Vitals:    02/10/20 1029   BP: 133/63   BP Location: Left arm   Patient Position: Sitting   Cuff Size: Adult   Pulse: 84   Resp: 18   Temp: 97.2 °F (36.2 °C)   TempSrc: Temporal   SpO2: 98%   Weight: 60.5 kg (133 lb 6 oz)   Height: 162.6 cm (64.02\")       Body mass index is 22.88 kg/m².    Physical Exam   Constitutional: She is oriented to person, place, and time. She appears well-developed and well-nourished. No distress.   HENT:   Head: Normocephalic.   Eyes: Pupils are equal, round, and reactive to light. Conjunctivae are normal. "   Neck: Neck supple. No JVD present. No thyromegaly present.   Cardiovascular: Normal rate, regular rhythm and intact distal pulses. Exam reveals no gallop and no friction rub.   No murmur heard.  Pulmonary/Chest: Effort normal and breath sounds normal. No respiratory distress. She has no wheezes. She has no rales. She exhibits no tenderness.   Abdominal: Soft. Bowel sounds are normal.   Musculoskeletal: Normal range of motion. She exhibits no edema.   Neurological: She is alert and oriented to person, place, and time.   Skin: Skin is warm and dry.   Psychiatric: She has a normal mood and affect. Her behavior is normal. Thought content normal.   Vitals reviewed.      Lab and Diagnostic Review:  Echo 1/20/20  · Estimated EF = 74%.  · Left ventricular systolic function is hyperdynamic (EF > 70).  · Left ventricular diastolic function is normal.  · Normal right ventricular cavity size, wall thickness, systolic function and septal motion noted.  · The aortic valve exhibits mild sclerosis.  · No evidence of pulmonary hypertension is present.  · There is no evidence of pericardial effusion.    Extended Holter monitor worn for close to 7 days showed an average heart rate of 83, minimum heart of 59 and a max rate of 124 bpm.  There are 3 ventricular runs the longest lasting 4 beats, which could have been SVT with aberrancy.  PVC burden less than 1%.  31 supraventricular runs with the longest lasting 7 beats.  PAC burden was less than 1%.    Assessment and Plan:   1. Palpitations  Brief supraventricular runs.  Possible brief ventricular runs but could be aberrancy.  She did not appear to be symptomatic during these episodes.  I suspect symptomatic PVCs, likely exacerbated by recent stress.  Discussed adding beta-blocker which she defers for right now.  He feels symptoms are improving.  I also offered her a referral to cardiology, but she feels that symptoms are mostly anxiety related and would like to give it more  time.    RV in 3 months.  Advised her that if symptoms worsen to call and will refer to cardiology or start beta blocker              It has been a pleasure to participate in the care of this patient.  Patient was instructed to call the [facility] with any questions, concerns, or worsening symptoms.    *Please note that portions of this note were completed with a voice recognition program. Efforts were made to edit the dictations, but occasionally words are mistranscribed.

## 2020-02-10 ENCOUNTER — OFFICE VISIT (OUTPATIENT)
Dept: CARDIOLOGY | Facility: HOSPITAL | Age: 65
End: 2020-02-10

## 2020-02-10 VITALS
HEIGHT: 64 IN | WEIGHT: 133.38 LBS | SYSTOLIC BLOOD PRESSURE: 133 MMHG | HEART RATE: 84 BPM | BODY MASS INDEX: 22.77 KG/M2 | OXYGEN SATURATION: 98 % | DIASTOLIC BLOOD PRESSURE: 63 MMHG | RESPIRATION RATE: 18 BRPM | TEMPERATURE: 97.2 F

## 2020-02-10 DIAGNOSIS — R00.2 PALPITATIONS: Primary | ICD-10-CM

## 2020-02-10 PROCEDURE — 99213 OFFICE O/P EST LOW 20 MIN: CPT | Performed by: NURSE PRACTITIONER

## 2020-02-18 RX ORDER — ENALAPRIL MALEATE 20 MG/1
TABLET ORAL
Qty: 7 TABLET | Refills: 0 | Status: SHIPPED | OUTPATIENT
Start: 2020-02-18 | End: 2020-02-21 | Stop reason: SDUPTHER

## 2020-02-21 ENCOUNTER — TELEPHONE (OUTPATIENT)
Dept: FAMILY MEDICINE CLINIC | Facility: CLINIC | Age: 65
End: 2020-02-21

## 2020-02-21 RX ORDER — ENALAPRIL MALEATE 20 MG/1
10 TABLET ORAL 2 TIMES DAILY
Qty: 90 TABLET | Refills: 1 | Status: SHIPPED | OUTPATIENT
Start: 2020-02-21 | End: 2020-06-05

## 2020-02-21 NOTE — TELEPHONE ENCOUNTER
Pt called because she usually receives a 90 day supply of enalapril (VASOTEC) 20 MG tablet when it is mail ordered. Pt stated that she got her medication filled at  [facility] and only received a 7 day supply. Pt is requesting that an order is sent to     [facility] mail delivery pharmacy: [phone]

## 2020-02-21 NOTE — TELEPHONE ENCOUNTER
Sent in 90 day supply to [facility].    Called patient, no answer. LVM for patient letting her know that refill was sent in to mail order pharmacy.

## 2020-04-01 ENCOUNTER — TELEPHONE (OUTPATIENT)
Dept: FAMILY MEDICINE CLINIC | Facility: CLINIC | Age: 65
End: 2020-04-01

## 2020-04-01 NOTE — TELEPHONE ENCOUNTER
Pt currently taking trazodone - would need to be seen to make any medication changes - telephone visit at worse case. [place] out of the office.  Please call pt.

## 2020-04-01 NOTE — TELEPHONE ENCOUNTER
PT CALLED REQUESTING A PRESCRIPTION FOR THE FOLLOWING MEDICATION:     OXAZEPAM    PT STATED SHE WAS PRESCRIBED THE MEDICATION BACK IN [id number]. PT STATES IT REALLY HELPED HER  SLEEP AND HER POSSIBLE BACK MUSCLE PAIN/SPASAMS    CONFIRMED PHARMACY:  [facility] [address] [phone] [place] [phone]       CONTACT: [phone]

## 2020-04-20 ENCOUNTER — TELEPHONE (OUTPATIENT)
Dept: FAMILY MEDICINE CLINIC | Facility: CLINIC | Age: 65
End: 2020-04-20

## 2020-04-20 RX ORDER — CIPROFLOXACIN 500 MG/1
500 TABLET, FILM COATED ORAL 2 TIMES DAILY
Qty: 10 TABLET | Refills: 0 | Status: SHIPPED | OUTPATIENT
Start: 2020-04-20 | End: 2020-04-25

## 2020-04-20 NOTE — TELEPHONE ENCOUNTER
"Spoke to patient.     She states she has had several UTI's in the past year.     She has seen Dr Davis in the past. \"Bladder and kidney test were normal\".     She is experiencing urinary frequency with urgency and burning.     She has some Cipro 500mg #10 that Dr Bojorquez gave her previously (to take on vacation with her). She started taking it yesterday.   "

## 2020-04-20 NOTE — TELEPHONE ENCOUNTER
PT CALLED STATED THAT SHE HAS A UTI, HAS BEEN AN ONGOING ISSUE HAS SEEN HER RHEUMATOLOGY BUT THEY ARE NOW CLOSED. WAS GIVEN ANTIBIOTIC TO TREAT UTI BUT DOES NOT HAVE WEEKS WORTH. PT IS NEEDING ANTIBIOTIC SENT TO PHARMACY.    PLEASE ADVISE.  CALL BACK:[phone]       [facility] [address]

## 2020-05-07 ENCOUNTER — TELEPHONE (OUTPATIENT)
Dept: FAMILY MEDICINE CLINIC | Facility: CLINIC | Age: 65
End: 2020-05-07

## 2020-05-07 NOTE — TELEPHONE ENCOUNTER
Contacted patient and advised her that we would need a urine sample to properly diagnose any urinary sx. She asked if it would be easier to be seen at [facility] or make an appt.     I advised her whichever is more convenient for her, she asked to be scheduled tomorrow.

## 2020-05-07 NOTE — TELEPHONE ENCOUNTER
Etelvian has been having UTI's and george like to speak to someone as to wether she should come in or have an rx called in.

## 2020-05-08 ENCOUNTER — OFFICE VISIT (OUTPATIENT)
Dept: FAMILY MEDICINE CLINIC | Facility: CLINIC | Age: 65
End: 2020-05-08

## 2020-05-08 VITALS
HEIGHT: 63 IN | HEART RATE: 81 BPM | WEIGHT: 136.5 LBS | SYSTOLIC BLOOD PRESSURE: 132 MMHG | OXYGEN SATURATION: 98 % | BODY MASS INDEX: 24.19 KG/M2 | DIASTOLIC BLOOD PRESSURE: 78 MMHG

## 2020-05-08 DIAGNOSIS — R30.0 DYSURIA: ICD-10-CM

## 2020-05-08 DIAGNOSIS — R30.0 DYSURIA: Primary | ICD-10-CM

## 2020-05-08 LAB
BILIRUB BLD-MCNC: NEGATIVE MG/DL
CLARITY, POC: ABNORMAL
COLOR UR: YELLOW
GLUCOSE UR STRIP-MCNC: NEGATIVE MG/DL
KETONES UR QL: ABNORMAL
LEUKOCYTE EST, POC: ABNORMAL
NITRITE UR-MCNC: NEGATIVE MG/ML
PH UR: 5.5 [PH] (ref 5–8)
PROT UR STRIP-MCNC: NEGATIVE MG/DL
RBC # UR STRIP: ABNORMAL /UL
SP GR UR: 1.02 (ref 1–1.03)
UROBILINOGEN UR QL: NORMAL

## 2020-05-08 PROCEDURE — 99213 OFFICE O/P EST LOW 20 MIN: CPT | Performed by: FAMILY MEDICINE

## 2020-05-08 PROCEDURE — 81003 URINALYSIS AUTO W/O SCOPE: CPT | Performed by: FAMILY MEDICINE

## 2020-05-08 RX ORDER — PHENAZOPYRIDINE HYDROCHLORIDE 95 MG/1
95 TABLET ORAL 3 TIMES DAILY PRN
Qty: 6 TABLET | Refills: 0 | Status: SHIPPED | OUTPATIENT
Start: 2020-05-08 | End: 2020-05-10

## 2020-05-08 NOTE — PROGRESS NOTES
Subjective   Etelvina Prescott is a 65 y.o. female.     Chief Complaint   Patient presents with   • Urinary Tract Infection     x5 days       History of Present Illness     Etelvina Prescott presents today for   Chief Complaint   Patient presents with   • Urinary Tract Infection     x5 days     UTI pain with urination for the last 5 days. No hematuria. Had similar symptoms a few weeks ago, took 5 days of cipro. Got better.    Pain underneath shoulder blades mostly on left. Does not want to go to PT yet but in the future maybe.    This patient is accompanied by their self who contributes to the history of their care.    The following portions of the patient's history were reviewed and updated as appropriate: allergies, current medications, past family history, past medical history, past social history, past surgical history and problem list.    Active Ambulatory Problems     Diagnosis Date Noted   • Atopic rhinitis 06/06/2016   • Impacted cerumen 06/06/2016   • Osteoarthritis of cervical spine 06/06/2016   • Depression 06/06/2016   • Tear film insufficiency 06/06/2016   • Headache 06/06/2016   • Hypertension 06/06/2016   • Irritable bowel syndrome 06/06/2016   • Sarcoidosis 06/06/2016   • Dyssomnia 06/06/2016   • Cobalamin deficiency 06/06/2016   • Vitamin D deficiency 06/06/2016   • Breast cancer (CMS/[facility])    • Preventative health care 06/07/2016   • Mitral regurgitation 08/12/2016   • Low back pain 08/14/2017   • Left hip pain 08/14/2017   • Chronic fatigue 08/14/2017   • Lumbar spondylosis 12/08/2017   • Atypical glandular cells of undetermined significance (PRITESH) on cervical Pap smear 08/29/2018   • Use of tamoxifen (Nolvadex) 09/01/2018   • Endometrial hyperplasia 09/01/2018   • Greater trochanteric bursitis of left hip 11/26/2018   • Gluteal tendinitis of left buttock 01/21/2019   • Viral URI with cough 02/09/2019     Resolved Ambulatory Problems     Diagnosis Date Noted   • Right lower quadrant abdominal  pain 2016   • Malignant neoplasm of breast (CMS/HCC) 2016   • Dysuria 2016   • Herpes zoster 2016   • Viral URI 2017   • Viral bronchitis 2017     Past Medical History:   Diagnosis Date   • Allergic rhinitis    • Chronic anxiety Adulthood   • Chronic constipation Adulthood   • Diverticulosis    • Dry eye syndrome    • History of exposure to tuberculosis Remote   • MVP (mitral valve prolapse)    • Nephrolithiasis    • Plantar fasciitis    • Sleep disturbances    • Uterine bleeding      Past Surgical History:   Procedure Laterality Date   • BREAST BIOPSY Right     Benign   •  SECTION   &    • COLONOSCOPY  ,     Diverticulosis only.   • COLONOSCOPY W/ POLYPECTOMY  Remote   • DILATATION AND CURETTAGE     • HYSTEROSCOPY ENDOMETRIAL ABLATION      persistant bleeding   • MASTECTOMY Bilateral     bilateral with implants, breast cancer on the right     Family History   Problem Relation Age of Onset   • Alzheimer's disease Mother          age 89   • Hypothyroidism Mother    • Osteoporosis Mother    • Pulmonary embolism Mother    • Coronary artery disease Father    • Dementia Father    • Diabetes Father    • Hypertension Father    • Heart attack Father    • Hypothyroidism Sister    • Hypertension Sister    • Breast cancer Maternal Grandmother    • Stomach cancer Paternal Aunt    • Lung cancer Paternal Aunt      Social History     Socioeconomic History   • Marital status:      Spouse name: Not on file   • Number of children: Not on file   • Years of education: Not on file   • Highest education level: Not on file   Tobacco Use   • Smoking status: Never Smoker   • Smokeless tobacco: Never Used   Substance and Sexual Activity   • Alcohol use: Yes     Alcohol/week: 4.0 standard drinks     Types: 4 Glasses of wine per week   • Drug use: No   • Sexual activity: Never     Partners: Male   Social History Narrative    Domestic  "life   lives in private home with , who has advanced MS with paraplegia.        Christian    Muslim        Sleep hygiene    in bed midnight to 8 AM for 7 or 8 hours of sleep        Caffeine use    2 cups of coffee daily        Exercise habits    walks 30 minutes 4 days weekly.  Upper body strengthening 3 days weekly        Diet   well-balanced low salt diet        Occupation   lifelong .  Currently working part time.        Hearing : No impairment        Vision : Corrects with trifocal glasses        Driving : No limitations       Review of Systems  Review of Systems -  General ROS: negative for - chills, fever or night sweats  Cardiovascular ROS: no chest pain or dyspnea on exertion  Gastrointestinal ROS: no abdominal pain, change in bowel habits, or black or bloody stools  Genito-Urinary ROS: no dysuria, trouble voiding, or hematuria    Objective   Blood pressure 132/78, pulse 81, height 160 cm (63\"), weight 61.9 kg (136 lb 8 oz), SpO2 98 %, not currently breastfeeding.  Nursing note reviewed  Physical Exam  Const: NAD, A&Ox4, Pleasant, Cooperative  Eyes: EOMI, no conjunctivitis  ENT: No nasal discharge present, neck supple  Cardiac: Regular rate and rhythm, no cyanosis  Resp: Respiratory rate within normal limits, no increased work of breathing, no audible wheezing or retractions noted  GI: No distention or ascites  MSK: Motor and sensation grossly intact in bilateral upper extremities  Neurologic: CN II-XII grossly intact  Psych: Appropriate mood and behavior.  Skin: Warm, dry  Procedures  Assessment/Plan   Problem List Items Addressed This Visit     None      Visit Diagnoses     Dysuria    -  Primary    Relevant Medications    phenazopyridine (PYRIDIUM) 95 MG tablet    Other Relevant Orders    POCT urinalysis dipstick, automated (Completed)    Urine Culture - Urine, Urine, Clean Catch          See patient diagnoses and orders along with patient instructions for assessment, plan, and " changes to care for patient.    Patient Instructions   Pelvic Floor Dysfunction    Pelvic floor dysfunction (PFD) is a condition that results when the group of muscles and connective tissues that support the organs in the pelvis (pelvic floor muscles) do not work well. These muscles and their connections form a sling that supports the colon and bladder. In men, these muscles also support the prostate gland. In women, they also support the uterus.  PFD causes pelvic floor muscles to be too weak, too tight, or a combination of both. In PFD, muscle movements are not coordinated. This condition may cause bowel or bladder problems. It may also cause pain.  What are the causes?  This condition may be caused by an injury to the pelvic area or by a weakening of pelvic muscles. This often results from pregnancy and childbirth or other types of strain. In many cases, the exact cause is not known.  What increases the risk?  The following factors may make you more likely to develop this condition:  · Having a condition of chronic bladder tissue inflammation (interstitial cystitis).  · Being an older person.  · Being overweight.  · Radiation treatment for cancer in the pelvic region.  · Previous pelvic surgery, such as removal of the uterus (hysterectomy) or prostate gland (prostatectomy).  What are the signs or symptoms?  Symptoms of this condition vary and may include:  · Bladder symptoms, such as:  ? Trouble starting urination and emptying the bladder.  ? Frequent urinary tract infections.  ? Leaking urine when coughing, laughing, or exercising (stress incontinence).  ? Having to pass urine urgently or frequently.  ? Pain when passing urine.  · Bowel symptoms, such as:  ? Constipation.  ? Urgent or frequent bowel movements.  ? Incomplete bowel movements.  ? Painful bowel movements.  ? Leaking stool or gas.  · Unexplained genital or rectal pain.  · Genital or rectal muscle spasms.  · Low back pain.  In women, symptoms of PFD  may also include:  · A heavy, full, or aching feeling in the vagina.  · A bulge that protrudes into the vagina.  · Pain during or after sexual intercourse.  How is this diagnosed?  This condition may be diagnosed based on:  · Your symptoms and medical history.  · A physical exam. During the exam, your health care provider may check your pelvic muscles for tightness, spasm, pain, or weakness. This may include a rectal exam and a pelvic exam for women.  In some cases, you may have diagnostic tests, such as:  · Electrical muscle function tests.  · Urine flow testing.  · X-ray tests of bowel function.  · Ultrasound of the pelvic organs.  How is this treated?  Treatment for this condition depends on your symptoms. Treatment options include:  · Physical therapy. This may include Kegel exercises to help relax or strengthen the pelvic floor muscles.  · Biofeedback. This type of therapy provides feedback on how tight your pelvic floor muscles are so that you can learn to control them.  · Internal or external massage therapy.  · A treatment that involves electrical stimulation of the pelvic floor muscles to help control pain (transcutaneous electrical nerve stimulation, or TENS).  · Sound wave therapy (ultrasound) to reduce muscle spasms.  · Medicines, such as:  ? Muscle relaxants.  ? Bladder control medicines.  Surgery to reconstruct or support pelvic floor muscles may be an option if other treatments do not help.  Follow these instructions at home:  Activity  · Do your usual activities as told by your health care provider. Ask your health care provider if you should modify any activities.  · Do pelvic floor strengthening or relaxing exercises at home as told by your physical therapist.  Lifestyle  · Maintain a healthy weight.  · Eat foods that are high in fiber, such as beans, whole grains, and fresh fruits and vegetables.  · Limit foods that are high in fat and processed sugars, such as fried or sweet foods.  · Manage  stress with relaxation techniques such as yoga or meditation.  General instructions  · If you have problems with leakage:  ? Use absorbable pads or wear padded underwear.  ? Wash frequently with mild soap.  ? Keep your genital and anal area as clean and dry as possible.  ? Ask your health care provider if you should try a barrier cream to prevent skin irritation.  · Take warm baths to relieve pelvic muscle tension or spasms.  · Take over-the-counter and prescription medicines only as told by your health care provider.  · Keep all follow-up visits as told by your health care provider. This is important.  Contact a health care provider if you:  · Are not improving with home care.  · Have signs or symptoms of PFD that get worse at home.  · Develop new signs or symptoms at home.  · Have signs of a urinary tract infection, such as:  ? Fever.  ? Chills.  ? Urinary frequency.  ? A burning feeling when urinating.  · Have not had a bowel movement in 3 days (constipation).  Summary  · Pelvic floor dysfunction results when the muscles and connective tissues in your pelvic floor do not work well.  · These muscles and their connections form a sling that supports your colon and bladder. In men, these muscles also support the prostate gland. In women, they also support the uterus.  · PFD may be caused by an injury to the pelvic area or by a weakening of pelvic muscles.  · PFD causes pelvic floor muscles to be too weak, too tight, or a combination of both. Symptoms may vary from person to person.  · In most cases, PFD can be treated with physical therapies and medicines. Surgery may be an option if other treatments do not help.  This information is not intended to replace advice given to you by your health care provider. Make sure you discuss any questions you have with your health care provider.  Document Released: 07/08/2019 Document Revised: 07/08/2019 Document Reviewed: 07/08/2019  [employer] Interactive Patient Education © 2020  [employer]        No follow-ups on file.    Ambulatory progress note signed and attested to by Marcio Bojorquez D.O.

## 2020-05-08 NOTE — PATIENT INSTRUCTIONS
Pelvic Floor Dysfunction    Pelvic floor dysfunction (PFD) is a condition that results when the group of muscles and connective tissues that support the organs in the pelvis (pelvic floor muscles) do not work well. These muscles and their connections form a sling that supports the colon and bladder. In men, these muscles also support the prostate gland. In women, they also support the uterus.  PFD causes pelvic floor muscles to be too weak, too tight, or a combination of both. In PFD, muscle movements are not coordinated. This condition may cause bowel or bladder problems. It may also cause pain.  What are the causes?  This condition may be caused by an injury to the pelvic area or by a weakening of pelvic muscles. This often results from pregnancy and childbirth or other types of strain. In many cases, the exact cause is not known.  What increases the risk?  The following factors may make you more likely to develop this condition:  · Having a condition of chronic bladder tissue inflammation (interstitial cystitis).  · Being an older person.  · Being overweight.  · Radiation treatment for cancer in the pelvic region.  · Previous pelvic surgery, such as removal of the uterus (hysterectomy) or prostate gland (prostatectomy).  What are the signs or symptoms?  Symptoms of this condition vary and may include:  · Bladder symptoms, such as:  ? Trouble starting urination and emptying the bladder.  ? Frequent urinary tract infections.  ? Leaking urine when coughing, laughing, or exercising (stress incontinence).  ? Having to pass urine urgently or frequently.  ? Pain when passing urine.  · Bowel symptoms, such as:  ? Constipation.  ? Urgent or frequent bowel movements.  ? Incomplete bowel movements.  ? Painful bowel movements.  ? Leaking stool or gas.  · Unexplained genital or rectal pain.  · Genital or rectal muscle spasms.  · Low back pain.  In women, symptoms of PFD may also include:  · A heavy, full, or aching feeling in  the vagina.  · A bulge that protrudes into the vagina.  · Pain during or after sexual intercourse.  How is this diagnosed?  This condition may be diagnosed based on:  · Your symptoms and medical history.  · A physical exam. During the exam, your health care provider may check your pelvic muscles for tightness, spasm, pain, or weakness. This may include a rectal exam and a pelvic exam for women.  In some cases, you may have diagnostic tests, such as:  · Electrical muscle function tests.  · Urine flow testing.  · X-ray tests of bowel function.  · Ultrasound of the pelvic organs.  How is this treated?  Treatment for this condition depends on your symptoms. Treatment options include:  · Physical therapy. This may include Kegel exercises to help relax or strengthen the pelvic floor muscles.  · Biofeedback. This type of therapy provides feedback on how tight your pelvic floor muscles are so that you can learn to control them.  · Internal or external massage therapy.  · A treatment that involves electrical stimulation of the pelvic floor muscles to help control pain (transcutaneous electrical nerve stimulation, or TENS).  · Sound wave therapy (ultrasound) to reduce muscle spasms.  · Medicines, such as:  ? Muscle relaxants.  ? Bladder control medicines.  Surgery to reconstruct or support pelvic floor muscles may be an option if other treatments do not help.  Follow these instructions at home:  Activity  · Do your usual activities as told by your health care provider. Ask your health care provider if you should modify any activities.  · Do pelvic floor strengthening or relaxing exercises at home as told by your physical therapist.  Lifestyle  · Maintain a healthy weight.  · Eat foods that are high in fiber, such as beans, whole grains, and fresh fruits and vegetables.  · Limit foods that are high in fat and processed sugars, such as fried or sweet foods.  · Manage stress with relaxation techniques such as yoga or  meditation.  General instructions  · If you have problems with leakage:  ? Use absorbable pads or wear padded underwear.  ? Wash frequently with mild soap.  ? Keep your genital and anal area as clean and dry as possible.  ? Ask your health care provider if you should try a barrier cream to prevent skin irritation.  · Take warm baths to relieve pelvic muscle tension or spasms.  · Take over-the-counter and prescription medicines only as told by your health care provider.  · Keep all follow-up visits as told by your health care provider. This is important.  Contact a health care provider if you:  · Are not improving with home care.  · Have signs or symptoms of PFD that get worse at home.  · Develop new signs or symptoms at home.  · Have signs of a urinary tract infection, such as:  ? Fever.  ? Chills.  ? Urinary frequency.  ? A burning feeling when urinating.  · Have not had a bowel movement in 3 days (constipation).  Summary  · Pelvic floor dysfunction results when the muscles and connective tissues in your pelvic floor do not work well.  · These muscles and their connections form a sling that supports your colon and bladder. In men, these muscles also support the prostate gland. In women, they also support the uterus.  · PFD may be caused by an injury to the pelvic area or by a weakening of pelvic muscles.  · PFD causes pelvic floor muscles to be too weak, too tight, or a combination of both. Symptoms may vary from person to person.  · In most cases, PFD can be treated with physical therapies and medicines. Surgery may be an option if other treatments do not help.  This information is not intended to replace advice given to you by your health care provider. Make sure you discuss any questions you have with your health care provider.  Document Released: 07/08/2019 Document Revised: 07/08/2019 Document Reviewed: 07/08/2019  [employer] Interactive Patient Education © 2020 [employer]

## 2020-05-11 ENCOUNTER — APPOINTMENT (OUTPATIENT)
Dept: CARDIOLOGY | Facility: HOSPITAL | Age: 65
End: 2020-05-11

## 2020-05-11 NOTE — PROGRESS NOTES
[facility]  Telemedicine note    05/11/2020         Etelvina Prescott  [address] [facility] [address]  [unfilled]    1955    Marcio Bojorquez DO    Etelvina Prescott is a 65 y.o. female.      Subjective:     Chief Complaint:  No chief complaint on file.       This was an *** enabled telemedicine encounter.    HPI   65-year-old female with history of hypertension, breast cancer and sarcoidosis scheduled today as a telemedicine follow-up on palpitations.  Patient wore extended Holter monitor January 2020 which showed 30 one brief supraventricular runs in 3 brief runs of NSVT vs SVT with aberrancy.Echo within normal limits.    Patient Active Problem List   Diagnosis   • Atopic rhinitis   • Impacted cerumen   • Osteoarthritis of cervical spine   • Depression   • Tear film insufficiency   • Headache   • Hypertension   • Irritable bowel syndrome   • Sarcoidosis   • Dyssomnia   • Cobalamin deficiency   • Vitamin D deficiency   • Breast cancer (CMS/HCC)   • Preventative health care   • Mitral regurgitation   • Low back pain   • Left hip pain   • Chronic fatigue   • Lumbar spondylosis   • Atypical glandular cells of undetermined significance (PRITESH) on cervical Pap smear   • Use of tamoxifen (Nolvadex)   • Endometrial hyperplasia   • Greater trochanteric bursitis of left hip   • Gluteal tendinitis of left buttock   • Viral URI with cough       Past Medical History:   Diagnosis Date   • Allergic rhinitis    • Breast cancer (CMS/HCC) 2010    Right -Taxotere and Cytoxan ×4.  Tamoxifen.  Mastectomy   • Chronic anxiety Adulthood    Good response to citalopram   • Chronic constipation Adulthood    MOM or MiraLAX   • Chronic fatigue    • Depression 1990    Good response to amitriptyline and citalopram   • Diverticulosis 2017    Per colonoscopy   • Dry eye syndrome 2010    Moisturizers   • Herpes zoster    • History of exposure to tuberculosis Remote    PPDs 1993 onward all  negative   • Hypertension    • Impacted cerumen    • Irritable bowel syndrome    • MVP (mitral valve prolapse)     Recurring tachycardias   • Nephrolithiasis    • Plantar fasciitis    • Sarcoidosis     Active in eyes and lungs   • Sleep disturbances     Good response to amitriptyline and trazodone   • Uterine bleeding     Uterine ablation for bleeding and chronic iron deficiency       Past Surgical History:   Procedure Laterality Date   • BREAST BIOPSY Right 1988    Benign   •  SECTION   &    • COLONOSCOPY  ,     Diverticulosis only.   • COLONOSCOPY W/ POLYPECTOMY  Remote   • DILATATION AND CURETTAGE     • HYSTEROSCOPY ENDOMETRIAL ABLATION      persistant bleeding   • MASTECTOMY Bilateral 2010    bilateral with implants, breast cancer on the right       Family History   Problem Relation Age of Onset   • Alzheimer's disease Mother          age 89   • Hypothyroidism Mother    • Osteoporosis Mother    • Pulmonary embolism Mother    • Coronary artery disease Father    • Dementia Father    • Diabetes Father    • Hypertension Father    • Heart attack Father    • Hypothyroidism Sister    • Hypertension Sister    • Breast cancer Maternal Grandmother    • Stomach cancer Paternal Aunt    • Lung cancer Paternal Aunt        Social History     Socioeconomic History   • Marital status:      Spouse name: Not on file   • Number of children: Not on file   • Years of education: Not on file   • Highest education level: Not on file   Tobacco Use   • Smoking status: Never Smoker   • Smokeless tobacco: Never Used   Substance and Sexual Activity   • Alcohol use: Yes     Alcohol/week: 4.0 standard drinks     Types: 4 Glasses of wine per week   • Drug use: No   • Sexual activity: Never     Partners: Male   Social History Narrative    Domestic life   lives in private home with , who has advanced MS with paraplegia.        Confucianist    Latter-day        Sleep hygiene     in bed midnight to 8 AM for 7 or 8 hours of sleep        Caffeine use    2 cups of coffee daily        Exercise habits    walks 30 minutes 4 days weekly.  Upper body strengthening 3 days weekly        Diet   well-balanced low salt diet        Occupation   lifelong .  Currently working part time.        Hearing : No impairment        Vision : Corrects with trifocal glasses        Driving : No limitations       Allergies   Allergen Reactions   • Penicillins Rash   • Sulfa Antibiotics Rash         Current Outpatient Medications:   •  cycloSPORINE (RESTASIS) 0.05 % ophthalmic emulsion, Apply 1 drop to eye Every 12 (Twelve) Hours., Disp: , Rfl:   •  diclofenac (VOLTAREN) 1 % gel gel, Apply 4 g topically to the appropriate area as directed 4 (Four) Times a Day As Needed (hip pain)., Disp: 100 g, Rfl: 1  •  enalapril (VASOTEC) 20 MG tablet, Take 0.5 tablets by mouth 2 (Two) Times a Day., Disp: 90 tablet, Rfl: 1  •  fexofenadine (ALLEGRA) 180 MG tablet, Take 1 tablet by mouth Daily As Needed., Disp: , Rfl:   •  fluticasone (FLONASE) 50 MCG/ACT nasal spray, 2 sprays into each nostril daily., Disp: 3 each, Rfl: 3  •  ibuprofen (ADVIL,MOTRIN) 400 MG tablet, Take 1 tablet by mouth 2 (Two) Times a Day As Needed for Mild Pain ., Disp: 180 tablet, Rfl: 1  •  Multiple Vitamin (MULTIVITAMIN) capsule, Take  by mouth daily., Disp: , Rfl:   •  polyvinyl alcohol-povidone (REFRESH) 1.4-0.6 % ophthalmic solution, Apply 1 drop to eye 2 (two) times a day., Disp: , Rfl:   •  sertraline (ZOLOFT) 25 MG tablet, Take 25 mg by mouth Daily., Disp: , Rfl:   •  tamoxifen (NOLVADEX) 20 MG chemo tablet, Take 20 mg by mouth Daily., Disp: , Rfl:   •  traZODone (DESYREL) 100 MG tablet, Take 1.5 tablets by mouth every night at bedtime., Disp: 135 tablet, Rfl: 3    The following portions of the patient's history were reviewed today and updated as appropriate: allergies, current medications, past family history, past medical history, past  social history, past surgical history and problem list     ROS    Objective:     There were no vitals filed for this visit.    There is no height or weight on file to calculate BMI.    Physical Exam    Lab and Diagnostic Review:  Results for orders placed or performed in visit on 05/08/20   POCT urinalysis dipstick, automated   Result Value Ref Range    Color Yellow Yellow, Straw, Dark Yellow, Sara    Clarity, UA Slightly Cloudy (A) Clear    Specific Gravity  1.025 1.005 - 1.030    pH, Urine 5.5 5.0 - 8.0    Leukocytes Small (1+) (A) Negative    Nitrite, UA Negative Negative    Protein, POC Negative Negative mg/dL    Glucose, UA Negative Negative, 1000 mg/dL (3+) mg/dL    Ketones, UA 1+ (A) Negative    Urobilinogen, UA Normal Normal    Bilirubin Negative Negative    Blood, UA Trace (A) Negative         Assessment and Plan:   There are no diagnoses linked to this encounter.    This visit has been scheduled as a *** visit to comply with patient safety concerns in accordance with CDC recommendations. Total time of discussion was *** minutes.      It has been a pleasure to participate in the care of this patient.  Patient was instructed to call the [facility] with any questions, concerns, or worsening symptoms.    *Please note that portions of this note were completed with a voice recognition program. Efforts were made to edit the dictations, but occasionally words are mistranscribed.

## 2020-06-05 RX ORDER — ENALAPRIL MALEATE 20 MG/1
TABLET ORAL
Qty: 90 TABLET | Refills: 1 | Status: SHIPPED | OUTPATIENT
Start: 2020-06-05 | End: 2021-01-27

## 2020-06-23 ENCOUNTER — TELEPHONE (OUTPATIENT)
Dept: FAMILY MEDICINE CLINIC | Facility: CLINIC | Age: 65
End: 2020-06-23

## 2020-06-23 NOTE — TELEPHONE ENCOUNTER
PATIENT CALLED CHECKING IF SHE NEEDS TO HAVE LAB WORK BEFORE HER APPOINTMENT ON 06/29    PATIENT CALL BACK    [phone]

## 2020-06-25 ENCOUNTER — LAB (OUTPATIENT)
Dept: LAB | Facility: HOSPITAL | Age: 65
End: 2020-06-25

## 2020-06-25 DIAGNOSIS — E55.9 VITAMIN D DEFICIENCY: ICD-10-CM

## 2020-06-25 DIAGNOSIS — I10 ESSENTIAL HYPERTENSION: ICD-10-CM

## 2020-06-25 DIAGNOSIS — E53.8 COBALAMIN DEFICIENCY: ICD-10-CM

## 2020-06-26 LAB
25(OH)D3+25(OH)D2 SERPL-MCNC: 32.6 NG/ML (ref 30–100)
ALBUMIN SERPL-MCNC: 4.5 G/DL (ref 3.8–4.8)
ALBUMIN/GLOB SERPL: 1.7 {RATIO} (ref 1.2–2.2)
ALP SERPL-CCNC: 53 IU/L (ref 39–117)
ALT SERPL-CCNC: 16 IU/L (ref 0–32)
APPEARANCE UR: ABNORMAL
AST SERPL-CCNC: 22 IU/L (ref 0–40)
BACTERIA #/AREA URNS HPF: ABNORMAL /[HPF]
BASOPHILS # BLD AUTO: 0.1 X10E3/UL (ref 0–0.2)
BASOPHILS NFR BLD AUTO: 2 %
BILIRUB SERPL-MCNC: 0.4 MG/DL (ref 0–1.2)
BILIRUB UR QL STRIP: NEGATIVE
BUN SERPL-MCNC: 12 MG/DL (ref 8–27)
BUN/CREAT SERPL: 18 (ref 12–28)
CALCIUM SERPL-MCNC: 9.6 MG/DL (ref 8.7–10.3)
CHLORIDE SERPL-SCNC: 102 MMOL/L (ref 96–106)
CHOLEST SERPL-MCNC: 174 MG/DL (ref 100–199)
CO2 SERPL-SCNC: 27 MMOL/L (ref 20–29)
COLOR UR: YELLOW
CREAT SERPL-MCNC: 0.67 MG/DL (ref 0.57–1)
EOSINOPHIL # BLD AUTO: 0.2 X10E3/UL (ref 0–0.4)
EOSINOPHIL NFR BLD AUTO: 3 %
EPI CELLS #/AREA URNS HPF: >10 /HPF (ref 0–10)
ERYTHROCYTE [DISTWIDTH] IN BLOOD BY AUTOMATED COUNT: 12.8 % (ref 11.7–15.4)
GLOBULIN SER CALC-MCNC: 2.7 G/DL (ref 1.5–4.5)
GLUCOSE SERPL-MCNC: 92 MG/DL (ref 65–99)
GLUCOSE UR QL: NEGATIVE
HCT VFR BLD AUTO: 39.7 % (ref 34–46.6)
HDLC SERPL-MCNC: 72 MG/DL
HGB BLD-MCNC: 12.9 G/DL (ref 11.1–15.9)
HGB UR QL STRIP: ABNORMAL
IMM GRANULOCYTES # BLD AUTO: 0 X10E3/UL (ref 0–0.1)
IMM GRANULOCYTES NFR BLD AUTO: 0 %
KETONES UR QL STRIP: ABNORMAL
LDLC SERPL CALC-MCNC: 83 MG/DL (ref 0–99)
LEUKOCYTE ESTERASE UR QL STRIP: ABNORMAL
LYMPHOCYTES # BLD AUTO: 2.2 X10E3/UL (ref 0.7–3.1)
LYMPHOCYTES NFR BLD AUTO: 36 %
MCH RBC QN AUTO: 30.4 PG (ref 26.6–33)
MCHC RBC AUTO-ENTMCNC: 32.5 G/DL (ref 31.5–35.7)
MCV RBC AUTO: 93 FL (ref 79–97)
MICRO URNS: ABNORMAL
MONOCYTES # BLD AUTO: 0.4 X10E3/UL (ref 0.1–0.9)
MONOCYTES NFR BLD AUTO: 6 %
MUCOUS THREADS URNS QL MICRO: PRESENT
NEUTROPHILS # BLD AUTO: 3.3 X10E3/UL (ref 1.4–7)
NEUTROPHILS NFR BLD AUTO: 53 %
NITRITE UR QL STRIP: NEGATIVE
PH UR STRIP: 6 [PH] (ref 5–7.5)
PLATELET # BLD AUTO: 263 X10E3/UL (ref 150–450)
POTASSIUM SERPL-SCNC: 4.2 MMOL/L (ref 3.5–5.2)
PROT SERPL-MCNC: 7.2 G/DL (ref 6–8.5)
PROT UR QL STRIP: NEGATIVE
RBC # BLD AUTO: 4.25 X10E6/UL (ref 3.77–5.28)
RBC #/AREA URNS HPF: ABNORMAL /HPF (ref 0–2)
SODIUM SERPL-SCNC: 141 MMOL/L (ref 134–144)
SP GR UR: 1.02 (ref 1–1.03)
TRIGL SERPL-MCNC: 94 MG/DL (ref 0–149)
UROBILINOGEN UR STRIP-MCNC: 0.2 MG/DL (ref 0.2–1)
VIT B12 SERPL-MCNC: 701 PG/ML (ref 232–1245)
VLDLC SERPL CALC-MCNC: 19 MG/DL (ref 5–40)
WBC # BLD AUTO: 6.1 X10E3/UL (ref 3.4–10.8)
WBC #/AREA URNS HPF: >30 /HPF (ref 0–5)

## 2020-06-29 ENCOUNTER — OFFICE VISIT (OUTPATIENT)
Dept: FAMILY MEDICINE CLINIC | Facility: CLINIC | Age: 65
End: 2020-06-29

## 2020-06-29 VITALS
HEIGHT: 63 IN | BODY MASS INDEX: 26.75 KG/M2 | OXYGEN SATURATION: 98 % | HEART RATE: 88 BPM | WEIGHT: 151 LBS | DIASTOLIC BLOOD PRESSURE: 82 MMHG | SYSTOLIC BLOOD PRESSURE: 142 MMHG

## 2020-06-29 DIAGNOSIS — Z00.00 PREVENTATIVE HEALTH CARE: ICD-10-CM

## 2020-06-29 DIAGNOSIS — E53.8 COBALAMIN DEFICIENCY: ICD-10-CM

## 2020-06-29 DIAGNOSIS — Z00.00 MEDICARE ANNUAL WELLNESS VISIT, SUBSEQUENT: Primary | ICD-10-CM

## 2020-06-29 DIAGNOSIS — G89.29 CHRONIC BILATERAL THORACIC BACK PAIN: ICD-10-CM

## 2020-06-29 DIAGNOSIS — M54.6 CHRONIC BILATERAL THORACIC BACK PAIN: ICD-10-CM

## 2020-06-29 DIAGNOSIS — I10 ESSENTIAL HYPERTENSION: ICD-10-CM

## 2020-06-29 DIAGNOSIS — E55.9 VITAMIN D DEFICIENCY: ICD-10-CM

## 2020-06-29 PROCEDURE — G0444 DEPRESSION SCREEN ANNUAL: HCPCS | Performed by: FAMILY MEDICINE

## 2020-06-29 PROCEDURE — 99397 PER PM REEVAL EST PAT 65+ YR: CPT | Performed by: FAMILY MEDICINE

## 2020-06-29 PROCEDURE — 96160 PT-FOCUSED HLTH RISK ASSMT: CPT | Performed by: FAMILY MEDICINE

## 2020-06-29 PROCEDURE — G0439 PPPS, SUBSEQ VISIT: HCPCS | Performed by: FAMILY MEDICINE

## 2020-06-29 RX ORDER — TRIMETHOPRIM 100 MG/1
100 TABLET ORAL DAILY
COMMUNITY
End: 2020-10-07

## 2020-06-29 RX ORDER — OXAZEPAM 10 MG
CAPSULE ORAL
COMMUNITY
End: 2020-10-07

## 2020-06-29 NOTE — PROGRESS NOTES
QUICK REFERENCE INFORMATION:  The ABCs of the Annual Wellness Visit    Medicare Subsequent Wellness Visit    Chief Complaint   Patient presents with   • Medicare Wellness-subsequent        HPI     Etelvina Prescott is a 65 y.o. female presenting for subsequent annual wellness visit. She is now taking a daily antibiotic for suppression. She is not doing pelvic floor exercises at this time. Her mid-back pain has improved over the last few weeks. She has started to socializing.  She sees [facility] for some depression and is currently on sertraline 25 mg.  She saw them last week via telehealth and they discussed possibly increasing her dose of Zoloft.    This patient is accompanied by their self who contributes to the history of their care.    Past Medical History:   Diagnosis Date   • Allergic rhinitis    • Breast cancer (CMS/[facility]) 2010    Right -Taxotere and Cytoxan ×4.  Tamoxifen.  Mastectomy   • Chronic anxiety Adulthood    Good response to citalopram   • Chronic constipation Adulthood    MOM or MiraLAX   • Chronic fatigue    • Depression     Good response to amitriptyline and citalopram   • Diverticulosis     Per colonoscopy   • Dry eye syndrome     Moisturizers   • Herpes zoster    • History of exposure to tuberculosis Remote    PPDs  onward all negative   • Hypertension    • Impacted cerumen    • Irritable bowel syndrome    • MVP (mitral valve prolapse)     Recurring tachycardias   • Nephrolithiasis    • Plantar fasciitis    • Sarcoidosis     Active in eyes and lungs   • Sleep disturbances     Good response to amitriptyline and trazodone   • Uterine bleeding     Uterine ablation for bleeding and chronic iron deficiency      Past Surgical History:   Procedure Laterality Date   • BREAST BIOPSY Right     Benign   •  SECTION   &    • COLONOSCOPY  ,     Diverticulosis only.   • COLONOSCOPY W/ POLYPECTOMY  Remote   • DILATATION AND  CURETTAGE     • HYSTEROSCOPY ENDOMETRIAL ABLATION      persistant bleeding   • MASTECTOMY Bilateral 2010    bilateral with implants, breast cancer on the right     Family History   Problem Relation Age of Onset   • Alzheimer's disease Mother          age 89   • Hypothyroidism Mother    • Osteoporosis Mother    • Pulmonary embolism Mother    • Coronary artery disease Father    • Dementia Father    • Diabetes Father    • Hypertension Father    • Heart attack Father    • Hypothyroidism Sister    • Hypertension Sister    • Breast cancer Maternal Grandmother    • Stomach cancer Paternal Aunt    • Lung cancer Paternal Aunt       Social History     Socioeconomic History   • Marital status:      Spouse name: Not on file   • Number of children: Not on file   • Years of education: Not on file   • Highest education level: Not on file   Tobacco Use   • Smoking status: Never Smoker   • Smokeless tobacco: Never Used   Substance and Sexual Activity   • Alcohol use: Yes     Alcohol/week: 4.0 standard drinks     Types: 4 Glasses of wine per week   • Drug use: No   • Sexual activity: Never     Partners: Male   Social History Narrative    Domestic life   lives in private home with , who has advanced MS with paraplegia.        Orthodox    Catholic        Sleep hygiene    in bed midnight to 8 AM for 7 or 8 hours of sleep        Caffeine use    2 cups of coffee daily        Exercise habits    walks 30 minutes 4 days weekly.  Upper body strengthening 3 days weekly        Diet   well-balanced low salt diet        Occupation   lifelong .  Currently working part time.        Hearing : No impairment        Vision : Corrects with trifocal glasses        Driving : No limitations      Allergies   Allergen Reactions   • Penicillins Rash   • Sulfa Antibiotics Rash      Outpatient Medications Prior to Visit   Medication Sig Dispense Refill   • cycloSPORINE (RESTASIS) 0.05 % ophthalmic emulsion Apply 1  drop to eye Every 12 (Twelve) Hours.     • diclofenac (VOLTAREN) 1 % gel gel Apply 4 g topically to the appropriate area as directed 4 (Four) Times a Day As Needed (hip pain). 100 g 1   • enalapril (VASOTEC) 20 MG tablet TAKE 1/2 TABLET TWICE DAILY 90 tablet 1   • fexofenadine (ALLEGRA) 180 MG tablet Take 1 tablet by mouth Daily As Needed.     • fluticasone (FLONASE) 50 MCG/ACT nasal spray 2 sprays into each nostril daily. 3 each 3   • ibuprofen (ADVIL,MOTRIN) 400 MG tablet Take 1 tablet by mouth 2 (Two) Times a Day As Needed for Mild Pain . 180 tablet 1   • Multiple Vitamin (MULTIVITAMIN) capsule Take  by mouth daily.     • oxazepam (SERAX) 10 MG capsule oxazepam 10 mg capsule     • polyvinyl alcohol-povidone (REFRESH) 1.4-0.6 % ophthalmic solution Apply 1 drop to eye 2 (two) times a day.     • sertraline (ZOLOFT) 25 MG tablet Take 25 mg by mouth Daily.     • tamoxifen (NOLVADEX) 20 MG chemo tablet Take 20 mg by mouth Daily.     • traZODone (DESYREL) 100 MG tablet Take 1.5 tablets by mouth every night at bedtime. 135 tablet 3   • trimethoprim (TRIMPEX) 100 MG tablet Take 100 mg by mouth Daily.       No facility-administered medications prior to visit.        Reviewed use of high risk medication in the elderly: yes  Reviewed for potential of harmful drug interactions in the elderly: yes    The following portions of the patient's history were reviewed and updated as appropriate: allergies, current medications, past family history, past medical history, past social history, past surgical history and problem list.    Review of Systems   Review of Systems -  General ROS: negative for - chills, fever or night sweats  Cardiovascular ROS: no chest pain or dyspnea on exertion  Gastrointestinal ROS: no abdominal pain, change in bowel habits, or black or bloody stools  Genito-Urinary ROS: no dysuria, trouble voiding, or hematuria.    Vitals:    06/29/20 0859   BP: 142/82   Pulse: 88   SpO2: 98%   Weight: 68.5 kg (151 lb)  "  Height: 160 cm (63\")   PainSc: 0-No pain       Objective    Physical Exam   Const: NAD, A&Ox4, Pleasant, Cooperative  Eyes: EOMI, no conjunctivitis  ENT: No nasal discharge present, neck supple  Cardiac: Regular rate and rhythm, no cyanosis  Resp: Respiratory rate within normal limits, no increased work of breathing, no audible wheezing or retractions noted  GI: No distention or ascites  MSK: Motor and sensation grossly intact in bilateral upper extremities  Neurologic: CN II-XII grossly intact  Psych: Appropriate mood and behavior.  HEALTH RISK ASSESSMENT    1955    Recent Hospitalizations:  No hospitalization(s) within the last year..      Current Medical Providers:  Patient Care Team:  Marcio Bojorquez DO as PCP - General (Family Medicine)      Smoking Status:  Social History     Tobacco Use   Smoking Status Never Smoker   Smokeless Tobacco Never Used       Alcohol Consumption:  Social History     Substance and Sexual Activity   Alcohol Use Yes   • Alcohol/week: 4.0 standard drinks   • Types: 4 Glasses of wine per week       Depression Screen:   PHQ-2/PHQ-9 Depression Screening 6/29/2020   Little interest or pleasure in doing things 0   Feeling down, depressed, or hopeless 1   Total Score 1       Health Habits and Functional and Cognitive Screening:  Functional & Cognitive Status 6/29/2020   Do you have difficulty preparing food and eating? No   Do you have difficulty bathing yourself, getting dressed or grooming yourself? No   Do you have difficulty using the toilet? No   Do you have difficulty moving around from place to place? No   Do you have trouble with steps or getting out of a bed or a chair? No   Current Diet Well Balanced Diet   Dental Exam Up to date   Eye Exam Up to date   Exercise (times per week) 2 times per week   Current Exercise Activities Include Walking   Do you need help using the phone?  No   Are you deaf or do you have serious difficulty hearing?  No   Do you need help with " transportation? No   Do you need help shopping? No   Do you need help preparing meals?  No   Do you need help with housework?  No   Do you need help with laundry? No   Do you need help taking your medications? No   Do you need help managing money? No   Do you ever drive or ride in a car without wearing a seat belt? No   Have you felt unusual stress, anger or loneliness in the last month? No   Who do you live with? Spouse   If you need help, do you have trouble finding someone available to you? No   Have you been bothered in the last four weeks by sexual problems? No   Do you have difficulty concentrating, remembering or making decisions? Yes           Does the patient have evidence of cognitive impairment? No    Aspirin use counseling? Does not need ASA (and currently is not on it)      Recent Lab Results:  CMP:  Lab Results   Component Value Date    GLU 92 06/25/2020    BUN 12 06/25/2020    CREATININE 0.67 06/25/2020    EGFRIFNONA 91 06/25/2020    EGFRIFAFRI 105 06/25/2020    BCR 18 06/25/2020     06/25/2020    K 4.2 06/25/2020    CO2 27 06/25/2020    CALCIUM 9.6 06/25/2020    PROTENTOTREF 7.2 06/25/2020    ALBUMIN 4.5 06/25/2020    LABGLOBREF 2.7 06/25/2020    LABIL2 1.7 06/25/2020    BILITOT 0.4 06/25/2020    ALKPHOS 53 06/25/2020    AST 22 06/25/2020    ALT 16 06/25/2020     Lipid Panel:  Lab Results   Component Value Date    CHOL 192 08/14/2017    TRIG 94 06/25/2020    HDL 72 06/25/2020    VLDL 19 06/25/2020     HbA1c:       Visual Acuity:  No exam data present    Age-appropriate Screening Schedule:  Refer to the list below for future screening recommendations based on patient's age, sex and/or medical conditions. Orders for these recommended tests are listed in the plan section. The patient has been provided with a written plan.    Health Maintenance   Topic Date Due   • ZOSTER VACCINE (2 of 3) 04/07/2014   • INFLUENZA VACCINE  08/01/2020   • COLONOSCOPY  08/21/2022   • TDAP/TD VACCINES (2 - Td)  05/30/2023   • MAMMOGRAM  Discontinued          Advance Care Planning:  ACP discussion was held with the patient during this visit. Patient has an advance directive (not in EMR), copy requested.    Identification of Risk Factors:  Risk factors include: Advance Directive Discussion  Depression/Dysphoria  Diabetic Lab Screening   Immunizations Discussed/Encouraged (specific immunizations; Shingrix ).    Compared to one year ago, the patient feels her physical health is the same.  Compared to one year ago, the patient feels her mental health is the same.      Etelvina was seen today for medicare wellness-subsequent.    Diagnoses and all orders for this visit:    Medicare annual wellness visit, subsequent    Essential hypertension  -     Comprehensive Metabolic Panel; Future  -     CBC & Differential; Future  -     Lipid Panel; Future  -     Urinalysis With Microscopic If Indicated (No Culture) - Urine, Clean Catch; Future  -     Vitamin D 25 Hydroxy; Future  -     Vitamin B12; Future    Vitamin D deficiency  -     Comprehensive Metabolic Panel; Future  -     CBC & Differential; Future  -     Lipid Panel; Future  -     Urinalysis With Microscopic If Indicated (No Culture) - Urine, Clean Catch; Future  -     Vitamin D 25 Hydroxy; Future  -     Vitamin B12; Future    Cobalamin deficiency  -     Comprehensive Metabolic Panel; Future  -     CBC & Differential; Future  -     Lipid Panel; Future  -     Urinalysis With Microscopic If Indicated (No Culture) - Urine, Clean Catch; Future  -     Vitamin D 25 Hydroxy; Future  -     Vitamin B12; Future    Preventative health care    Chronic bilateral thoracic back pain        Procedure   Procedures       Patient Self-Management and Personalized Health Advice  The patient has been provided with information about: diet, exercise and supplements and preventive services including:   · Annual Wellness Visit (AWV)  · Depression Screening (15 minutes face to face, Code  ).    Etelvina was seen today for medicare wellness-subsequent.    Diagnoses and all orders for this visit:    Medicare annual wellness visit, subsequent    Essential hypertension  -     Comprehensive Metabolic Panel; Future  -     CBC & Differential; Future  -     Lipid Panel; Future  -     Urinalysis With Microscopic If Indicated (No Culture) - Urine, Clean Catch; Future  -     Vitamin D 25 Hydroxy; Future  -     Vitamin B12; Future    Vitamin D deficiency  -     Comprehensive Metabolic Panel; Future  -     CBC & Differential; Future  -     Lipid Panel; Future  -     Urinalysis With Microscopic If Indicated (No Culture) - Urine, Clean Catch; Future  -     Vitamin D 25 Hydroxy; Future  -     Vitamin B12; Future    Cobalamin deficiency  -     Comprehensive Metabolic Panel; Future  -     CBC & Differential; Future  -     Lipid Panel; Future  -     Urinalysis With Microscopic If Indicated (No Culture) - Urine, Clean Catch; Future  -     Vitamin D 25 Hydroxy; Future  -     Vitamin B12; Future    Preventative health care    Chronic bilateral thoracic back pain        Problem List Items Addressed This Visit        Cardiovascular and Mediastinum    Hypertension    Relevant Orders    Comprehensive Metabolic Panel (Completed)    CBC & Differential (Completed)    Lipid Panel (Completed)    Urinalysis With Microscopic If Indicated (No Culture) - Urine, Clean Catch    Vitamin D 25 Hydroxy (Completed)    Vitamin B12 (Completed)       Digestive    Cobalamin deficiency    Relevant Orders    Comprehensive Metabolic Panel (Completed)    CBC & Differential (Completed)    Lipid Panel (Completed)    Urinalysis With Microscopic If Indicated (No Culture) - Urine, Clean Catch    Vitamin D 25 Hydroxy (Completed)    Vitamin B12 (Completed)    Vitamin D deficiency    Relevant Orders    Comprehensive Metabolic Panel (Completed)    CBC & Differential (Completed)    Lipid Panel (Completed)    Urinalysis With Microscopic If Indicated (No  Culture) - Urine, Clean Catch    Vitamin D 25 Hydroxy (Completed)    Vitamin B12 (Completed)       Other    Preventative health care      Other Visit Diagnoses     Medicare annual wellness visit, subsequent    -  Primary    Chronic bilateral thoracic back pain              Patient Instructions   1.  Increase to 50mg of Zoloft    2.  Do exercises at home for left infraspinatus and trapezius    3.  Start a probiotic such a Kefir or Goodbelly      The wellness exam has been reviewed in detail.  The patient has been fully counseled on preventative guidelines for vaccines, cancer screenings, and other health maintenance needs.  Functional testing has been performed to assess capacity for independent living and need for other medical interventions.  Screening for depression was completed via a validated screening model as indicated above, 15 minutes was spent in total on depression screening.  The patient was counseled on maintaining a lifestyle to promote good health and to minimize chronic diseases, including 15 minutes spent screening for alcohol misuse and counseling on safe and appropriate alcohol intake and overall risk reduction as indicated above.  The patient has been assisted with scheduling healthcare procedures for the coming year and given a written document outlining these recommendations.    Follow Up:  Return in about 6 months (around 12/29/2020).     An After Visit Summary and PPPS with all of these plans were given to the patient.

## 2020-06-29 NOTE — PATIENT INSTRUCTIONS
1.  Increase to 50mg of Zoloft    2.  Do exercises at home for left infraspinatus and trapezius    3.  Start a probiotic such a Kefir or Goodbellnicolasa

## 2020-07-15 NOTE — PROGRESS NOTES
"[facility]  [facility]  Telemedicine note  07/20/2020           Etelvina Prescott  [address] [address]  [unfilled]    1955    Marcio Bojorquez DO    Etelvina Prescott is a 65 y.o. female.      Subjective:     Chief Complaint:  Palpitations and Follow-up    This was an audio and video enabled telemedicine encounter.      HPI   Patient is a 65-year-old female with past medical history significant for hypertension, breast cancer and sarcoidosis who presents today to follow up on palpitations. Patient wore extended Holter monitor in January which showed 3 brief ventricular runs with the longest lasting 4 beats, which appeared to be SVT with aberrancy.  PVC burden was less than 1%.  She had 31 supraventricular runs with the longest lasting 7 beats.  Most triggered events were normal sinus rhythm.  Echo showed normal left ventricular systolic function with no significant valve abnormalities.   She reports the palpitations have mostly resolved. Occasionally will feel a \"thump or missed beat\" at night when trying to fall asleep. Reports SBP usually around 130 or less    Patient Active Problem List   Diagnosis   • Atopic rhinitis   • Impacted cerumen   • Osteoarthritis of cervical spine   • Depression   • Tear film insufficiency   • Headache   • Hypertension   • Irritable bowel syndrome   • Sarcoidosis   • Dyssomnia   • Cobalamin deficiency   • Vitamin D deficiency   • Breast cancer (CMS/HCC)   • Preventative health care   • Mitral regurgitation   • Low back pain   • Left hip pain   • Chronic fatigue   • Lumbar spondylosis   • Atypical glandular cells of undetermined significance (PRITESH) on cervical Pap smear   • Use of tamoxifen (Nolvadex)   • Endometrial hyperplasia   • Greater trochanteric bursitis of left hip   • Gluteal tendinitis of left buttock   • Viral URI with cough       Past Medical History:   Diagnosis Date   • Allergic rhinitis    • Breast cancer (CMS/HCC) " 2010    Right -Taxotere and Cytoxan ×4.  Tamoxifen.  Mastectomy   • Chronic anxiety Adulthood    Good response to citalopram   • Chronic constipation Adulthood    MOM or MiraLAX   • Chronic fatigue    • Depression     Good response to amitriptyline and citalopram   • Diverticulosis 2017    Per colonoscopy   • Dry eye syndrome 2010    Moisturizers   • Herpes zoster    • History of exposure to tuberculosis Remote    PPDs  onward all negative   • Hypertension    • Impacted cerumen    • Irritable bowel syndrome    • MVP (mitral valve prolapse)     Recurring tachycardias   • Nephrolithiasis    • Plantar fasciitis    • Sarcoidosis     Active in eyes and lungs   • Sleep disturbances     Good response to amitriptyline and trazodone   • Uterine bleeding 2006    Uterine ablation for bleeding and chronic iron deficiency       Past Surgical History:   Procedure Laterality Date   • BREAST BIOPSY Right     Benign   •  SECTION   &    • COLONOSCOPY  ,     Diverticulosis only.   • COLONOSCOPY W/ POLYPECTOMY  Remote   • DILATATION AND CURETTAGE     • HYSTEROSCOPY ENDOMETRIAL ABLATION  2006    persistant bleeding   • MASTECTOMY Bilateral 2010    bilateral with implants, breast cancer on the right       Family History   Problem Relation Age of Onset   • Alzheimer's disease Mother          age 89   • Hypothyroidism Mother    • Osteoporosis Mother    • Pulmonary embolism Mother    • Coronary artery disease Father    • Dementia Father    • Diabetes Father    • Hypertension Father    • Heart attack Father    • Hypothyroidism Sister    • Hypertension Sister    • Breast cancer Maternal Grandmother    • Stomach cancer Paternal Aunt    • Lung cancer Paternal Aunt        Social History     Socioeconomic History   • Marital status:      Spouse name: Not on file   • Number of children: Not on file   • Years of education: Not on file   • Highest education level: Not on file    Tobacco Use   • Smoking status: Never Smoker   • Smokeless tobacco: Never Used   Substance and Sexual Activity   • Alcohol use: Yes     Alcohol/week: 4.0 standard drinks     Types: 4 Glasses of wine per week   • Drug use: No   • Sexual activity: Never     Partners: Male   Social History Narrative    Domestic life   lives in private home with , who has advanced MS with paraplegia.        Hinduism    Scientology        Sleep hygiene    in bed midnight to 8 AM for 7 or 8 hours of sleep        Caffeine use    2 cups of coffee daily        Exercise habits    walks 30 minutes 4 days weekly.  Upper body strengthening 3 days weekly        Diet   well-balanced low salt diet        Occupation   lifelong .  Currently working part time.        Hearing : No impairment        Vision : Corrects with trifocal glasses        Driving : No limitations       Allergies   Allergen Reactions   • Penicillins Rash   • Sulfa Antibiotics Rash         Current Outpatient Medications:   •  cycloSPORINE (RESTASIS) 0.05 % ophthalmic emulsion, Apply 1 drop to eye Every 12 (Twelve) Hours., Disp: , Rfl:   •  diclofenac (VOLTAREN) 1 % gel gel, Apply 4 g topically to the appropriate area as directed 4 (Four) Times a Day As Needed (hip pain)., Disp: 100 g, Rfl: 1  •  enalapril (VASOTEC) 20 MG tablet, TAKE 1/2 TABLET TWICE DAILY, Disp: 90 tablet, Rfl: 1  •  fexofenadine (ALLEGRA) 180 MG tablet, Take 1 tablet by mouth Daily As Needed., Disp: , Rfl:   •  ibuprofen (ADVIL,MOTRIN) 400 MG tablet, Take 1 tablet by mouth 2 (Two) Times a Day As Needed for Mild Pain ., Disp: 180 tablet, Rfl: 1  •  Multiple Vitamin (MULTIVITAMIN) capsule, Take  by mouth daily., Disp: , Rfl:   •  polyvinyl alcohol-povidone (REFRESH) 1.4-0.6 % ophthalmic solution, Apply 1 drop to eye 2 (two) times a day., Disp: , Rfl:   •  sertraline (ZOLOFT) 25 MG tablet, Take 25 mg by mouth Daily., Disp: , Rfl:   •  tamoxifen (NOLVADEX) 20 MG chemo tablet, Take 20 mg by  mouth Daily., Disp: , Rfl:   •  traZODone (DESYREL) 100 MG tablet, Take 1.5 tablets by mouth every night at bedtime., Disp: 135 tablet, Rfl: 3  •  fluticasone (FLONASE) 50 MCG/ACT nasal spray, 2 sprays into each nostril daily., Disp: 3 each, Rfl: 3  •  oxazepam (SERAX) 10 MG capsule, oxazepam 10 mg capsule, Disp: , Rfl:   •  trimethoprim (TRIMPEX) 100 MG tablet, Take 100 mg by mouth Daily., Disp: , Rfl:     The following portions of the patient's history were reviewed today and updated as appropriate: allergies, current medications, past family history, past medical history, past social history, past surgical history and problem list     Review of Systems   Constitution: Negative for chills and fever.   HENT: Negative.    Eyes: Negative.    Cardiovascular: Positive for irregular heartbeat and palpitations. Negative for chest pain, claudication, cyanosis, dyspnea on exertion, leg swelling, near-syncope, orthopnea, paroxysmal nocturnal dyspnea and syncope.   Respiratory: Negative for cough, shortness of breath and snoring.    Endocrine: Negative.    Hematologic/Lymphatic: Does not bruise/bleed easily.   Skin: Negative for poor wound healing.   Musculoskeletal: Negative.    Gastrointestinal: Negative for abdominal pain, heartburn, hematemesis, melena, nausea and vomiting.   Genitourinary: Negative.  Negative for hematuria.   Neurological: Negative.    Psychiatric/Behavioral: Positive for depression. The patient is nervous/anxious.    Allergic/Immunologic: Negative.        Objective:     Vitals:    07/20/20 0830   BP: 142/82   Pulse: 88   SpO2: 98%   Weight: 59.4 kg (131 lb)       Body mass index is 23.21 kg/m².    Physical Exam   Constitutional: She is oriented to person, place, and time. She appears well-developed and well-nourished. No distress.   HENT:   Head: Normocephalic.   Eyes: Pupils are equal, round, and reactive to light. Conjunctivae are normal.   Neck: Neck supple. No JVD present. No thyromegaly present.    Cardiovascular: Normal rate, regular rhythm and intact distal pulses. Exam reveals no gallop and no friction rub.   No murmur heard.  Pulmonary/Chest: Effort normal and breath sounds normal. No respiratory distress. She has no wheezes. She has no rales. She exhibits no tenderness.   Abdominal: Soft. Bowel sounds are normal.   Musculoskeletal: Normal range of motion. She exhibits no edema.   Neurological: She is alert and oriented to person, place, and time.   Skin: Skin is warm and dry.   Psychiatric: She has a normal mood and affect. Her behavior is normal. Thought content normal.   Vitals reviewed.      Lab and Diagnostic Review:  Echo 1/20/20  · Estimated EF = 74%.  · Left ventricular systolic function is hyperdynamic (EF > 70).  · Left ventricular diastolic function is normal.  · Normal right ventricular cavity size, wall thickness, systolic function and septal motion noted.  · The aortic valve exhibits mild sclerosis.  · No evidence of pulmonary hypertension is present.  · There is no evidence of pericardial effusion.    Extended Holter monitor worn for close to 7 days showed an average heart rate of 83, minimum heart of 59 and a max rate of 124 bpm.  There are 3 ventricular runs the longest lasting 4 beats, which could have been SVT with aberrancy.  PVC burden less than 1%.  31 supraventricular runs with the longest lasting 7 beats.  PAC burden was less than 1%.    Assessment and Plan:   1. Palpitations  Brief supraventricular runs.  Possible brief ventricular runs but looked more like SVT with aberrancy (3 beats). Most triggered events were NSR and rare PVC. She was on antidepressants at the time, which seemed to trigger palpitations. Symptoms have since resolved    2. Hypertension  Usually controlled. Advised to start monitoring regularly at home. Goal /80  Continue enalapril     RV PRN    This visit has been scheduled as a video visit to comply with patient safety concerns in accordance with CDC  recommendations. Total time of discussion was 10 minutes.          It has been a pleasure to participate in the care of this patient.  Patient was instructed to call the [facility] with any questions, concerns, or worsening symptoms.    *Please note that portions of this note were completed with a voice recognition program. Efforts were made to edit the dictations, but occasionally words are mistranscribed.

## 2020-07-20 ENCOUNTER — TELEMEDICINE (OUTPATIENT)
Dept: CARDIOLOGY | Facility: HOSPITAL | Age: 65
End: 2020-07-20

## 2020-07-20 VITALS
WEIGHT: 131 LBS | BODY MASS INDEX: 23.21 KG/M2 | SYSTOLIC BLOOD PRESSURE: 142 MMHG | HEART RATE: 88 BPM | DIASTOLIC BLOOD PRESSURE: 82 MMHG | OXYGEN SATURATION: 98 %

## 2020-07-20 DIAGNOSIS — R00.2 PALPITATIONS: Primary | ICD-10-CM

## 2020-07-20 DIAGNOSIS — I10 ESSENTIAL HYPERTENSION: ICD-10-CM

## 2020-07-20 PROCEDURE — 99212 OFFICE O/P EST SF 10 MIN: CPT | Performed by: NURSE PRACTITIONER

## 2020-08-03 ENCOUNTER — TELEPHONE (OUTPATIENT)
Dept: FAMILY MEDICINE CLINIC | Facility: CLINIC | Age: 65
End: 2020-08-03

## 2020-08-03 NOTE — TELEPHONE ENCOUNTER
She would need to be seen to properly evaluate, urgent care would be an option for right now. In the meantime I would recommend hydrocortisone and benadryl cream and cool water soaks.

## 2020-08-03 NOTE — TELEPHONE ENCOUNTER
Patient states that she has had rash with blisters in her groin area and on her legs.  To her knowledge, she does not know what could have caused this.  What does she need to do?  She read that this could be a COVID symptom.  She does not have any other symptoms, but maybe a headache, but she took Tylenol and it went away.  She can be reached at [phone]

## 2020-09-16 ENCOUNTER — OFFICE VISIT (OUTPATIENT)
Dept: FAMILY MEDICINE CLINIC | Facility: CLINIC | Age: 65
End: 2020-09-16

## 2020-09-16 VITALS
HEART RATE: 86 BPM | DIASTOLIC BLOOD PRESSURE: 80 MMHG | HEIGHT: 63 IN | SYSTOLIC BLOOD PRESSURE: 162 MMHG | OXYGEN SATURATION: 91 % | BODY MASS INDEX: 23.57 KG/M2 | WEIGHT: 133 LBS

## 2020-09-16 DIAGNOSIS — G89.29 CHRONIC MIDLINE THORACIC BACK PAIN: ICD-10-CM

## 2020-09-16 DIAGNOSIS — K58.9 IRRITABLE BOWEL SYNDROME WITHOUT DIARRHEA: ICD-10-CM

## 2020-09-16 DIAGNOSIS — I10 ESSENTIAL HYPERTENSION: Primary | ICD-10-CM

## 2020-09-16 DIAGNOSIS — R09.89 ABDOMINAL BRUIT: ICD-10-CM

## 2020-09-16 DIAGNOSIS — R10.9 ABDOMINAL PAIN, UNSPECIFIED ABDOMINAL LOCATION: ICD-10-CM

## 2020-09-16 DIAGNOSIS — M54.6 CHRONIC MIDLINE THORACIC BACK PAIN: ICD-10-CM

## 2020-09-16 PROCEDURE — 99214 OFFICE O/P EST MOD 30 MIN: CPT | Performed by: PHYSICIAN ASSISTANT

## 2020-09-16 RX ORDER — DICYCLOMINE HCL 20 MG
20 TABLET ORAL EVERY 6 HOURS
Qty: 30 TABLET | Refills: 1 | Status: SHIPPED | OUTPATIENT
Start: 2020-09-16 | End: 2020-09-28

## 2020-09-16 RX ORDER — FAMOTIDINE 20 MG/1
20 TABLET, FILM COATED ORAL 2 TIMES DAILY PRN
Qty: 60 TABLET | Refills: 1 | Status: SHIPPED | OUTPATIENT
Start: 2020-09-16 | End: 2020-11-17

## 2020-09-16 NOTE — PROGRESS NOTES
"    Chief Complaint   Patient presents with   • Adominal pain     Saw urologist on Monday and they are waiting on culture results.   • Back Pain     Between shoulder blades.       HPI     Etelvina Prescott is a pleasant 65 y.o. female with IBS and chronic back pain who presents for evaluation of \"chief complaint.\"     She c/o lower abdominal cramping, epigastric \"bubbling\" feeling, not feeling good for the past 1 month. Wonders if it may be her anxiety. BM every other day, soft stools. Constipation in past, not currently. Burps a lot, tried Tums with perhaps minor improvement.  has been in hospital, has MS. She sees a psychiatrist at [facility]. Celexa was changed to zoloft around 9 months ago. She has a follow-up video appointment tomorrow.      She reports ongoing dull pain between both shoulder blades, chronic for years intermittent, some days better than others, unsure of triggeres. She does help her  get out of bed at times and lifts him. Plays with grandchildren frequently. She has not been good about doing home exercises Dr. Bojorquez gave her at her visit in June. Has been taking ibuprofen with some benefit, does not want to take it daily however. Oxazepam helps.    HTN: Has cuff at home but has not checked her readings recently. She takes 1/2 pill enalapril bid.     Past Medical History:   Diagnosis Date   • Allergic rhinitis    • Breast cancer (CMS/HCC) 2010    Right -Taxotere and Cytoxan ×4.  Tamoxifen.  Mastectomy   • Chronic anxiety Adulthood    Good response to citalopram   • Chronic constipation Adulthood    MOM or MiraLAX   • Chronic fatigue    • Depression 1990    Good response to amitriptyline and citalopram   • Diverticulosis 2017    Per colonoscopy   • Dry eye syndrome 2010    Moisturizers   • Herpes zoster    • History of exposure to tuberculosis Remote    PPDs 1993 onward all negative   • Hypertension 2006   • Impacted cerumen    • Irritable bowel syndrome    • MVP " (mitral valve prolapse)     Recurring tachycardias   • Nephrolithiasis    • Plantar fasciitis    • Sarcoidosis     Active in eyes and lungs   • Sleep disturbances     Good response to amitriptyline and trazodone   • Uterine bleeding 2006    Uterine ablation for bleeding and chronic iron deficiency       Past Surgical History:   Procedure Laterality Date   • BREAST BIOPSY Right 1988    Benign   •  SECTION   &    • COLONOSCOPY  ,     Diverticulosis only.   • COLONOSCOPY W/ POLYPECTOMY  Remote   • DILATATION AND CURETTAGE     • HYSTEROSCOPY ENDOMETRIAL ABLATION  2006    persistant bleeding   • MASTECTOMY Bilateral 2010    bilateral with implants, breast cancer on the right       Family History   Problem Relation Age of Onset   • Alzheimer's disease Mother          age 89   • Hypothyroidism Mother    • Osteoporosis Mother    • Pulmonary embolism Mother    • Coronary artery disease Father    • Dementia Father    • Diabetes Father    • Hypertension Father    • Heart attack Father    • Hypothyroidism Sister    • Hypertension Sister    • Breast cancer Maternal Grandmother    • Stomach cancer Paternal Aunt    • Lung cancer Paternal Aunt        Social History     Socioeconomic History   • Marital status:      Spouse name: Not on file   • Number of children: Not on file   • Years of education: Not on file   • Highest education level: Not on file   Tobacco Use   • Smoking status: Never Smoker   • Smokeless tobacco: Never Used   Substance and Sexual Activity   • Alcohol use: Yes     Alcohol/week: 4.0 standard drinks     Types: 4 Glasses of wine per week   • Drug use: No   • Sexual activity: Never     Partners: Male   Social History Narrative    Domestic life   lives in private home with , who has advanced MS with paraplegia.        Hindu    Mandaen        Sleep hygiene    in bed midnight to 8 AM for 7 or 8 hours of sleep        Caffeine use    2 cups of coffee  daily        Exercise habits    walks 30 minutes 4 days weekly.  Upper body strengthening 3 days weekly        Diet   well-balanced low salt diet        Occupation   lifelong .  Currently working part time.        Hearing : No impairment        Vision : Corrects with trifocal glasses        Driving : No limitations       Allergies   Allergen Reactions   • Penicillins Rash   • Sulfa Antibiotics Rash       ROS    Review of Systems   Constitutional: Negative for chills and fever.   Gastrointestinal: Positive for abdominal pain and GERD. Negative for blood in stool, constipation, diarrhea, nausea and vomiting.   Musculoskeletal: Positive for arthralgias, back pain and myalgias.   Psychiatric/Behavioral: Positive for depressed mood and stress. Negative for suicidal ideas. The patient is nervous/anxious.        Vitals:    09/16/20 0936   BP: 162/80   Pulse: 86   SpO2: 91%     Body mass index is 23.57 kg/m².      Current Outpatient Medications:   •  cycloSPORINE (RESTASIS) 0.05 % ophthalmic emulsion, Apply 1 drop to eye Every 12 (Twelve) Hours., Disp: , Rfl:   •  diclofenac (VOLTAREN) 1 % gel gel, Apply 4 g topically to the appropriate area as directed 4 (Four) Times a Day As Needed (hip pain)., Disp: 100 g, Rfl: 1  •  enalapril (VASOTEC) 20 MG tablet, TAKE 1/2 TABLET TWICE DAILY, Disp: 90 tablet, Rfl: 1  •  fexofenadine (ALLEGRA) 180 MG tablet, Take 1 tablet by mouth Daily As Needed., Disp: , Rfl:   •  ibuprofen (ADVIL,MOTRIN) 400 MG tablet, Take 1 tablet by mouth 2 (Two) Times a Day As Needed for Mild Pain ., Disp: 180 tablet, Rfl: 1  •  Multiple Vitamin (MULTIVITAMIN) capsule, Take  by mouth daily., Disp: , Rfl:   •  polyvinyl alcohol-povidone (REFRESH) 1.4-0.6 % ophthalmic solution, Apply 1 drop to eye 2 (two) times a day., Disp: , Rfl:   •  sertraline (ZOLOFT) 25 MG tablet, Take 25 mg by mouth Daily., Disp: , Rfl:   •  sertraline (ZOLOFT) 50 MG tablet, , Disp: , Rfl:   •  traZODone (DESYREL) 100 MG  tablet, Take 1.5 tablets by mouth every night at bedtime., Disp: 135 tablet, Rfl: 3  •  dicyclomine (Bentyl) 20 MG tablet, Take 1 tablet by mouth Every 6 (Six) Hours., Disp: 30 tablet, Rfl: 1  •  famotidine (PEPCID) 20 MG tablet, Take 1 tablet by mouth 2 (Two) Times a Day As Needed for Heartburn., Disp: 60 tablet, Rfl: 1  •  fluticasone (FLONASE) 50 MCG/ACT nasal spray, 2 sprays into each nostril daily., Disp: 3 each, Rfl: 3  •  oxazepam (SERAX) 10 MG capsule, oxazepam 10 mg capsule, Disp: , Rfl:   •  tamoxifen (NOLVADEX) 20 MG chemo tablet, Take 20 mg by mouth Daily., Disp: , Rfl:   •  trimethoprim (TRIMPEX) 100 MG tablet, Take 100 mg by mouth Daily., Disp: , Rfl:     PE    Physical Exam  Vitals signs reviewed.   Constitutional:       General: She is not in acute distress.     Appearance: She is well-developed.   HENT:      Head: Normocephalic and atraumatic.   Eyes:      Conjunctiva/sclera: Conjunctivae normal.   Neck:      Musculoskeletal: Normal range of motion.   Cardiovascular:      Rate and Rhythm: Normal rate and regular rhythm.      Heart sounds: Normal heart sounds. No murmur.   Pulmonary:      Effort: Pulmonary effort is normal.      Breath sounds: Normal breath sounds.   Abdominal:      General: Bowel sounds are normal.      Palpations: Abdomen is soft.      Tenderness: There is no abdominal tenderness.      Comments: Abdominal bruits auscultated bilateral upper abdomen    Skin:     General: Skin is warm and dry.   Neurological:      Mental Status: She is alert.      Gait: Gait normal.   Psychiatric:         Mood and Affect: Mood is anxious.         Speech: Speech normal.         Behavior: Behavior normal.          A/P    Problem List Items Addressed This Visit        Cardiovascular and Mediastinum    Hypertension - Primary  - systolic on repeat  -Counseled patient to monitor her blood pressure at home and report any persistent readings greater than 140/90  -Return to clinic in 3 weeks for  follow-up with Dr. Bojorquez    Relevant Medications    enalapril (VASOTEC) 20 MG tablet       Digestive    Irritable bowel syndrome      Other Visit Diagnoses     Chronic midline thoracic back pain      -Refer to physical therapy    Relevant Orders    Ambulatory Referral to Physical Therapy Evaluate and treat    Abdominal pain, unspecified abdominal location     -Trial bentyl, pepcid prn      Abdominal bruit      -Check renal arteries and aorta    Relevant Orders    US Aorta Limited    Duplex Renal Artery - Bilateral Complete CAR          Plan of care was reviewed with patient at the conclusion of today's visit. Education was provided regarding diagnoses, management, prescribed or recommended OTC products, and the importance of compliance with follow-up appointments. The patient was counseled regarding the risks, benefits, and possible side-effects of treatment. I advised the patient to keep me informed of any acute changes in their status including new, worsening, or persistent symptoms. Patient expresses understanding and agreement with the management plan.        JOE Swain

## 2020-09-28 RX ORDER — DICYCLOMINE HCL 20 MG
TABLET ORAL
Qty: 30 TABLET | Refills: 0 | Status: SHIPPED | OUTPATIENT
Start: 2020-09-28 | End: 2020-10-02

## 2020-10-02 RX ORDER — DICYCLOMINE HCL 20 MG
TABLET ORAL
Qty: 30 TABLET | Refills: 0 | Status: SHIPPED | OUTPATIENT
Start: 2020-10-02 | End: 2020-10-07 | Stop reason: SDUPTHER

## 2020-10-07 ENCOUNTER — OFFICE VISIT (OUTPATIENT)
Dept: FAMILY MEDICINE CLINIC | Facility: CLINIC | Age: 65
End: 2020-10-07

## 2020-10-07 VITALS
DIASTOLIC BLOOD PRESSURE: 78 MMHG | WEIGHT: 132 LBS | SYSTOLIC BLOOD PRESSURE: 134 MMHG | BODY MASS INDEX: 22.53 KG/M2 | HEIGHT: 64 IN

## 2020-10-07 DIAGNOSIS — I10 ESSENTIAL HYPERTENSION: Primary | ICD-10-CM

## 2020-10-07 DIAGNOSIS — K58.9 IRRITABLE BOWEL SYNDROME WITHOUT DIARRHEA: ICD-10-CM

## 2020-10-07 DIAGNOSIS — R09.89 ABDOMINAL BRUIT: ICD-10-CM

## 2020-10-07 PROCEDURE — 99214 OFFICE O/P EST MOD 30 MIN: CPT | Performed by: FAMILY MEDICINE

## 2020-10-07 PROCEDURE — 90694 VACC AIIV4 NO PRSRV 0.5ML IM: CPT | Performed by: FAMILY MEDICINE

## 2020-10-07 PROCEDURE — G0008 ADMIN INFLUENZA VIRUS VAC: HCPCS | Performed by: FAMILY MEDICINE

## 2020-10-07 RX ORDER — DICYCLOMINE HCL 20 MG
20 TABLET ORAL EVERY 6 HOURS
Qty: 120 TABLET | Refills: 11 | Status: SHIPPED | OUTPATIENT
Start: 2020-10-07

## 2020-10-07 NOTE — PROGRESS NOTES
Subjective   Etelivna Prescott is a 65 y.o. female.     Chief Complaint   Patient presents with   • Follow-up       History of Present Illness     Etelvina Prescott presents today for   Chief Complaint   Patient presents with   • Follow-up     She is here today to follow-up on her blood pressure.  She saw J[facility] on 9/16/2020.  An abdominal bruit was appreciated and she has renal abdominal ultrasound scheduled in 2 weeks. GI symptoms have improved over the last week since her psych upper her Zoloft. She is working with PT on her back.    This patient is accompanied by their self who contributes to the history of their care.    The following portions of the patient's history were reviewed and updated as appropriate: allergies, current medications, past family history, past medical history, past social history, past surgical history and problem list.    Active Ambulatory Problems     Diagnosis Date Noted   • Atopic rhinitis 06/06/2016   • Impacted cerumen 06/06/2016   • Osteoarthritis of cervical spine 06/06/2016   • Depression 06/06/2016   • Tear film insufficiency 06/06/2016   • Headache 06/06/2016   • Hypertension 06/06/2016   • Irritable bowel syndrome 06/06/2016   • Sarcoidosis 06/06/2016   • Dyssomnia 06/06/2016   • Cobalamin deficiency 06/06/2016   • Vitamin D deficiency 06/06/2016   • Breast cancer (CMS/[facility])    • Preventative health care 06/07/2016   • Mitral regurgitation 08/12/2016   • Low back pain 08/14/2017   • Left hip pain 08/14/2017   • Chronic fatigue 08/14/2017   • Lumbar spondylosis 12/08/2017   • Atypical glandular cells of undetermined significance (PRITESH) on cervical Pap smear 08/29/2018   • Use of tamoxifen (Nolvadex) 09/01/2018   • Endometrial hyperplasia 09/01/2018   • Greater trochanteric bursitis of left hip 11/26/2018   • Gluteal tendinitis of left buttock 01/21/2019   • Viral URI with cough 02/09/2019     Resolved Ambulatory Problems     Diagnosis Date Noted   • Right lower quadrant  abdominal pain 2016   • Malignant neoplasm of breast (CMS/HCC) 2016   • Dysuria 2016   • Herpes zoster 2016   • Viral URI 2017   • Viral bronchitis 2017     Past Medical History:   Diagnosis Date   • Allergic rhinitis    • Chronic anxiety Adulthood   • Chronic constipation Adulthood   • Diverticulosis    • Dry eye syndrome    • History of exposure to tuberculosis Remote   • MVP (mitral valve prolapse)    • Nephrolithiasis    • Plantar fasciitis    • Sleep disturbances    • Uterine bleeding      Past Surgical History:   Procedure Laterality Date   • BREAST BIOPSY Right     Benign   •  SECTION   &    • COLONOSCOPY  ,     Diverticulosis only.   • COLONOSCOPY W/ POLYPECTOMY  Remote   • DILATATION AND CURETTAGE     • HYSTEROSCOPY ENDOMETRIAL ABLATION      persistant bleeding   • MASTECTOMY Bilateral     bilateral with implants, breast cancer on the right     Family History   Problem Relation Age of Onset   • Alzheimer's disease Mother          age 89   • Hypothyroidism Mother    • Osteoporosis Mother    • Pulmonary embolism Mother    • Coronary artery disease Father    • Dementia Father    • Diabetes Father    • Hypertension Father    • Heart attack Father    • Hypothyroidism Sister    • Hypertension Sister    • Breast cancer Maternal Grandmother    • Stomach cancer Paternal Aunt    • Lung cancer Paternal Aunt      Social History     Socioeconomic History   • Marital status:      Spouse name: Not on file   • Number of children: Not on file   • Years of education: Not on file   • Highest education level: Not on file   Tobacco Use   • Smoking status: Never Smoker   • Smokeless tobacco: Never Used   Substance and Sexual Activity   • Alcohol use: Yes     Alcohol/week: 4.0 standard drinks     Types: 4 Glasses of wine per week   • Drug use: No   • Sexual activity: Never     Partners: Male   Social History Narrative     "Domestic life   lives in private home with , who has advanced MS with paraplegia.        Jain    Judaism        Sleep hygiene    in bed midnight to 8 AM for 7 or 8 hours of sleep        Caffeine use    2 cups of coffee daily        Exercise habits    walks 30 minutes 4 days weekly.  Upper body strengthening 3 days weekly        Diet   well-balanced low salt diet        Occupation   lifelong .  Currently working part time.        Hearing : No impairment        Vision : Corrects with trifocal glasses        Driving : No limitations       Review of Systems  Review of Systems -  General ROS: negative for - chills, fever or night sweats  Cardiovascular ROS: no chest pain or dyspnea on exertion  Gastrointestinal ROS: mild GI discomfort, upset stomach  Genito-Urinary ROS: no dysuria, trouble voiding, or hematuria    Objective   Blood pressure 134/78, height 162.6 cm (64\"), weight 59.9 kg (132 lb), not currently breastfeeding.  Nursing note reviewed  Physical Exam  Const: NAD, A&Ox4, Pleasant, Cooperative  Eyes: EOMI, no conjunctivitis  ENT: No nasal discharge present, neck supple  Cardiac: Regular rate and rhythm, no cyanosis  Resp: Respiratory rate within normal limits, no increased work of breathing, no audible wheezing or retractions noted  GI: No distention or ascites. BS hyperactive. Central epigastric abd bruit noted  Procedures  Assessment/Plan   Problem List Items Addressed This Visit        Cardiovascular and Mediastinum    Hypertension - Primary    Overview     Enalapril 20 mg daily            Digestive    Irritable bowel syndrome    Relevant Medications    dicyclomine (BENTYL) 20 MG tablet      Other Visit Diagnoses     Abdominal bruit              See patient diagnoses and orders along with patient instructions for assessment, plan, and changes to care for patient.    Patient Instructions   1.  Continue increased dose of Zoloft    2.  See IBS Diet    3.  Continue Bentyl through the " day    4.  Have scans done and message me when complete and with status      No follow-ups on file.    Ambulatory progress note signed and attested to by Marcio Bojorquez D.O.

## 2020-10-07 NOTE — PATIENT INSTRUCTIONS
1.  Continue increased dose of Zoloft    2.  See IBS Diet    3.  Continue Bentyl through the day    4.  Have scans done and message me when complete and with status

## 2020-10-26 ENCOUNTER — HOSPITAL ENCOUNTER (OUTPATIENT)
Dept: ULTRASOUND IMAGING | Facility: HOSPITAL | Age: 65
Discharge: HOME | End: 2020-10-26

## 2020-10-26 ENCOUNTER — HOSPITAL ENCOUNTER (OUTPATIENT)
Dept: CARDIOLOGY | Facility: HOSPITAL | Age: 65
Discharge: HOME | End: 2020-10-26

## 2020-10-26 VITALS — BODY MASS INDEX: 23.39 KG/M2 | WEIGHT: 132 LBS | HEIGHT: 63 IN

## 2020-10-26 DIAGNOSIS — R09.89 ABDOMINAL BRUIT: ICD-10-CM

## 2020-10-26 LAB
BH CV ECHO MEAS - BSA(HAYCOCK): 1.6 M^2
BH CV ECHO MEAS - BSA: 1.6 M^2
BH CV ECHO MEAS - BZI_BMI: 22.7 KILOGRAMS/M^2
BH CV ECHO MEAS - BZI_METRIC_HEIGHT: 162.6 CM
BH CV ECHO MEAS - BZI_METRIC_WEIGHT: 59.9 KG
BH CV ECHO MEAS - DIST REN A EDV LEFT: 50.8 CM/SEC
BH CV ECHO MEAS - DIST REN A PSV LEFT: 231 CM/SEC
BH CV ECHO MEAS - DIST REN A RI LEFT: 0.77
BH CV ECHO MEAS - HILAR A EDV LEFT: 42.8 CM/SEC
BH CV ECHO MEAS - HILAR A PSV LEFT: 126.8 CM/SEC
BH CV ECHO MEAS - HILAR A RI LEFT: 0.66
BH CV ECHO MEAS - MID REN A EDV LEFT: 41.9 CM/S
BH CV ECHO MEAS - MID REN A PSV LEFT: 187 CM/S
BH CV ECHO MEAS - PROX REN A EDV LEFT: 28.4 CM/SEC
BH CV ECHO MEAS - PROX REN A PSV LEFT: 125 CM/SEC
BH CV ECHO MEAS - PROX REN A RI LEFT: 0.78
BH CV VAS BP LEFT ARM: NORMAL MMHG
BH CV VAS KIDNEY HEIGHT LEFT: 4.4 CM
BH CV VAS RENAL AORTIC MID PSV: 143 CM/S
BH CV VAS RENAL HILUM LEFT EDV: 41.2 CM/S
BH CV VAS RENAL HILUM LEFT PSV: 115 CM/S
BH CV VAS RENAL HILUM RIGHT EDV: 35.8 CM/S
BH CV VAS RENAL HILUM RIGHT PSV: 137 CM/S
BH CV XLRA MEAS - KID L LEFT: 10.6 CM
BH CV XLRA MEAS - RENAL A ORG RI LEFT: 0.79
BH CV XLRA MEAS - SUP REN AO PSV: 143.7 CM/SEC
BH CV XLRA MEAS - SUP SEG EDV LEFT: 26.1 CM/SEC
BH CV XLRA MEAS - SUP SEG PSV LEFT: 63 CM/SEC
BH CV XLRA MEAS - SUP SEG RI LEFT: 0.59
BH CV XLRA MEAS DIST REN A EDV RIGHT: 41.2 CM/SEC
BH CV XLRA MEAS DIST REN A PSV RIGHT: 171 CM/SEC
BH CV XLRA MEAS DIST REN A RI RIGHT: 0.75
BH CV XLRA MEAS HILAR A EDV RIGHT: 39.7 CM/SEC
BH CV XLRA MEAS HILAR A PSV RIGHT: 143.6 CM/SEC
BH CV XLRA MEAS HILAR A RI RIGHT: 0.72
BH CV XLRA MEAS INF ARC EDV LEFT: 7.5 CM/SEC
BH CV XLRA MEAS INF ARC EDV RIGHT: 7.8 CM/SEC
BH CV XLRA MEAS INF ARC PSV LEFT: 23.8 CM/SEC
BH CV XLRA MEAS INF ARC PSV RIGHT: 24.8 CM/SEC
BH CV XLRA MEAS INF ARC RI LEFT: 0.69
BH CV XLRA MEAS INF ARC RI RIGHT: 0.68
BH CV XLRA MEAS INF SEG EDV LEFT: 25.7 CM/SEC
BH CV XLRA MEAS INF SEG EDV RIGHT: 17.7 CM/SEC
BH CV XLRA MEAS INF SEG PSV LEFT: 101.2 CM/SEC
BH CV XLRA MEAS INF SEG PSV RIGHT: 78.8 CM/SEC
BH CV XLRA MEAS INF SEG RI LEFT: 0.75
BH CV XLRA MEAS INF SEG RI RIGHT: 0.78
BH CV XLRA MEAS KID H RIGHT: 3.3 CM
BH CV XLRA MEAS KID L RIGHT: 10.3 CM
BH CV XLRA MEAS KID W RIGHT: 3.1 CM
BH CV XLRA MEAS MID REN A EDV RIGHT: 50.4 CM/SEC
BH CV XLRA MEAS MID REN A PSV RIGHT: 179 CM/SEC
BH CV XLRA MEAS MID REN A RI RIGHT: 0.71
BH CV XLRA MEAS PROX REN A EDV RIGHT: 32.1 CM/SEC
BH CV XLRA MEAS PROX REN A PSV RIGHT: 174 CM/SEC
BH CV XLRA MEAS PROX REN A RI RIGHT: 0.81
BH CV XLRA MEAS RAR LEFT: 1.6
BH CV XLRA MEAS RAR RIGHT: 1.4
BH CV XLRA MEAS RENAL A ORG EDV LEFT: 28.4 CM/SEC
BH CV XLRA MEAS RENAL A ORG EDV RIGHT: 47.4 CM/SEC
BH CV XLRA MEAS RENAL A ORG PSV LEFT: 127 CM/SEC
BH CV XLRA MEAS RENAL A ORG PSV RIGHT: 206 CM/SEC
BH CV XLRA MEAS RENAL A ORG RI RIGHT: 0.71
BH CV XLRA MEAS SUP ARC EDV RIGHT: 9.2 CM/SEC
BH CV XLRA MEAS SUP ARC PSV RIGHT: 27.5 CM/SEC
BH CV XLRA MEAS SUP ARC RI RIGHT: 0.67
BH CV XLRA MEAS SUP SEG EDV RIGHT: 24.4 CM/SEC
BH CV XLRA MEAS SUP SEG PSV RIGHT: 88 CM/SEC
BH CV XLRA MEAS SUP SEG RI RIGHT: 0.72
LEFT KIDNEY WIDTH: 5.6 CM
LEFT RENAL UPPER PARENCHYMA MAX: 23.4 CM/S
LEFT RENAL UPPER PARENCHYMA MIN: 7.1 CM/S
LEFT RENAL UPPER PARENCHYMA RI: 0.7
RIGHT RENAL UPPER PARENCHYMA MAX: 27.2 CM/S
RIGHT RENAL UPPER PARENCHYMA MIN: 8.8 CM/S
RIGHT RENAL UPPER PARENCHYMA RI: 0.68

## 2020-10-26 PROCEDURE — 76775 US EXAM ABDO BACK WALL LIM: CPT

## 2020-10-26 PROCEDURE — 93975 VASCULAR STUDY: CPT

## 2020-11-17 RX ORDER — FAMOTIDINE 20 MG/1
TABLET, FILM COATED ORAL
Qty: 60 TABLET | Refills: 5 | Status: SHIPPED | OUTPATIENT
Start: 2020-11-17 | End: 2021-06-11

## 2020-12-16 NOTE — TELEPHONE ENCOUNTER
Called patient. She wanted to make sure everything looked okay from our standpoint. Reassured her that her echo was within normal limits.   PROVIDER:[TOKEN:[[id number]],FOLLOWUP:[1-3 Days]]

## 2021-01-04 ENCOUNTER — OFFICE VISIT (OUTPATIENT)
Dept: FAMILY MEDICINE CLINIC | Facility: CLINIC | Age: 66
End: 2021-01-04

## 2021-01-04 VITALS
SYSTOLIC BLOOD PRESSURE: 134 MMHG | DIASTOLIC BLOOD PRESSURE: 70 MMHG | HEART RATE: 88 BPM | WEIGHT: 132.8 LBS | HEIGHT: 63 IN | RESPIRATION RATE: 16 BRPM | BODY MASS INDEX: 23.53 KG/M2 | OXYGEN SATURATION: 99 %

## 2021-01-04 DIAGNOSIS — F33.41 RECURRENT MAJOR DEPRESSIVE DISORDER, IN PARTIAL REMISSION (HCC): ICD-10-CM

## 2021-01-04 DIAGNOSIS — K58.9 IRRITABLE BOWEL SYNDROME WITHOUT DIARRHEA: ICD-10-CM

## 2021-01-04 DIAGNOSIS — R30.0 DYSURIA: Primary | ICD-10-CM

## 2021-01-04 DIAGNOSIS — R39.15 URINARY URGENCY: ICD-10-CM

## 2021-01-04 DIAGNOSIS — F41.9 ANXIETY: ICD-10-CM

## 2021-01-04 LAB
BILIRUB BLD-MCNC: NEGATIVE MG/DL
CLARITY, POC: CLEAR
COLOR UR: YELLOW
GLUCOSE UR STRIP-MCNC: NEGATIVE MG/DL
KETONES UR QL: NEGATIVE
LEUKOCYTE EST, POC: NEGATIVE
NITRITE UR-MCNC: NEGATIVE MG/ML
PH UR: 6 [PH] (ref 5–8)
PROT UR STRIP-MCNC: NEGATIVE MG/DL
RBC # UR STRIP: NEGATIVE /UL
SP GR UR: 1.01 (ref 1–1.03)
UROBILINOGEN UR QL: NORMAL

## 2021-01-04 PROCEDURE — 81003 URINALYSIS AUTO W/O SCOPE: CPT | Performed by: FAMILY MEDICINE

## 2021-01-04 PROCEDURE — 99214 OFFICE O/P EST MOD 30 MIN: CPT | Performed by: FAMILY MEDICINE

## 2021-01-04 RX ORDER — SERTRALINE HYDROCHLORIDE 100 MG/1
100 TABLET, FILM COATED ORAL DAILY
COMMUNITY
End: 2021-01-04 | Stop reason: SDUPTHER

## 2021-01-04 RX ORDER — SERTRALINE HYDROCHLORIDE 100 MG/1
100 TABLET, FILM COATED ORAL DAILY
Qty: 90 TABLET | Refills: 3 | Status: SHIPPED | OUTPATIENT
Start: 2021-01-04 | End: 2022-03-24

## 2021-01-04 NOTE — PATIENT INSTRUCTIONS
Kegel Exercises    Kegel exercises can help strengthen your pelvic floor muscles. The pelvic floor is a group of muscles that support your rectum, small intestine, and bladder. In females, pelvic floor muscles also help support the womb (uterus). These muscles help you control the flow of urine and stool.  Kegel exercises are painless and simple, and they do not require any equipment. Your provider may suggest Kegel exercises to:  · Improve bladder and bowel control.  · Improve sexual response.  · Improve weak pelvic floor muscles after surgery to remove the uterus (hysterectomy) or pregnancy (females).  · Improve weak pelvic floor muscles after prostate gland removal or surgery (males).  Kegel exercises involve squeezing your pelvic floor muscles, which are the same muscles you squeeze when you try to stop the flow of urine or keep from passing gas. The exercises can be done while sitting, standing, or lying down, but it is best to vary your position.  Exercises  How to do Kegel exercises:  1. Squeeze your pelvic floor muscles tight. You should feel a tight lift in your rectal area. If you are a female, you should also feel a tightness in your vaginal area. Keep your stomach, buttocks, and legs relaxed.  2. Hold the muscles tight for up to 10 seconds.  3. Breathe normally.  4. Relax your muscles.  5. Repeat as told by your health care provider.  Repeat this exercise daily as told by your health care provider. Continue to do this exercise for at least 4-6 weeks, or for as long as told by your health care provider.  You may be referred to a physical therapist who can help you learn more about how to do Kegel exercises.  Depending on your condition, your health care provider may recommend:  · Varying how long you squeeze your muscles.  · Doing several sets of exercises every day.  · Doing exercises for several weeks.  · Making Kegel exercises a part of your regular exercise routine.  This information is not intended  to replace advice given to you by your health care provider. Make sure you discuss any questions you have with your health care provider.  Document Revised: 08/07/2019 Document Reviewed: 08/07/2019  Elsevier Patient Education © 2020 [employer] Inc.

## 2021-01-05 NOTE — PROGRESS NOTES
Subjective   Etelvina Prescott is a 65 y.o. female.     Chief Complaint   Patient presents with   • Depression     Pt requesting a refill on Zoloft 100mg, says she was getting it from [facility] but would prefer to get it here since thats the only reason she goes there   • Difficulty Urinating     requesting to leave a UA for chronic dysuria and urgency, has seen urologist       History of Present Illness     Etelvina Prescott presents today for   Chief Complaint   Patient presents with   • Depression     Pt requesting a refill on Zoloft 100mg, says she was getting it from [facility] but would prefer to get it here since thats the only reason she goes there   • Difficulty Urinating     requesting to leave a UA for chronic dysuria and urgency, has seen urologist     Still having chronic urinary frequency and urgency.  She has seen urology.  Her IBS symptoms have gotten worse, some pain with defecation, abdominal bloating, constipation.    This patient is accompanied by their self who contributes to the history of their care.    The following portions of the patient's history were reviewed and updated as appropriate: allergies, current medications, past family history, past medical history, past social history, past surgical history and problem list.    Active Ambulatory Problems     Diagnosis Date Noted   • Atopic rhinitis 06/06/2016   • Impacted cerumen 06/06/2016   • Osteoarthritis of cervical spine 06/06/2016   • Depression 06/06/2016   • Tear film insufficiency 06/06/2016   • Headache 06/06/2016   • Hypertension 06/06/2016   • Irritable bowel syndrome 06/06/2016   • Sarcoidosis 06/06/2016   • Dyssomnia 06/06/2016   • Cobalamin deficiency 06/06/2016   • Vitamin D deficiency 06/06/2016   • Breast cancer (CMS/[facility])    • Preventative health care 06/07/2016   • Mitral regurgitation 08/12/2016   • Low back pain 08/14/2017   • Left hip pain 08/14/2017   • Chronic fatigue 08/14/2017   •  Lumbar spondylosis 2017   • Atypical glandular cells of undetermined significance (PRITESH) on cervical Pap smear 2018   • Use of tamoxifen (Nolvadex) 2018   • Endometrial hyperplasia 2018   • Greater trochanteric bursitis of left hip 2018   • Gluteal tendinitis of left buttock 2019   • Viral URI with cough 2019     Resolved Ambulatory Problems     Diagnosis Date Noted   • Right lower quadrant abdominal pain 2016   • Malignant neoplasm of breast (CMS/HCC) 2016   • Dysuria 2016   • Herpes zoster 2016   • Viral URI 2017   • Viral bronchitis 2017     Past Medical History:   Diagnosis Date   • Allergic rhinitis    • Chronic anxiety Adulthood   • Chronic constipation Adulthood   • Diverticulosis    • Dry eye syndrome    • History of exposure to tuberculosis Remote   • MVP (mitral valve prolapse)    • Nephrolithiasis    • Plantar fasciitis    • Sleep disturbances    • Uterine bleeding      Past Surgical History:   Procedure Laterality Date   • BREAST BIOPSY Right     Benign   •  SECTION   &    • COLONOSCOPY  ,     Diverticulosis only.   • COLONOSCOPY W/ POLYPECTOMY  Remote   • DILATATION AND CURETTAGE     • HYSTEROSCOPY ENDOMETRIAL ABLATION      persistant bleeding   • MASTECTOMY Bilateral 2010    bilateral with implants, breast cancer on the right     Family History   Problem Relation Age of Onset   • Alzheimer's disease Mother          age 89   • Hypothyroidism Mother    • Osteoporosis Mother    • Pulmonary embolism Mother    • Coronary artery disease Father    • Dementia Father    • Diabetes Father    • Hypertension Father    • Heart attack Father    • Hypothyroidism Sister    • Hypertension Sister    • Breast cancer Maternal Grandmother    • Stomach cancer Paternal Aunt    • Lung cancer Paternal Aunt      Social History     Socioeconomic History   • Marital status:       "Spouse name: Not on file   • Number of children: Not on file   • Years of education: Not on file   • Highest education level: Not on file   Tobacco Use   • Smoking status: Never Smoker   • Smokeless tobacco: Never Used   Substance and Sexual Activity   • Alcohol use: Yes     Alcohol/week: 4.0 standard drinks     Types: 4 Glasses of wine per week   • Drug use: No   • Sexual activity: Never     Partners: Male   Social History Narrative    Domestic life   lives in private home with , who has advanced MS with paraplegia.        Holiness    Spiritism        Sleep hygiene    in bed midnight to 8 AM for 7 or 8 hours of sleep        Caffeine use    2 cups of coffee daily        Exercise habits    walks 30 minutes 4 days weekly.  Upper body strengthening 3 days weekly        Diet   well-balanced low salt diet        Occupation   lifelong .  Currently working part time.        Hearing : No impairment        Vision : Corrects with trifocal glasses        Driving : No limitations       Review of Systems  Review of Systems -  General ROS: negative for - chills, fever or night sweats  Cardiovascular ROS: no chest pain or dyspnea on exertion  Gastrointestinal ROS: Positive for constipation  Genito-Urinary ROS: Chronic urinary urgency and retention    Objective   Blood pressure 134/70, pulse 88, resp. rate 16, height 160 cm (62.99\"), weight 60.2 kg (132 lb 12.8 oz), SpO2 99 %, not currently breastfeeding.  Nursing note reviewed  Physical Exam  Const: NAD, A&Ox4, Pleasant, Cooperative  Eyes: EOMI, no conjunctivitis  ENT: No nasal discharge present, neck supple  Cardiac: Regular rate and rhythm, no cyanosis  Resp: Respiratory rate within normal limits, no increased work of breathing, no audible wheezing or retractions noted  GI: No distention or ascites  MSK: Motor and sensation grossly intact in bilateral upper extremities  Neurologic: CN II-XII grossly intact  Psych: Appropriate mood and behavior.  Skin: " Warm, dry  Procedures  Assessment/Plan   Problem List Items Addressed This Visit        Gastrointestinal Abdominal     Irritable bowel syndrome       Mental Health    Depression    Relevant Medications    sertraline (ZOLOFT) 100 MG tablet      Other Visit Diagnoses     Dysuria    -  Primary    Relevant Orders    POC Urinalysis Dipstick, Automated (Completed)    Anxiety        Relevant Medications    sertraline (ZOLOFT) 100 MG tablet    Urinary urgency        Relevant Orders    CT Abdomen Pelvis With & Without Contrast        Believes of her urinary urgency is likely secondary to pelvic floor dysfunction, she has been recommended for pelvic floor physical therapy in the past but has not gone through with this.  A handout was provided on at home exercises in the meantime.  Urinalysis today was unremarkable.  Given her abdominal symptoms as well as her urinary issues, a CT of the abdomen would be recommended to rule out bulk obstruction or malignancy.  -Some of her urinary symptoms may also be secondary to medication side effects.  She is on sertraline 100 mg daily    Anxiety: She takes sertraline 100 mg daily prescribed by [facility], she does not see a therapist there, only prescriptions.  She very infrequently uses oxazepam, on the scale 2 tablets every 3 to 6 months.  She has previously gotten these from behavioral health, has a number of them at home.    See patient diagnoses and orders along with patient instructions for assessment, plan, and changes to care for patient.    Patient Instructions   Kegel Exercises    Kegel exercises can help strengthen your pelvic floor muscles. The pelvic floor is a group of muscles that support your rectum, small intestine, and bladder. In females, pelvic floor muscles also help support the womb (uterus). These muscles help you control the flow of urine and stool.  Kegel exercises are painless and simple, and they do not require any equipment. Your provider may  suggest Kegel exercises to:  · Improve bladder and bowel control.  · Improve sexual response.  · Improve weak pelvic floor muscles after surgery to remove the uterus (hysterectomy) or pregnancy (females).  · Improve weak pelvic floor muscles after prostate gland removal or surgery (males).  Kegel exercises involve squeezing your pelvic floor muscles, which are the same muscles you squeeze when you try to stop the flow of urine or keep from passing gas. The exercises can be done while sitting, standing, or lying down, but it is best to vary your position.  Exercises  How to do Kegel exercises:  1. Squeeze your pelvic floor muscles tight. You should feel a tight lift in your rectal area. If you are a female, you should also feel a tightness in your vaginal area. Keep your stomach, buttocks, and legs relaxed.  2. Hold the muscles tight for up to 10 seconds.  3. Breathe normally.  4. Relax your muscles.  5. Repeat as told by your health care provider.  Repeat this exercise daily as told by your health care provider. Continue to do this exercise for at least 4-6 weeks, or for as long as told by your health care provider.  You may be referred to a physical therapist who can help you learn more about how to do Kegel exercises.  Depending on your condition, your health care provider may recommend:  · Varying how long you squeeze your muscles.  · Doing several sets of exercises every day.  · Doing exercises for several weeks.  · Making Kegel exercises a part of your regular exercise routine.  This information is not intended to replace advice given to you by your health care provider. Make sure you discuss any questions you have with your health care provider.  Document Revised: 08/07/2019 Document Reviewed: 08/07/2019  Elsevier Patient Education © 2020 Elsevier Inc.        No follow-ups on file.    Ambulatory progress note signed and attested to by Marcio Bojorquez D.O.

## 2021-01-14 ENCOUNTER — HOSPITAL ENCOUNTER (OUTPATIENT)
Dept: CT IMAGING | Facility: HOSPITAL | Age: 66
Discharge: HOME | End: 2021-01-14
Admitting: FAMILY MEDICINE

## 2021-01-14 DIAGNOSIS — R39.15 URINARY URGENCY: ICD-10-CM

## 2021-01-14 LAB — CREAT BLDA-MCNC: 0.7 MG/DL (ref 0.6–1.3)

## 2021-01-14 PROCEDURE — 82565 ASSAY OF CREATININE: CPT

## 2021-01-14 PROCEDURE — 74178 CT ABD&PLV WO CNTR FLWD CNTR: CPT

## 2021-01-14 PROCEDURE — 25010000002 IOPAMIDOL 61 % SOLUTION: Performed by: FAMILY MEDICINE

## 2021-01-14 RX ADMIN — IOPAMIDOL 80 ML: 612 INJECTION, SOLUTION INTRAVENOUS at 12:12

## 2021-01-27 RX ORDER — ENALAPRIL MALEATE 20 MG/1
TABLET ORAL
Qty: 90 TABLET | Refills: 1 | Status: SHIPPED | OUTPATIENT
Start: 2021-01-27 | End: 2021-07-12

## 2021-02-12 ENCOUNTER — TELEPHONE (OUTPATIENT)
Dept: FAMILY MEDICINE CLINIC | Facility: CLINIC | Age: 66
End: 2021-02-12

## 2021-02-12 DIAGNOSIS — R30.0 DYSURIA: Primary | ICD-10-CM

## 2021-02-12 RX ORDER — NITROFURANTOIN 25; 75 MG/1; MG/1
100 CAPSULE ORAL 2 TIMES DAILY
Qty: 10 CAPSULE | Refills: 0 | Status: SHIPPED | OUTPATIENT
Start: 2021-02-12 | End: 2021-02-17

## 2021-02-12 NOTE — TELEPHONE ENCOUNTER
PT CALLED STATED THAT SHE USED A TESTING STRIP TO TEST FOR UTI, AND IT CAME BACK POSITIVE, PT REQUEST MEDS TO TREAT URI.    PLEASE ADVISE.  CALL BACK:[phone]      [facility] [address]

## 2021-03-09 ENCOUNTER — OFFICE VISIT (OUTPATIENT)
Dept: FAMILY MEDICINE CLINIC | Facility: CLINIC | Age: 66
End: 2021-03-09

## 2021-03-09 VITALS
HEIGHT: 63 IN | BODY MASS INDEX: 23.39 KG/M2 | OXYGEN SATURATION: 97 % | HEART RATE: 70 BPM | WEIGHT: 132 LBS | SYSTOLIC BLOOD PRESSURE: 112 MMHG | DIASTOLIC BLOOD PRESSURE: 70 MMHG

## 2021-03-09 DIAGNOSIS — G47.9 DYSSOMNIA: ICD-10-CM

## 2021-03-09 DIAGNOSIS — R39.15 URINARY URGENCY: Primary | ICD-10-CM

## 2021-03-09 LAB
BILIRUB BLD-MCNC: NEGATIVE MG/DL
CLARITY, POC: CLEAR
COLOR UR: YELLOW
GLUCOSE UR STRIP-MCNC: NEGATIVE MG/DL
KETONES UR QL: NEGATIVE
LEUKOCYTE EST, POC: NEGATIVE
NITRITE UR-MCNC: NEGATIVE MG/ML
PH UR: 6 [PH] (ref 5–8)
PROT UR STRIP-MCNC: NEGATIVE MG/DL
RBC # UR STRIP: NEGATIVE /UL
SP GR UR: 1.01 (ref 1–1.03)
UROBILINOGEN UR QL: ABNORMAL

## 2021-03-09 PROCEDURE — 99213 OFFICE O/P EST LOW 20 MIN: CPT | Performed by: FAMILY MEDICINE

## 2021-03-09 PROCEDURE — 81003 URINALYSIS AUTO W/O SCOPE: CPT | Performed by: FAMILY MEDICINE

## 2021-03-09 RX ORDER — TRAZODONE HYDROCHLORIDE 100 MG/1
100 TABLET ORAL
Qty: 90 TABLET | Refills: 3 | Status: SHIPPED | OUTPATIENT
Start: 2021-03-09 | End: 2021-12-21 | Stop reason: SDUPTHER

## 2021-03-09 RX ORDER — NITROFURANTOIN 25; 75 MG/1; MG/1
100 CAPSULE ORAL 2 TIMES DAILY
Qty: 10 CAPSULE | Refills: 0 | Status: SHIPPED | OUTPATIENT
Start: 2021-03-09 | End: 2021-03-14

## 2021-03-09 NOTE — PROGRESS NOTES
Subjective   Etelvina Prescott is a 65 y.o. female.     Chief Complaint   Patient presents with   • Urinary Tract Infection     follow up on UTI Pt thinks she may still have a infection. Urine sample and UA dip in offoce today.        History of Present Illness     Etelvina Prescott presents today for   Chief Complaint   Patient presents with   • Urinary Tract Infection     follow up on UTI Pt thinks she may still have a infection. Urine sample and UA dip in offoce today.       Ms. Prescott presents today for persistent urinary tract symptoms. She was last seen in 01/2021, for similar symptoms of urinary urgency. Urinalysis at that time was unremarkable.Aa CT of the abdomen and pelvis was notable for a large stool burden and constipation consistent with some abdominal symptoms she was having at the time, but no mass effect, inflammation, or other acute process that would explain the urinary urgency or dysuria. We had discussed that she is on the sertraline, which can contribute to some urinary symptoms, and she was given a handout on pelvic floor exercises/Kegel exercises. She has been recommended for pelvic floor physical therapy in the past, but has not gone through with it.     Ms. Prescott comes in to the clinic today stating she is still having nausea, but it is better. She reports that she is still having pelvic cramping, but it is now greatly improved. The urine looks clear today. She reports that she has been trying to do the pelvic floor exercises.     She reports that she has received her COVID vaccine.    This patient is accompanied by their self who contributes to the history of their care.    The following portions of the patient's history were reviewed and updated as appropriate: allergies, current medications, past family history, past medical history, past social history, past surgical history and problem list.    Active Ambulatory Problems     Diagnosis Date Noted   • Atopic rhinitis 06/06/2016    • Impacted cerumen 2016   • Osteoarthritis of cervical spine 2016   • Depression 2016   • Tear film insufficiency 2016   • Headache 2016   • Hypertension 2016   • Irritable bowel syndrome 2016   • Sarcoidosis 2016   • Dyssomnia 2016   • Cobalamin deficiency 2016   • Vitamin D deficiency 2016   • Breast cancer (CMS/[facility])    • Preventative health care 2016   • Mitral regurgitation 2016   • Low back pain 2017   • Left hip pain 2017   • Chronic fatigue 2017   • Lumbar spondylosis 2017   • Atypical glandular cells of undetermined significance (PRITESH) on cervical Pap smear 2018   • Use of tamoxifen (Nolvadex) 2018   • Endometrial hyperplasia 2018   • Greater trochanteric bursitis of left hip 2018   • Gluteal tendinitis of left buttock 2019   • Viral URI with cough 2019     Resolved Ambulatory Problems     Diagnosis Date Noted   • Right lower quadrant abdominal pain 2016   • Malignant neoplasm of breast (CMS/[facility]) 2016   • Dysuria 2016   • Herpes zoster 2016   • Viral URI 2017   • Viral bronchitis 2017     Past Medical History:   Diagnosis Date   • Allergic rhinitis    • Chronic anxiety Adulthood   • Chronic constipation Adulthood   • Diverticulosis    • Dry eye syndrome    • History of exposure to tuberculosis Remote   • MVP (mitral valve prolapse)    • Nephrolithiasis    • Plantar fasciitis    • Sleep disturbances    • Uterine bleeding      Past Surgical History:   Procedure Laterality Date   • BREAST BIOPSY Right     Benign   •  SECTION   &    • COLONOSCOPY  ,     Diverticulosis only.   • COLONOSCOPY W/ POLYPECTOMY  Remote   • DILATATION AND CURETTAGE     • HYSTEROSCOPY ENDOMETRIAL ABLATION      persistant bleeding   • MASTECTOMY Bilateral 2010    bilateral with implants, breast cancer on  "the right     Family History   Problem Relation Age of Onset   • Alzheimer's disease Mother          age 89   • Hypothyroidism Mother    • Osteoporosis Mother    • Pulmonary embolism Mother    • Coronary artery disease Father    • Dementia Father    • Diabetes Father    • Hypertension Father    • Heart attack Father    • Hypothyroidism Sister    • Hypertension Sister    • Breast cancer Maternal Grandmother    • Stomach cancer Paternal Aunt    • Lung cancer Paternal Aunt      Social History     Socioeconomic History   • Marital status:      Spouse name: Not on file   • Number of children: Not on file   • Years of education: Not on file   • Highest education level: Not on file   Tobacco Use   • Smoking status: Never Smoker   • Smokeless tobacco: Never Used   Substance and Sexual Activity   • Alcohol use: Yes     Alcohol/week: 4.0 standard drinks     Types: 4 Glasses of wine per week   • Drug use: No   • Sexual activity: Never     Partners: Male       Review of Systems  Review of Systems -  General ROS: negative for - chills, fever or night sweats  Cardiovascular ROS: no chest pain or dyspnea on exertion  Gastrointestinal ROS: no abdominal pain, change in bowel habits, or black or bloody stools  Genito-Urinary ROS: no dysuria, trouble voiding, or hematuria    Objective   Blood pressure 112/70, pulse 70, height 160 cm (62.99\"), weight 59.9 kg (132 lb), SpO2 97 %, not currently breastfeeding.  Nursing note reviewed  Physical Exam  Const: NAD, A&Ox4, Pleasant, Cooperative  Eyes: EOMI, no conjunctivitis  ENT: No nasal discharge present, neck supple  Cardiac: Regular rate and rhythm, no cyanosis  Resp: Respiratory rate within normal limits, no increased work of breathing, no audible wheezing or retractions noted  GI: No distention or ascites  MSK: Motor and sensation grossly intact in bilateral upper extremities  Neurologic: CN II-XII grossly intact  Psych: Appropriate mood and behavior.  Skin: Warm, " dry  Procedures  Assessment/Plan   Problem List Items Addressed This Visit        Sleep    Dyssomnia    Relevant Medications    traZODone (DESYREL) 100 MG tablet      Other Visit Diagnoses     Urinary urgency    -  Primary    Relevant Medications    nitrofurantoin, macrocrystal-monohydrate, (MACROBID) 100 MG capsule    Other Relevant Orders    POC Urinalysis Dipstick, Automated (Completed)    Urine Culture - Urine, Urine, Clean Catch    Ambulatory Referral to Physical Therapy Pelvic Floor          1. Urinary urgency, and frequency with mild retention  - Discussed reducing the sertraline to 50 mg, and keeping the trazadone at 1 tablet nightly  - I would like her to see the pelvic floor physical therapy, brochure given to patient today, and referral placed. She can follow up with Dr. Davis. Will send urine for culture, antibiotic if culture returns positive.    2. Irritable bowel syndrome with constipation  - Use dicyclomine as needed, use milk of magnesia as needed for constipation    I will plan to see her for her next scheduled follow-up in the summer. If she has recurrence of these issues, she should follow up with her gynecologist and urologist.    Patient Instructions   1.  Try reducing sertraline to 50mg daily    2.  See pelvic floor physical therapy    3.  Will send in antibiotic if needed      No follow-ups on file.    Ambulatory progress note signed and attested to by Marcio Bojorquez D.O.         Scribed for Marcio Bojorquez DO by Beckie Lopez.  03/09/21   13:22 EST    I have personally performed the services described in this document as scribed by the above individual, and it is both accurate and complete.  Marcio Bojorquez DO  3/9/2021  15:49 EST

## 2021-03-09 NOTE — PATIENT INSTRUCTIONS
1.  Try reducing sertraline to 50mg daily    2.  See pelvic floor physical therapy    3.  Will send in antibiotic if needed

## 2021-03-12 ENCOUNTER — TELEPHONE (OUTPATIENT)
Dept: FAMILY MEDICINE CLINIC | Facility: CLINIC | Age: 66
End: 2021-03-12

## 2021-03-12 NOTE — TELEPHONE ENCOUNTER
Pt called stating that Dr. Bojorquez has referred her for physical therapy. She wants to know if she can go to the same place that she has been before. She has already called to make sure that they do the therapy that Dr. Bojorquez is wanting her to do. She would like to go to:  [facility]  [address] [place] [address]  Phone: [phone]

## 2021-03-13 LAB
BACTERIA UR CULT: ABNORMAL
BACTERIA UR CULT: ABNORMAL
OTHER ANTIBIOTIC SUSC ISLT: ABNORMAL

## 2021-04-07 ENCOUNTER — TREATMENT (OUTPATIENT)
Dept: PHYSICAL THERAPY | Facility: CLINIC | Age: 66
End: 2021-04-07

## 2021-04-07 DIAGNOSIS — R10.2 PELVIC PAIN: ICD-10-CM

## 2021-04-07 DIAGNOSIS — R39.15 URINARY URGENCY: Primary | ICD-10-CM

## 2021-04-07 DIAGNOSIS — N94.89 HIGH-TONE PELVIC FLOOR DYSFUNCTION: ICD-10-CM

## 2021-04-07 PROCEDURE — 97162 PT EVAL MOD COMPLEX 30 MIN: CPT | Performed by: PHYSICAL THERAPIST

## 2021-04-07 PROCEDURE — 97530 THERAPEUTIC ACTIVITIES: CPT | Performed by: PHYSICAL THERAPIST

## 2021-04-07 NOTE — PROGRESS NOTES
Physical Therapy Initial Evaluation and Plan of Care    Patient: Etelvina Prescott   : 1955  Diagnosis/ICD-10 Code:  Urinary urgency [R39.15]  Referring practitioner: Marcio Bojorquez DO  Date of Initial Visit: 2021  Today's Date: 2021  Patient seen for 1 sessions      Subjective    Main issue for years has felt like she needs to go a lot and doesn't want to be too far from the bathroom. In last 3 months has had pain in lower pelvic area. Just wants to feel good again. Was noticing more at night when sitting and watching TV. Sometimes Ibuprofen helps sometimes not. Some days can feel it all day. Last 2 weeks has felt better. Feels like menstrual cramps used to.       Chief Complaint:   Chief Complaint   Patient presents with   • Initial Evaluation      Functional Outcome Measure: PFDI 20: 114.6    Pain  Dull crampy feeling more on R side lower abdomen    Average:Pelvic #: 4 Best: Pelvic #: 0 Worst: Pelvic #: 5  Aggs: sitting at then end of day but sometimes can be all day    Bladder function:  Increased frequency of UTI over last 2 years - went to see a urologist due to getting frequently; typically is showing infection  Incontinence: with urge occurs when stands up  # of pads in 24 hours: 1   How soaked is pad when changed: 20%  What specifically makes you leak: urge  Leak at night: no  Urination at night: 1x usually  Use bathroom “just in case”: yes  Strong urinary urge: no  Leaking with urge: yes  Urge triggers: standing up  Complete bladder voiding: not always emptying fully - since this all started - sits and goes and if sits 2-3 minutes can void more  Urinary splaying: not typically - occasional if has been sitting a while  Post-micturition dribble: no  Frequency of urination: every 2-3 hours  Discomfort with urination: only with UTI  Vaginal or anal itching: no  Fluid consumed daily: glass of water AM, large cup of coffee, drinks water or iced tea - 3-4 glasses of water per day    Bowel  function:  Denies issues other than occasional incomplete bowel movement       Sexual activity  Sexually active: not active      Prior PT or other treatment:     Diagnostics:    PMH:   Past Medical History:   Diagnosis Date   • Allergic rhinitis    • Breast cancer (CMS/HCC) 2010    Right -Taxotere and Cytoxan ×4.  Tamoxifen.  Mastectomy   • Chronic anxiety Adulthood    Good response to citalopram   • Chronic constipation Adulthood    MOM or MiraLAX   • Chronic fatigue    • Depression     Good response to amitriptyline and citalopram   • Diverticulosis     Per colonoscopy   • Dry eye syndrome     Moisturizers   • Herpes zoster    • History of exposure to tuberculosis Remote    PPDs  onward all negative   • Hypertension    • Impacted cerumen    • Irritable bowel syndrome    • MVP (mitral valve prolapse)     Recurring tachycardias   • Nephrolithiasis    • Plantar fasciitis    • Sarcoidosis     Active in eyes and lungs   • Sleep disturbances     Good response to amitriptyline and trazodone   • Uterine bleeding     Uterine ablation for bleeding and chronic iron deficiency        Surgical HX:   Past Surgical History:   Procedure Laterality Date   • BREAST BIOPSY Right 1988    Benign   •  SECTION   &    • COLONOSCOPY  ,     Diverticulosis only.   • COLONOSCOPY W/ POLYPECTOMY  Remote   • DILATATION AND CURETTAGE  2016   • HYSTEROSCOPY ENDOMETRIAL ABLATION  2006    persistant bleeding   • MASTECTOMY Bilateral 2010    bilateral with implants, breast cancer on the right        Menstrual cycle: n/a    Birth HX:  - c-sections    Meds:   Current Outpatient Medications:   •  cycloSPORINE (RESTASIS) 0.05 % ophthalmic emulsion, Apply 1 drop to eye Every 12 (Twelve) Hours., Disp: , Rfl:   •  diclofenac (VOLTAREN) 1 % gel gel, Apply 4 g topically to the appropriate area as directed 4 (Four) Times a Day As Needed (hip pain)., Disp: 100 g, Rfl: 1  •  dicyclomine  (BENTYL) 20 MG tablet, Take 1 tablet by mouth Every 6 (Six) Hours., Disp: 120 tablet, Rfl: 11  •  enalapril (VASOTEC) 20 MG tablet, TAKE 1/2 TABLET TWICE DAILY, Disp: 90 tablet, Rfl: 1  •  famotidine (PEPCID) 20 MG tablet, TAKE ONE TABLET BY MOUTH TWICE A DAY AS NEEDED FOR HEARTBURN, Disp: 60 tablet, Rfl: 5  •  fexofenadine (ALLEGRA) 180 MG tablet, Take 1 tablet by mouth Daily As Needed., Disp: , Rfl:   •  ibuprofen (ADVIL,MOTRIN) 400 MG tablet, Take 1 tablet by mouth 2 (Two) Times a Day As Needed for Mild Pain ., Disp: 180 tablet, Rfl: 1  •  Multiple Vitamin (MULTIVITAMIN) capsule, Take  by mouth daily., Disp: , Rfl:   •  polyvinyl alcohol-povidone (REFRESH) 1.4-0.6 % ophthalmic solution, Apply 1 drop to eye 2 (two) times a day., Disp: , Rfl:   •  sertraline (ZOLOFT) 100 MG tablet, Take 1 tablet by mouth Daily., Disp: 90 tablet, Rfl: 3  •  traZODone (DESYREL) 100 MG tablet, Take 1 tablet by mouth every night at bedtime., Disp: 90 tablet, Rfl: 3     Occupation: retired            Objective      verbal consent obtained for internal pelvic exam/treatment with declined need for second person in room    Palpation:   Supine:   Abdominals - mild tension B;  Iliacus, psoas - moderate tension R, mild L  Adductors: mild tension B proximally    External:    Ischiocavernosus - unremarkable for tension B   Supf and deep transverse perineal mm - unremarkable for tension B   Sensation intact to light touch B   Skin: normal, no gross abnormalities    Internal:   Sensation: intact  Bulge: incomplete  Knack: weak  Wall Laxity: mild anterior     L/R supf mm: mild tension B   Levator ani: mod tension L with reproduction of discomfort; mild R   Obturator internus: mild B   Compressor urethra: mild B    PERF SCALE:  Power: 2-    Endurance: 4    Repetitions: 3    Fast twitch: 3      See flowsheet for details of treatment following evaluation        Assessment/Plan:     The patient is a 66 y.o. female who presents to physical therapy  today for urge incontinence and pelvic pain. Upon initial evaluation, the patient demonstrates the following impairments: increased tension of hip and pelvic floor mm R>L, incomplete relaxation of pelvic floor mm, decreased strength, endurance and coordination of pelvic floor mm. Due to these impairments, the patient is unable to have urge without rushing to bathroom or incontinence, sit for prolonged periods without pain. The patient would benefit from skilled pelvic PT services to address functional limitations and impairments and to improve patient quality of life.      Goals:   STG's: 4 weeks  · Patient will report > 25% reduction in overall pain   · Patient will report > 25% improvement in urinary symptoms  · Patient will   · Patient will be able to perform HEP with minimal verbal cues    LTG's: By discharge  · Patient will report a decrease in pain to < 1/10  · Patient will report >75% improvement in urinary symptoms   · Patient will report an elimination of urinary urgency   · Patient will be able to tolerate sitting >90 minutes without increased pain   · Patient will decrease voiding frequency to once every 3-4 hours  · Patient will be independent with HEP      Plan  Therapy options: will be seen for skilled physical therapy services  Planned modality interventions: TENS, ultrasound, cryotherapy, thermotherapy (hydrocollator packs) and high voltage pulsed current (pain management)  Planned therapy interventions: abdominal trunk stabilization, manual therapy, neuromuscular re-education, body mechanics training, flexibility, functional ROM exercises, gait training, home exercise program, joint mobilization, therapeutic activities, stretching, strengthening, spinal/joint mobilization, soft tissue mobilization and postural training  Pt prognosis: good  Frequency: 12  Duration in visits: 12  Duration in weeks: 12  Treatment plan discussed with: patient      8643/6078  Timed:         Manual Therapy:    0     mins   97205;     Therapeutic Exercise:    0     mins  70112;     Neuromuscular Ja:    0    mins  82210;    Therapeutic Activity:     8     mins  66856;     Gait Trainin     mins  03393;     Ultrasound:     0     mins  52154;    Ionto                               0    mins   36664  Self Care                       0     mins   96132  Canalith Repos    0     mins 79236      Un-Timed:  Electrical Stimulation:    0     mins  02716 ( );  Dry Needling     0     mins self-pay  Traction     0     mins 02105  Low Eval     0     Mins  16731  Mod Eval     35     Mins  03159  High Eval                       0     Mins  33343  Re-Eval                           0    mins  41379        Timed Treatment:   8   mins   Total Treatment:     43   mins    PT SIGNATURE:Kristian Jaquez PT, DPT  DATE TREATMENT INITIATED: 2021    Initial Certification  Certification Period: 2021  I certify that the therapy services are furnished while this patient is under my care.  The services outlined above are required by this patient, and will be reviewed every 90 days.     PHYSICIAN: Marcio Bojorquez,       DATE: 2021     Please sign and return via fax to [phone]. Thank you, [facility].

## 2021-04-13 ENCOUNTER — TREATMENT (OUTPATIENT)
Dept: PHYSICAL THERAPY | Facility: CLINIC | Age: 66
End: 2021-04-13

## 2021-04-13 DIAGNOSIS — R10.2 PELVIC PAIN: ICD-10-CM

## 2021-04-13 DIAGNOSIS — R39.15 URINARY URGENCY: Primary | ICD-10-CM

## 2021-04-13 DIAGNOSIS — N94.89 HIGH-TONE PELVIC FLOOR DYSFUNCTION: ICD-10-CM

## 2021-04-13 PROCEDURE — 97140 MANUAL THERAPY 1/> REGIONS: CPT | Performed by: PHYSICAL THERAPIST

## 2021-04-13 PROCEDURE — 97110 THERAPEUTIC EXERCISES: CPT | Performed by: PHYSICAL THERAPIST

## 2021-04-15 NOTE — PROGRESS NOTES
Physical Therapy Daily Progress Note  Patient: Etelvina Prescott   : 1955  Diagnosis/ICD-10 Code:  Urinary urgency [R39.15]  Referring practitioner: Marcio Bojorquez  Date of Initial Visit: Type: THERAPY  Noted: 2021  Today's Date: 2021  Patient seen for 2 sessions      Subjective   Etelvina Prescott reports no change in symptoms.   Pain Rating (0-10): 0      Objective   verbal consent obtained for internal pelvic exam/treatment with declined need for second person in room     See Exercise, Manual, and Modality Logs for complete treatment.         Assessment/Plan  Pt tolerated manual therapy with mild discomfort. She demonstrated decreasing mm tension following manual techniques. Pt instructed in pfm coordination and strengthening to decrease incontinence. Will continue manual and progress as tolerated to reduce bladder symptoms.    Progress per Plan of Care         1007/145   Timed:         Manual Therapy:    30     mins  49738;     Therapeutic Exercise:    8     mins  85489;     Neuromuscular Ja:    0    mins  02644;    Therapeutic Activity:     0     mins  93477;     Gait Trainin     mins  75866;     Ultrasound:     0     mins  89420;    Ionto                               0    mins   36032  Self Care                       0     mins   54221  Canalith Repos               0    mins  68709    Un-Timed:  Electrical Stimulation:    0     mins  52854 ( );  Dry Needling     0     mins self-pay  Traction     0     mins 54268  Low Eval     0     Mins  17482  Mod Eval     0     Mins  62846  High Eval                       0     Mins  97953  Re-Eval                           0    mins  22279    Timed Treatment:  38   mins   Total Treatment:     38   mins    Kristian Jaquez PT  [place] License # [id number]  Physical Therapist

## 2021-04-19 ENCOUNTER — TREATMENT (OUTPATIENT)
Dept: PHYSICAL THERAPY | Facility: CLINIC | Age: 66
End: 2021-04-19

## 2021-04-19 DIAGNOSIS — R10.2 PELVIC PAIN: ICD-10-CM

## 2021-04-19 DIAGNOSIS — N94.89 HIGH-TONE PELVIC FLOOR DYSFUNCTION: ICD-10-CM

## 2021-04-19 DIAGNOSIS — R39.15 URINARY URGENCY: Primary | ICD-10-CM

## 2021-04-19 PROCEDURE — 97110 THERAPEUTIC EXERCISES: CPT | Performed by: PHYSICAL THERAPIST

## 2021-04-19 PROCEDURE — 97140 MANUAL THERAPY 1/> REGIONS: CPT | Performed by: PHYSICAL THERAPIST

## 2021-04-19 NOTE — PROGRESS NOTES
Physical Therapy Daily Progress Note  Patient: Etelvina Prescott   : 1955  Diagnosis/ICD-10 Code:  Urinary urgency [R39.15]  Referring practitioner: Marcio Bojorquez DO  Date of Initial Visit: Type: THERAPY  Noted: 2021  Today's Date: 2021  Patient seen for 3 sessions      Subjective   Etelvina Prescott reports felt better after last visit. Noticing lower abdominal pain at night more than any other time. A little trouble with exercises.   Feeling first few contractions around anal area then can focus on front area.      Pain Rating (0-10): 0      Objective   verbal consent obtained for internal pelvic exam/treatment with declined need for second person in room     See Exercise, Manual, and Modality Logs for complete treatment.       Assessment/Plan  Pt tolerated manual therapy with mild discomfort. She demonstrated decreasing mm tension following manual techniques. Pt instructed in advanced HEP in seated for PFM coordination/strengthening. She has been compliant with HEP. Will continue manual and progress as tolerated to reduce bladder symptoms.       Progress per Plan of Care         0930/1010   Timed:         Manual Therapy:    25     mins  79437;     Therapeutic Exercise:    15     mins  18217;     Neuromuscular Ja:    0    mins  93961;    Therapeutic Activity:     0     mins  71768;     Gait Trainin     mins  41734;     Ultrasound:     0     mins  74208;    Ionto                               0    mins   96579  Self Care                       0     mins   23669  Canalith Repos               0    mins  29811    Un-Timed:  Electrical Stimulation:    0     mins  42402 ( );  Dry Needling     0     mins self-pay  Traction     0     mins 54709  Low Eval     0     Mins  70507  Mod Eval     0     Mins  44673  High Eval                       0     Mins  91389  Re-Eval                           0    mins  43094    Timed Treatment:   40   mins   Total Treatment:     40   mins    Kristian  Leonid, PT  [place] License # [id number]  Physical Therapist

## 2021-04-26 ENCOUNTER — TREATMENT (OUTPATIENT)
Dept: PHYSICAL THERAPY | Facility: CLINIC | Age: 66
End: 2021-04-26

## 2021-04-26 DIAGNOSIS — R39.15 URINARY URGENCY: Primary | ICD-10-CM

## 2021-04-26 DIAGNOSIS — N94.89 HIGH-TONE PELVIC FLOOR DYSFUNCTION: ICD-10-CM

## 2021-04-26 DIAGNOSIS — R10.2 PELVIC PAIN: ICD-10-CM

## 2021-04-26 PROCEDURE — 97112 NEUROMUSCULAR REEDUCATION: CPT | Performed by: PHYSICAL THERAPIST

## 2021-04-26 PROCEDURE — 97140 MANUAL THERAPY 1/> REGIONS: CPT | Performed by: PHYSICAL THERAPIST

## 2021-04-26 NOTE — PROGRESS NOTES
Physical Therapy Daily Progress Note  Patient: Etelvina Prescott   : 1955  Diagnosis/ICD-10 Code:  Urinary urgency [R39.15]  Referring practitioner: Marcio Bojorquez DO  Date of Initial Visit: Type: THERAPY  Noted: 2021  Today's Date: 2021  Patient seen for 4 sessions      Subjective   Etelvina Prescott reports feels like less pain every week. A little this morning then went away when went to the bathroom.   Pain Rating (0-10): 0      Objective   verbal consent obtained for external pelvic exam/treatment with declined need for second person in room   SEMG:   Supine: rest 0.8, avg 8.0, max 14.3    See Exercise, Manual, and Modality Logs for complete treatment.     Assessment/Plan   Pt with decreasing pain, though some still seems to be associated with bladder fullness. Increased focus on abdominal, hip flexor MFR and bladder mobilizations today. Pt did well with sEMG for PFM coordination and strengthening and HEP was progressed to include.  Will continue manual and progress strengthening as indicated to reduce pain and bladder symptoms.       Progress per Plan of Care         0930/1015   Timed:         Manual Therapy:    23     mins  05587;     Therapeutic Exercise:    0     mins  76050;     Neuromuscular Ja:    15    mins  90331;    Therapeutic Activity:     0     mins  65962;     Gait Trainin     mins  96643;     Ultrasound:     0     mins  10649;    Ionto                               0    mins   96482  Self Care                       0     mins   39240  Canalith Repos               0    mins  68450    Un-Timed:  Electrical Stimulation:    0     mins  38202 ( );  Dry Needling     0     mins self-pay  Traction     0     mins 37286  Low Eval     0     Mins  97425  Mod Eval     0     Mins  11016  High Eval                       0     Mins  51840  Re-Eval                           0    mins  55101    Timed Treatment:   38   mins   Total Treatment:     38   mins    Kristian Jaquez  PT  [place] License # [id number]  Physical Therapist

## 2021-05-04 ENCOUNTER — TREATMENT (OUTPATIENT)
Dept: PHYSICAL THERAPY | Facility: CLINIC | Age: 66
End: 2021-05-04

## 2021-05-04 DIAGNOSIS — N94.89 HIGH-TONE PELVIC FLOOR DYSFUNCTION: ICD-10-CM

## 2021-05-04 DIAGNOSIS — R39.15 URINARY URGENCY: Primary | ICD-10-CM

## 2021-05-04 DIAGNOSIS — R10.2 PELVIC PAIN: ICD-10-CM

## 2021-05-04 PROCEDURE — 97110 THERAPEUTIC EXERCISES: CPT | Performed by: PHYSICAL THERAPIST

## 2021-05-04 PROCEDURE — 97140 MANUAL THERAPY 1/> REGIONS: CPT | Performed by: PHYSICAL THERAPIST

## 2021-05-10 ENCOUNTER — TREATMENT (OUTPATIENT)
Dept: PHYSICAL THERAPY | Facility: CLINIC | Age: 66
End: 2021-05-10

## 2021-05-10 DIAGNOSIS — R10.2 PELVIC PAIN: ICD-10-CM

## 2021-05-10 DIAGNOSIS — N94.89 HIGH-TONE PELVIC FLOOR DYSFUNCTION: ICD-10-CM

## 2021-05-10 DIAGNOSIS — R39.15 URINARY URGENCY: Primary | ICD-10-CM

## 2021-05-10 PROCEDURE — 97140 MANUAL THERAPY 1/> REGIONS: CPT | Performed by: PHYSICAL THERAPIST

## 2021-05-10 PROCEDURE — 97110 THERAPEUTIC EXERCISES: CPT | Performed by: PHYSICAL THERAPIST

## 2021-05-10 NOTE — PROGRESS NOTES
Re-Assessment / Re-Certification        Patient: Etelvina Prescott   : 1955  Diagnosis/ICD-10 Code:  Urinary urgency [R39.15]  Referring practitioner: Marcio Bojorquez DO  Date of Initial Visit: Type: THERAPY  Noted: 2021  Today's Date: 2021  Patient seen for 6 sessions      Subjective:   Etelvina Presoctt reports: pain is better about 50% improvement. Had a harder week this week with a little more pain down there. 3/10 today.   Reports about 50% improvement in bladder symptoms. Doesn't feel the urge quite as much and feels like has better control with holding it. Frequency every 2 hours-3 hours and it varies.       Subjective Questionnaire: PFDI 20  Clinical Progress: improved  Home Program Compliance: Yes  Treatment has included: therapeutic exercise, neuromuscular re-education, manual therapy, therapeutic activity and moist heat     OBJECTIVE:   verbal consent obtained for internal pelvic exam/treatment with declined need for second person in room     Palpation:   Supine:   Abdominals - mild tension B;  Iliacus, psoas - mild tension B  Adductors: mild tension B proximally       Internal:   Sensation: intact  Bulge: incomplete  Knack: weak  Wall Laxity: mild anterior                 L/R supf mm: mild tension B              Levator ani: mild tension B              Obturator internus: mild B              Compressor urethra: mild B     PERF SCALE:  Power: 3-     Endurance: 6     Repetitions: 4     Fast twitch: 4        See flowsheet for details of treatment following evaluation           Assessment/Plan:   Pt is making good progress and has met 3/3 short term goals at this time. She demonstrates decreasing PFM tension and improving strength. Pt is compliant with HEP and is to continue current exercises at this time as they are still challenging. Will continue manual and progress strengthening as indicated to resolve urinary symptoms and pain.     Goals:   STG's: 4 weeks  · Patient will report >  25% reduction in overall pain - met  · Patient will report > 25% improvement in urinary symptoms - met  · Patient will be able to perform HEP with minimal verbal cues - met     LTG's: By discharge  · Patient will report a decrease in pain to < 1/10  · Patient will report >75% improvement in urinary symptoms   · Patient will report an elimination of urinary urgency   · Patient will be able to tolerate sitting >90 minutes without increased pain   · Patient will decrease voiding frequency to once every 3-4 hours  · Patient will be independent with HEP      Recommendations: Continue as planned  Timeframe: 6 weeks  Prognosis to achieve goals: good    PT Signature: Kristian Jaquez PT      Based upon review of the patient's progress and continued therapy plan, it is my medical opinion that Etelvina Prescott should continue physical therapy treatment at [facility] PH[facility]  [address] [place] [id number] [phone].    Signature: __________________________________  Marcio Bojorquez DO      0930/1015  Manual Therapy:    30     mins  49678;  Therapeutic Exercise:    8     mins  81082;     Neuromuscular Ja:    0    mins  30397;    Therapeutic Activity:     0     mins  21765;     Gait Trainin     mins  10482;     Ultrasound:     0     mins  23564;    Electrical Stimulation:    0     mins  17204 ( );  Dry Needling     0     mins self-pay    Timed Treatment:   40   mins   Total Treatment:     40   mins

## 2021-05-25 ENCOUNTER — TREATMENT (OUTPATIENT)
Dept: PHYSICAL THERAPY | Facility: CLINIC | Age: 66
End: 2021-05-25

## 2021-05-25 DIAGNOSIS — R10.2 PELVIC PAIN: ICD-10-CM

## 2021-05-25 DIAGNOSIS — R39.15 URINARY URGENCY: Primary | ICD-10-CM

## 2021-05-25 DIAGNOSIS — N94.89 HIGH-TONE PELVIC FLOOR DYSFUNCTION: ICD-10-CM

## 2021-05-25 PROCEDURE — 97140 MANUAL THERAPY 1/> REGIONS: CPT | Performed by: PHYSICAL THERAPIST

## 2021-05-25 PROCEDURE — 97110 THERAPEUTIC EXERCISES: CPT | Performed by: PHYSICAL THERAPIST

## 2021-05-25 NOTE — PROGRESS NOTES
Physical Therapy Daily Progress Note  Patient: Etelvina Prescott   : 1955  Diagnosis/ICD-10 Code:  Urinary urgency [R39.15]  Referring practitioner: Marcio Bojorquez DO  Date of Initial Visit: Type: THERAPY  Noted: 2021  Today's Date: 2021  Patient seen for 7 sessions      Subjective   Etelvina Prescott reports feeling discouraged. Last few days had more cramping in lower abdominal area. Not good about doing her HEP while she was gone. Not sure if just not noticing it during the day. Feels like period cramp. Did some more walking than usually does and sitting for driving to [place].       Pain Rating (0-10): 5      Objective   verbal consent obtained for internal pelvic exam/treatment with declined need for second person in room     See Exercise, Manual, and Modality Logs for complete treatment.       Assessment/Plan  Pt is making good progress. She demonstrates decreasing PFM tension and improving strength. Pt is compliant with HEP and is to continue current exercises at this time as they are still challenging. Will continue manual and progress strengthening as indicated to resolve urinary symptoms and pain.    Progress per Plan of Care         1130/1215   Timed:         Manual Therapy:    37     mins  55430;     Therapeutic Exercise:    8     mins  44310;     Neuromuscular Ja:    0    mins  79102;    Therapeutic Activity:     0     mins  65410;     Gait Trainin     mins  61512;     Ultrasound:     0     mins  08374;    Ionto                               0    mins   94350  Self Care                       0     mins   88975  Canalith Repos               0    mins  48685    Un-Timed:  Electrical Stimulation:    0     mins  92018 ( );  Dry Needling     0     mins self-pay  Traction     0     mins 55989  Low Eval     0     Mins  30583  Mod Eval     0     Mins  12335  High Eval                       0     Mins  31713  Re-Eval                           0    mins  41881    Timed  Treatment:   45   mins   Total Treatment:     45   mins    Kristian Jaquez, PT  [place] License # [id number]  Physical Therapist

## 2021-06-09 PROCEDURE — 87086 URINE CULTURE/COLONY COUNT: CPT | Performed by: NURSE PRACTITIONER

## 2021-06-10 ENCOUNTER — TELEPHONE (OUTPATIENT)
Dept: FAMILY MEDICINE CLINIC | Facility: CLINIC | Age: 66
End: 2021-06-10

## 2021-06-10 ENCOUNTER — TREATMENT (OUTPATIENT)
Dept: PHYSICAL THERAPY | Facility: CLINIC | Age: 66
End: 2021-06-10

## 2021-06-10 DIAGNOSIS — R10.2 PELVIC PAIN: ICD-10-CM

## 2021-06-10 DIAGNOSIS — N94.89 HIGH-TONE PELVIC FLOOR DYSFUNCTION: ICD-10-CM

## 2021-06-10 DIAGNOSIS — R39.15 URINARY URGENCY: Primary | ICD-10-CM

## 2021-06-10 PROCEDURE — 97110 THERAPEUTIC EXERCISES: CPT | Performed by: PHYSICAL THERAPIST

## 2021-06-10 PROCEDURE — 97140 MANUAL THERAPY 1/> REGIONS: CPT | Performed by: PHYSICAL THERAPIST

## 2021-06-10 NOTE — TELEPHONE ENCOUNTER
Caller: Etelvina Prescott    Relationship: Self    Best call back number: [phone]    Who is your current provider: DR. MARY ANNE CHENG     Who would you like your new provider to be: DR. DENNISE PRESTON     What are your reasons for transferring care: PATIENT WANT'S A FEMALE DOCTOR     Additional notes: PATIENT WOULD LIKE A CALL BACK TO DISCUSS A TIME THAT SHE CAN BE SEEN

## 2021-06-10 NOTE — PROGRESS NOTES
Re-Assessment / Re-Certification    Patient: Etelvina Prescott   : 1955  Diagnosis/ICD-10 Code:  Urinary urgency [R39.15]  Referring practitioner: Marcio Bojorquez DO  Date of Initial Visit: Type: THERAPY  Noted: 2021  Today's Date: 6/10/2021  Patient seen for 8 sessions      Subjective:   Etelvina Prescott reports: doing okay. Not having to go as often. On trip she wasn't having to stop as much to go to the bathroom. Control feels it is better. Has had more low pelvic pain in the last two days. Increased back pain, went to urgent care to check for UTI and didn't have it. Was also having nausea and medication for that helped. Started medication for IBS again for cramping. Seemed to help a little. Rates improvement with pain about 50% and 70% with urinary symptoms. Able to wait 3 hours without limiting fluids on trip.       Subjective Questionnaire: PFDI 20  Clinical Progress: improved  Home Program Compliance: Yes  Treatment has included: therapeutic exercise, neuromuscular re-education, manual therapy and therapeutic activity      OBJECTIVE:   verbal consent obtained for internal pelvic exam/treatment with declined need for second person in room     Palpation:   Supine:   Abdominals - mild tension B;  Iliacus, psoas - mild tension B  Adductors: mild tension B proximally        Internal:   Sensation: intact  Bulge: incomplete  Knack: weak  Wall Laxity: mild anterior                 L/R supf mm: mild tension B              Levator ani: mild tension B              Obturator internus: mild B              Compressor urethra: mild B     PERF SCALE:  Power: 3     Endurance: 8     Repetitions: 5     Fast twitch: 4        See flowsheet for details of treatment following reassessment           Assessment/Plan:   Pt is making good progress and has met 3/3 short term goals and 2/5 long term goals at this time. She demonstrates decreasing PFM tension and improving strength. Pt is compliant with HEP and this was  progressed to include resistance in standing today. Will continue manual and progress strengthening as indicated to resolve urinary symptoms and pain.     Goals:   STG's: 4 weeks  · Patient will report > 25% reduction in overall pain - met  · Patient will report > 25% improvement in urinary symptoms - met  · Patient will be able to perform HEP with minimal verbal cues - met     LTG's: By discharge  · Patient will report a decrease in pain to < 1/10 - in progress   · Patient will report >75% improvement in urinary symptoms   · Patient will report an elimination of urinary urgency - met  · Patient will be able to tolerate sitting >90 minutes without increased pain   · Patient will decrease voiding frequency to once every 3-4 hours - met  ·   Patient will be independent with HEP           Recommendations: Continue as planned  Timeframe: 6 weeks  Prognosis to achieve goals: good    PT Signature: Kristian Jaquez PT      Based upon review of the patient's progress and continued therapy plan, it is my medical opinion that Etelvina Prescott should continue physical therapy treatment at [facility] PH[facility] TH[facility]AP[facility]  [address] [place] [id number] [phone].    Signature: __________________________________  Marcio Bojorquez,     1107/1145  Manual Therapy:    30     mins  89224;  Therapeutic Exercise:    8     mins  76722;     Neuromuscular Ja:    0    mins  29187;    Therapeutic Activity:     0     mins  10515;     Gait Trainin     mins  15045;     Ultrasound:     0     mins  27136;    Electrical Stimulation:    0     mins  79850 ( );  Dry Needling     0     mins self-pay    Timed Treatment:  38   mins   Total Treatment:     38   mins

## 2021-06-11 ENCOUNTER — OFFICE VISIT (OUTPATIENT)
Dept: FAMILY MEDICINE CLINIC | Facility: CLINIC | Age: 66
End: 2021-06-11

## 2021-06-11 ENCOUNTER — LAB (OUTPATIENT)
Dept: LAB | Facility: HOSPITAL | Age: 66
End: 2021-06-11

## 2021-06-11 ENCOUNTER — TELEPHONE (OUTPATIENT)
Dept: URGENT CARE | Facility: CLINIC | Age: 66
End: 2021-06-11

## 2021-06-11 VITALS
OXYGEN SATURATION: 95 % | HEART RATE: 86 BPM | HEIGHT: 64 IN | TEMPERATURE: 95.9 F | SYSTOLIC BLOOD PRESSURE: 126 MMHG | BODY MASS INDEX: 22.71 KG/M2 | DIASTOLIC BLOOD PRESSURE: 82 MMHG | WEIGHT: 133 LBS

## 2021-06-11 DIAGNOSIS — R39.15 URINARY URGENCY: ICD-10-CM

## 2021-06-11 DIAGNOSIS — R10.9 STOMACH ACHE: ICD-10-CM

## 2021-06-11 DIAGNOSIS — R14.2 FLATULENCE, ERUCTATION, AND GAS PAIN: ICD-10-CM

## 2021-06-11 DIAGNOSIS — Z13.220 SCREENING FOR CHOLESTEROL LEVEL: ICD-10-CM

## 2021-06-11 DIAGNOSIS — R14.1 FLATULENCE, ERUCTATION, AND GAS PAIN: ICD-10-CM

## 2021-06-11 DIAGNOSIS — M79.10 MYALGIA: Primary | ICD-10-CM

## 2021-06-11 DIAGNOSIS — R14.3 FLATULENCE, ERUCTATION, AND GAS PAIN: ICD-10-CM

## 2021-06-11 DIAGNOSIS — Z13.220 SCREENING FOR LIPOID DISORDERS: ICD-10-CM

## 2021-06-11 DIAGNOSIS — R10.9 ABDOMINAL CRAMPING: ICD-10-CM

## 2021-06-11 DIAGNOSIS — K90.89 OTHER INTESTINAL MALABSORPTION: ICD-10-CM

## 2021-06-11 DIAGNOSIS — R14.2 BURPING: ICD-10-CM

## 2021-06-11 DIAGNOSIS — K90.89 OTHER SPECIFIED INTESTINAL MALABSORPTION: ICD-10-CM

## 2021-06-11 PROBLEM — H61.309 ACQUIRED STENOSIS OF EXTERNAL EAR CANAL: Status: ACTIVE | Noted: 2019-06-07

## 2021-06-11 PROBLEM — H60.60 CHRONIC OTITIS EXTERNA: Status: ACTIVE | Noted: 2018-05-18

## 2021-06-11 PROCEDURE — 99214 OFFICE O/P EST MOD 30 MIN: CPT | Performed by: NURSE PRACTITIONER

## 2021-06-11 NOTE — PROGRESS NOTES
"Chief Complaint  Pelvic Cramping (Pt states she has been having some pelvic cramping and she is going to pelvic floor cramping but is concerned that it is not getting better. )    Subjective          Etelvina Prescott presents to [facility]     History of Present Illness  This past year, she hasn't felt right.  2 years ago she had a collapsed breast implant and they reinserted new ones.  She feels like things have gone downhill since then.  She was not worried about having COVID.  She did everything that she was supposed to.  Over the past 6 months, she has felt nausea, cramping, and burping.  She doesn't remember the last time she talked to Dr. Bojorquez about this.  He had mentioned pelvic floor therapy.  It is going well, but it isn't taking away the cramping.  She just wants to feel better.  She saw [facility] in the last 6 months.  They did an US that was normal.  She goes yearly for her exams.      Dr. Bojorquez had mentioned cutting back the Zoloft to 50mg, but she doesn't feel like it is really helping.  She doesn't feel like getting out of bed.  She saw [facility] last year.  They told her that her Citalopram was no longer effective.  She then developed palpitations.  She had a heart evaluation that was negative.  She was then started on Zoloft.    Today is the best she has felt.      Objective   Vital Signs:   /82   Pulse 86   Temp 95.9 °F (35.5 °C)   Ht 162.6 cm (64\")   Wt 60.3 kg (133 lb)   SpO2 95%   BMI 22.83 kg/m²       Physical Exam  Vitals reviewed.   Constitutional:       Appearance: Normal appearance. She is normal weight.   HENT:      Head: Normocephalic and atraumatic.      Right Ear: Tympanic membrane, ear canal and external ear normal.      Left Ear: Tympanic membrane, ear canal and external ear normal.      Nose: Nose normal.      Mouth/Throat:      Mouth: Mucous membranes are moist.      Pharynx: Oropharynx is clear.   Cardiovascular: "      Rate and Rhythm: Normal rate and regular rhythm.      Pulses: Normal pulses.      Heart sounds: Normal heart sounds.   Pulmonary:      Effort: Pulmonary effort is normal.      Breath sounds: Normal breath sounds.   Abdominal:      General: Bowel sounds are normal.      Palpations: Abdomen is soft.      Tenderness: There is abdominal tenderness ( diffuse lower abd). There is no guarding or rebound.   Skin:     General: Skin is warm and dry.   Neurological:      General: No focal deficit present.      Mental Status: She is alert and oriented to person, place, and time.   Psychiatric:         Mood and Affect: Mood normal.         Behavior: Behavior normal.         Thought Content: Thought content normal.         Judgment: Judgment normal.        Result Review :                 Assessment and Plan    Diagnoses and all orders for this visit:    1. Myalgia (Primary) -pt has not felt right for a while now and has had evaluations for her symptoms, but nothing abnormal has been found.  It is possible that her symptoms are being caused by breast implant illness as she does feel like her symptoms started after her new implants were inserted.  Will evaluate other possible causes, included rheumatological problems that could be causing her symptoms.  --Labs today.  -     Cancel: CBC & Differential  -     Cancel: Comprehensive Metabolic Panel  -     Cancel: TSH Rfx On Abnormal To Free T4  -     Cancel: Vitamin D 25 Hydroxy  -     Cancel: C-reactive protein  -     Cancel: ROQUE  -     Cancel: Rheumatoid Factor    2. Abdominal cramping -pt has been seen by urology and [facility].  No problems were found that would explain her symptoms.  As mentioned above, will further evaluate this.  --Referral placed to GI.  --H pylori breath test.  --Pt has not been taking the Famotidine, but if she is having symptoms did encourage her to start taking it 20mg BID.  -     Ambulatory Referral to Gastroenterology  -     Cancel: H. Pylori  Breath Test - Breath, Lung    3. Screening for cholesterol level  -     Cancel: Lipid Panel    4. Burping -possibly caused by GERD or h pylori.  --Discussed restarting Famotidine 20mg BID.  --H Pylori breath test ordered.    -     Cancel: H. Pylori Breath Test - Breath, Lung    5. Other intestinal malabsorption   -     Cancel: Vitamin D 25 Hydroxy        Follow Up   Return if symptoms worsen or fail to improve.  Patient was given instructions and counseling regarding her condition or for health maintenance advice. Please see specific information pulled into the AVS if appropriate.

## 2021-06-11 NOTE — TELEPHONE ENCOUNTER
I'm okay with this although sad to see her go as she is a very pleasant patient. Many of her visits recently have been urogyn related so this is understandable.

## 2021-06-17 ENCOUNTER — TREATMENT (OUTPATIENT)
Dept: PHYSICAL THERAPY | Facility: CLINIC | Age: 66
End: 2021-06-17

## 2021-06-17 DIAGNOSIS — R39.15 URINARY URGENCY: Primary | ICD-10-CM

## 2021-06-17 DIAGNOSIS — N94.89 HIGH-TONE PELVIC FLOOR DYSFUNCTION: ICD-10-CM

## 2021-06-17 DIAGNOSIS — R10.2 PELVIC PAIN: ICD-10-CM

## 2021-06-17 PROCEDURE — 97140 MANUAL THERAPY 1/> REGIONS: CPT | Performed by: PHYSICAL THERAPIST

## 2021-06-17 PROCEDURE — 97110 THERAPEUTIC EXERCISES: CPT | Performed by: PHYSICAL THERAPIST

## 2021-06-17 NOTE — PROGRESS NOTES
Physical Therapy Daily Progress Note  Patient: Etelvina Prescott   : 1955  Diagnosis/ICD-10 Code:  Urinary urgency [R39.15]  Referring practitioner: Marcio Bojorquez DO  Date of Initial Visit: Type: THERAPY  Noted: 2021  Today's Date: 2021  Patient seen for 9 sessions      Subjective   Etelvina Prescott reports she went and saw her new doctor and saw PA and had a good visit on Friday. Has appointment with Dr. Ramirez when comes back to [facility]. Has appointment with GI MD. Has felt better the last 2 days than she has felt in a long time.        Pain Rating (0-10): 0      Objective   verbal consent obtained for internal pelvic exam/treatment with declined need for second person in room     See Exercise, Manual, and Modality Logs for complete treatment.       Assessment/Plan  Pt compliant with HEP and has had significant improvement in pain and bladder symptoms. She tolerated manual with mild discomfort. Will see Pt every 2 weeks at this time and likely discharge in next 1-2 visits pending continued progress.     Progress per Plan of Care         1102/1145   Timed:         Manual Therapy:    32     mins  56929;     Therapeutic Exercise:    8     mins  76595;     Neuromuscular Ja:    0    mins  13628;    Therapeutic Activity:     0     mins  27239;     Gait Trainin     mins  93794;     Ultrasound:     0     mins  92057;    Ionto                               0    mins   43478  Self Care                       0     mins   88401  Canalith Repos               0    mins  24263    Un-Timed:  Electrical Stimulation:    0     mins  41562 (MC );  Dry Needling     0     mins self-pay  Traction     0     mins 54928  Low Eval     0     Mins  47299  Mod Eval     0     Mins  37530  High Eval                       0     Mins  58898  Re-Eval                           00    mins  07534    Timed Treatment:   40   mins   Total Treatment:     40   mins    Kristian Jaquez PT  [place] License # [id number]  Physical  Therapist

## 2021-06-28 ENCOUNTER — OFFICE VISIT (OUTPATIENT)
Dept: FAMILY MEDICINE CLINIC | Facility: CLINIC | Age: 66
End: 2021-06-28

## 2021-06-28 VITALS
HEIGHT: 64 IN | BODY MASS INDEX: 22.71 KG/M2 | SYSTOLIC BLOOD PRESSURE: 142 MMHG | DIASTOLIC BLOOD PRESSURE: 70 MMHG | WEIGHT: 133 LBS | OXYGEN SATURATION: 98 % | HEART RATE: 107 BPM

## 2021-06-28 DIAGNOSIS — F33.1 MAJOR DEPRESSIVE DISORDER, RECURRENT EPISODE, MODERATE DEGREE (HCC): ICD-10-CM

## 2021-06-28 DIAGNOSIS — R11.0 CHRONIC NAUSEA: ICD-10-CM

## 2021-06-28 DIAGNOSIS — F41.8 SITUATIONAL ANXIETY: Primary | ICD-10-CM

## 2021-06-28 DIAGNOSIS — M70.61 TROCHANTERIC BURSITIS OF RIGHT HIP: ICD-10-CM

## 2021-06-28 PROBLEM — Z85.3 H/O MALIGNANT NEOPLASM OF BREAST: Status: ACTIVE | Noted: 2021-06-28

## 2021-06-28 PROCEDURE — 99214 OFFICE O/P EST MOD 30 MIN: CPT | Performed by: FAMILY MEDICINE

## 2021-06-28 RX ORDER — HYDROXYZINE HYDROCHLORIDE 25 MG/1
12.5-25 TABLET, FILM COATED ORAL EVERY 6 HOURS PRN
Qty: 60 TABLET | Refills: 3 | Status: SHIPPED | OUTPATIENT
Start: 2021-06-28 | End: 2022-02-28 | Stop reason: SDUPTHER

## 2021-06-28 RX ORDER — METHYLPREDNISOLONE 4 MG/1
TABLET ORAL
Qty: 1 EACH | Refills: 0 | Status: SHIPPED | OUTPATIENT
Start: 2021-06-28 | End: 2021-08-03

## 2021-06-28 NOTE — PROGRESS NOTES
"Chief Complaint  Establish Care (provider change ) and Hip Pain (rt hip pain x2 months no injury noted)    Subjective          Etelvina Prescott presents to [facility]  History of Present Illness     Two years ago had severe UTI, she kept getting them. She saw urology and had cystoscopy and ultrasound. She was put on antibiotics for 3 weeks. She thought she had another UTI a couple times. She has a lot of lower pelvic pain. Last Summer GYN had ultrasound and appeared ok. She had additional tests with last PCP. She is finishing up pelvic PT. She feels the need to urinate often. She can hold it a little better. Pain has improved. She has lower pelvic cramping sitting watching tv or riding in a car.     She feels nausea off and on for a long time. She had dicyclomine as needed for cramping, but she doesn't feel she has heartburn. She burps often, not as much flatus. She tried increased pepcid.     She cut back on zoloft to 50mg. And now back on 100mg.     She has tried to cut nuts out of her diet. She also cut out coffee.     Ibuprofen makes her pain better.     She has been in PT for her back.     Her heart was racing and wore monitor over a year ago. She used to take 3 different antidepressants, but now on zoloft. 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 She has a thud in her stomach. She had prescription for oxazepam years ago and     Sarcoidosis has never given her problems.     Implants had to be replaced 2 years ago from collapse. She is not comfortable with these.     She has right hip pain that makes it difficult to sleep at night.  She previously had left hip injection which was helpful and is requesting another injection.      Objective   Vital Signs:   /70   Pulse 107   Ht 162.6 cm (64\")   Wt 60.3 kg (133 lb)   SpO2 98%   BMI 22.83 kg/m²     Physical Exam  Vitals reviewed.   Constitutional:       General: She is not in acute distress.     Appearance: " She is not ill-appearing.   Cardiovascular:      Rate and Rhythm: Normal rate and regular rhythm.   Pulmonary:      Effort: Pulmonary effort is normal.      Breath sounds: Normal breath sounds.   Abdominal:      General: There is no distension.      Palpations: Abdomen is soft.      Tenderness: There is no guarding or rebound.   Musculoskeletal:      Right hip: Bony tenderness ( Greater trochanter) present.   Neurological:      Mental Status: She is alert.   Psychiatric:         Mood and Affect: Mood is anxious.        Result Review :            CT Abdomen Pelvis With & Without Contrast (01/14/2021 12:19)      LABORATORY - SCAN - COMP MULTIPLE [facility] (06/11/2021)    Assessment and Plan    Diagnoses and all orders for this visit:    1. Situational anxiety (Primary)  -     hydrOXYzine (ATARAX) 25 MG tablet; Take 0.5-1 tablets by mouth Every 6 (Six) Hours As Needed for Anxiety.  Dispense: 60 tablet; Refill: 3  Discussed with patient I do not prescribe benzodiazepines.  She could call previous psychiatry office to follow-up on medications and start counseling.  Hydroxyzine as needed.  2. Major depressive disorder, recurrent episode, moderate degree (CMS/HCC)  Continue sertraline.  3. Chronic nausea  Recommend keeping appointment with gastroenterology.  Discussed food diary as well as handout with low FODMAP to see if there are any dietary triggers.  Continue acid.  4. Trochanteric bursitis of right hip  -     methylPREDNISolone (MEDROL) 4 MG dose pack; Take as directed on package instructions.  Dispense: 1 each; Refill: 0  -     Ambulatory Referral to Orthopedic Surgery  [place] with patient I cannot injections hips may need to see orthopedics or sports medicine.  Medrol Dosepak provided and referral to orthopedics.      Follow Up   Return in about 31 days (around 7/29/2021) for Medicare Wellness.  Patient was given instructions and counseling regarding her condition or for health maintenance advice. Please see specific  information pulled into the AVS if appropriate.     Electronically signed by Celina Villegas MD, 06/28/21, 3:47 PM EDT.

## 2021-06-28 NOTE — PATIENT INSTRUCTIONS
My Fitness Pal smart phone lacey      Low-FODMAP Eating Plan    FODMAPs (fermentable oligosaccharides, disaccharides, monosaccharides, and polyols) are sugars that are hard for some people to digest. A low-FODMAP eating plan may help some people who have bowel (intestinal) diseases to manage their symptoms.  This meal plan can be complicated to follow. Work with a diet and nutrition specialist (dietitian) to make a low-FODMAP eating plan that is right for you. A dietitian can make sure that you get enough nutrition from this diet.  What are tips for following this plan?  Reading food labels  · Check labels for hidden FODMAPs such as:  ? High-fructose syrup.  ? Honey.  ? Agave.  ? Natural fruit flavors.  ? Onion or garlic powder.  · Choose low-FODMAP foods that contain 3-4 grams of fiber per serving.  · Check food labels for serving sizes. Eat only one serving at a time to make sure FODMAP levels stay low.  Meal planning  · Follow a low-FODMAP eating plan for up to 6 weeks, or as told by your health care provider or dietitian.  · To follow the eating plan:  1. Eliminate high-FODMAP foods from your diet completely.  2. Gradually reintroduce high-FODMAP foods into your diet one at a time. Most people should wait a few days after introducing one high-FODMAP food before they introduce the next high-FODMAP food. Your dietitian can recommend how quickly you may reintroduce foods.  3. Keep a daily record of what you eat and drink, and make note of any symptoms that you have after eating.  4. Review your daily record with a dietitian regularly. Your dietitian can help you identify which foods you can eat and which foods you should avoid.  General tips  · Drink enough fluid each day to keep your urine pale yellow.  · Avoid processed foods. These often have added sugar and may be high in FODMAPs.  · Avoid most dairy products, whole grains, and sweeteners.  · Work with a dietitian to make sure you get enough fiber in your  "diet.  Recommended foods  Grains  · Gluten-free grains, such as rice, oats, buckwheat, quinoa, corn, polenta, and millet. Gluten-free pasta, bread, or cereal. Rice noodles. Corn tortillas.  Vegetables  · Eggplant, zucchini, cucumber, peppers, green beans, [place] sprouts, bean sprouts, lettuce, arugula, kale, Swiss chard, spinach, damien greens, bok juwan, summer squash, potato, and tomato. Limited amounts of corn, carrot, and sweet potato. Green parts of scallions.  Fruits  · Bananas, oranges, estelle, limes, blueberries, raspberries, strawberries, grapes, cantaloupe, honeydew melon, kiwi, papaya, passion fruit, and pineapple. Limited amounts of dried cranberries, banana chips, and shredded coconut.  Dairy  · Lactose-free milk, yogurt, and kefir. Lactose-free cottage cheese and ice cream. Non-dairy milks, such as almond, coconut, hemp, and rice milk. Yogurts made of non-dairy milks. Limited amounts of goat cheese, brie, mozzarella, parmesan, swiss, and other hard cheeses.  Meats and other protein foods  · Unseasoned beef, pork, poultry, or fish. Eggs. Maharaj. Tofu (firm) and tempeh. Limited amounts of nuts and seeds, such as almonds, walnuts, brazil nuts, pecans, peanuts, pumpkin seeds, aravind seeds, and sunflower seeds.  Fats and oils  · Butter-free spreads. Vegetable oils, such as olive, canola, and sunflower oil.  Seasoning and other foods  · Artificial sweeteners with names that do not end in \"ol\" such as aspartame, saccharine, and stevia. Maple syrup, white table sugar, raw sugar, brown sugar, and molasses. Fresh basil, coriander, parsley, rosemary, and thyme.  Beverages  · Water and mineral water. Sugar-sweetened soft drinks. Small amounts of orange juice or cranberry juice. Black and green tea. Most dry kyle. Coffee.  This may not be a complete list of low-FODMAP foods. Talk with your dietitian for more information.  Foods to avoid  Grains  · Wheat, including kamut, durum, and semolina. Barley and bulgur. " Couscous. Wheat-based cereals. Wheat noodles, bread, crackers, and pastries.  Vegetables  · Chicory root, artichoke, asparagus, cabbage, snow peas, sugar snap peas, mushrooms, and cauliflower. Onions, garlic, leeks, and the white part of scallions.  Fruits  · Fresh, dried, and juiced forms of apple, pear, watermelon, peach, plum, cherries, apricots, blackberries, boysenberries, figs, nectarines, and jani. Avocado.  Dairy  · Milk, yogurt, ice cream, and soft cheese. Cream and sour cream. Milk-based sauces. Custard.  Meats and other protein foods  · Fried or fatty meat. Sausage. Cashews and pistachios. Soybeans, baked beans, black beans, chickpeas, kidney beans, vy beans, navy beans, lentils, and split peas.  Seasoning and other foods  · Any sugar-free gum or candy. Foods that contain artificial sweeteners such as sorbitol, mannitol, isomalt, or xylitol. Foods that contain honey, high-fructose corn syrup, or agave. Bouillon, vegetable stock, beef stock, and chicken stock. Garlic and onion powder. Condiments made with onion, such as hummus, chutney, pickles, relish, salad dressing, and salsa. Tomato paste.  Beverages  · Chicory-based drinks. Coffee substitutes. Chamomile tea. Fennel tea. Sweet or fortified kyle such as port or prabhu. Diet soft drinks made with isomalt, mannitol, maltitol, sorbitol, or xylitol. Apple, pear, and jani juice. Juices with high-fructose corn syrup.  This may not be a complete list of high-FODMAP foods. Talk with your dietitian to discuss what dietary choices are best for you.   Summary  · A low-FODMAP eating plan is a short-term diet that eliminates FODMAPs from your diet to help ease symptoms of certain bowel diseases.  · The eating plan usually lasts up to 6 weeks. After that, high-FODMAP foods are restarted gradually, one at a time, so you can find out which may be causing symptoms.  · A low-FODMAP eating plan can be complicated. It is best to work with a dietitian who has  experience with this type of plan.  This information is not intended to replace advice given to you by your health care provider. Make sure you discuss any questions you have with your health care provider.  Document Revised: 11/30/2018 Document Reviewed: 08/14/2018  Elsevier Patient Education © 2021 [employer]

## 2021-07-01 ENCOUNTER — TREATMENT (OUTPATIENT)
Dept: PHYSICAL THERAPY | Facility: CLINIC | Age: 66
End: 2021-07-01

## 2021-07-01 DIAGNOSIS — N94.89 HIGH-TONE PELVIC FLOOR DYSFUNCTION: ICD-10-CM

## 2021-07-01 DIAGNOSIS — R10.2 PELVIC PAIN: ICD-10-CM

## 2021-07-01 DIAGNOSIS — R39.15 URINARY URGENCY: Primary | ICD-10-CM

## 2021-07-01 PROCEDURE — 97110 THERAPEUTIC EXERCISES: CPT | Performed by: PHYSICAL THERAPIST

## 2021-07-01 PROCEDURE — 97140 MANUAL THERAPY 1/> REGIONS: CPT | Performed by: PHYSICAL THERAPIST

## 2021-07-01 NOTE — PROGRESS NOTES
Physical Therapy Daily Progress Note  Patient: Etelvina Prescott   : 1955  Diagnosis/ICD-10 Code:  Urinary urgency [R39.15]  Referring practitioner: No ref. provider found  Date of Initial Visit: Type: THERAPY  Noted: 2021  Today's Date: 2021  Patient seen for 10 sessions      Subjective   Etelvina Prescott reports saw PCP Monday and is going to keep GI appointment. Thinks may be getting colonoscopy.   Some abdominal cramping here and there occasional. On trip last week and did well with bladder and not having to stop as much.       Pain Rating (0-10): 0      Objective   verbal consent obtained for internal pelvic exam/treatment with declined need for second person in room     See Exercise, Manual, and Modality Logs for complete treatment.         Assessment/Plan  Pt is doing well with decreased urgency and frequency of urination and she has had minimal abdominal cramping. She is investigating GI component of this cramping. She is compliant with HEP. Pt to continue current Hep at this time and will be placed on hold through August to attempt to maintain current status on own without need for further PT.  Will discharge at that time should she not need to return.     Progress per Plan of Care         1100/1140   Timed:         Manual Therapy:    32     mins  07250;     Therapeutic Exercise:    8     mins  15958;     Neuromuscular Ja:    0    mins  99165;    Therapeutic Activity:     0     mins  63433;     Gait Trainin     mins  06561;     Ultrasound:     0     mins  81078;    Ionto                               00    mins   21740  Self Care                       0     mins   66096  Canalith Repos               0    mins  35819    Un-Timed:  Electrical Stimulation:    0     mins  46262 ( );  Dry Needling     0     mins self-pay  Traction     0     mins 44138  Low Eval     0     Mins  98889  Mod Eval     0     Mins  57651  High Eval                       0     Mins  31790  Re-Eval                            0    mins  90296    Timed Treatment:   40   mins   Total Treatment:     40   mins    Kristian Jaquez, PT  [place] License # [id number]  Physical Therapist

## 2021-07-12 RX ORDER — ENALAPRIL MALEATE 20 MG/1
TABLET ORAL
Qty: 90 TABLET | Refills: 1 | Status: SHIPPED | OUTPATIENT
Start: 2021-07-12 | End: 2021-12-21 | Stop reason: SDUPTHER

## 2021-07-13 ENCOUNTER — OFFICE VISIT (OUTPATIENT)
Dept: ORTHOPEDIC SURGERY | Facility: CLINIC | Age: 66
End: 2021-07-13

## 2021-07-13 VITALS
WEIGHT: 132.94 LBS | HEART RATE: 82 BPM | BODY MASS INDEX: 22.7 KG/M2 | DIASTOLIC BLOOD PRESSURE: 64 MMHG | SYSTOLIC BLOOD PRESSURE: 157 MMHG | HEIGHT: 64 IN

## 2021-07-13 DIAGNOSIS — M25.551 RIGHT HIP PAIN: ICD-10-CM

## 2021-07-13 DIAGNOSIS — M70.61 TROCHANTERIC BURSITIS OF RIGHT HIP: Primary | ICD-10-CM

## 2021-07-13 DIAGNOSIS — M16.0 PRIMARY OSTEOARTHRITIS OF BOTH HIPS: ICD-10-CM

## 2021-07-13 PROCEDURE — 99204 OFFICE O/P NEW MOD 45 MIN: CPT | Performed by: ORTHOPAEDIC SURGERY

## 2021-07-13 PROCEDURE — 20610 DRAIN/INJ JOINT/BURSA W/O US: CPT | Performed by: ORTHOPAEDIC SURGERY

## 2021-07-13 RX ORDER — BUPIVACAINE HYDROCHLORIDE 2.5 MG/ML
3 INJECTION, SOLUTION EPIDURAL; INFILTRATION; INTRACAUDAL
Status: COMPLETED | OUTPATIENT
Start: 2021-07-13 | End: 2021-07-13

## 2021-07-13 RX ORDER — TRIAMCINOLONE ACETONIDE 40 MG/ML
80 INJECTION, SUSPENSION INTRA-ARTICULAR; INTRAMUSCULAR
Status: COMPLETED | OUTPATIENT
Start: 2021-07-13 | End: 2021-07-13

## 2021-07-13 RX ORDER — LIDOCAINE HYDROCHLORIDE 10 MG/ML
3 INJECTION, SOLUTION EPIDURAL; INFILTRATION; INTRACAUDAL; PERINEURAL
Status: COMPLETED | OUTPATIENT
Start: 2021-07-13 | End: 2021-07-13

## 2021-07-13 RX ADMIN — TRIAMCINOLONE ACETONIDE 80 MG: 40 INJECTION, SUSPENSION INTRA-ARTICULAR; INTRAMUSCULAR at 10:58

## 2021-07-13 RX ADMIN — BUPIVACAINE HYDROCHLORIDE 3 ML: 2.5 INJECTION, SOLUTION EPIDURAL; INFILTRATION; INTRACAUDAL at 10:58

## 2021-07-13 RX ADMIN — LIDOCAINE HYDROCHLORIDE 3 ML: 10 INJECTION, SOLUTION EPIDURAL; INFILTRATION; INTRACAUDAL; PERINEURAL at 10:58

## 2021-07-13 NOTE — PROGRESS NOTES
Orthopaedic Clinic Note: Hip New Patient    Chief Complaint   Patient presents with   • Right Hip - Pain        HPI  Consult from: Celina Braswell*    Etelvina Prescott is a 66 y.o. female who presents with right hip pain for 3 month(s). Onset atraumatic and gradual in nature. Pain is localized to gluteal region and is a 6/10 on the pain scale.Pain is described as aching. Associated symptoms include pain.  The pain is worse with standing and sitting; nothing helps improve the pain. Previous treatments have included: NSAIDS, physical therapy and oral steroids since symptom onset. Although some transient relief was reported with these interventions, these conservative measures have failed and symptoms have persisted. The patient is limited in daily activities and has had a significant decrease in quality of life as a result. She denies fevers, chills, or constitutional symptoms.    I have reviewed the following portions of the patient's history:History of Present Illness    Past Medical History:   Diagnosis Date   • Allergic rhinitis    • Breast cancer (CMS/HCC) 2010    Right -Taxotere and Cytoxan ×4.  Tamoxifen.  Mastectomy   • Breast cancer (CMS/HCC)    • Chronic anxiety Adulthood    Good response to citalopram   • Chronic constipation Adulthood    MOM or MiraLAX   • Chronic fatigue    • Depression 1990    Good response to amitriptyline and citalopram   • Diverticulosis 2017    Per colonoscopy   • Dry eye syndrome 2010    Moisturizers   • Heart murmur Mitrovalve   • Herpes zoster    • History of exposure to tuberculosis Remote    PPDs 1993 onward all negative   • Hypertension 2006   • Impacted cerumen    • Irritable bowel syndrome    • MVP (mitral valve prolapse) 1985    Recurring tachycardias   • Nephrolithiasis 1985   • Plantar fasciitis    • Sarcoidosis 2003    Active in eyes and lungs   • Sleep disturbances 1990    Good response to amitriptyline and trazodone   • Uterine bleeding 2006    Uterine  ablation for bleeding and chronic iron deficiency      Past Surgical History:   Procedure Laterality Date   • BREAST BIOPSY Right     Benign   •  SECTION   &    • COLONOSCOPY  ,     Diverticulosis only.   • COLONOSCOPY W/ POLYPECTOMY  Remote   • DILATATION AND CURETTAGE     • ENDOMETRIAL ABLATION     • HYSTEROSCOPY ENDOMETRIAL ABLATION      persistant bleeding   • MASTECTOMY Bilateral 2010    bilateral with implants, breast cancer on the right      Family History   Problem Relation Age of Onset   • Alzheimer's disease Mother          age 89   • Hypothyroidism Mother    • Osteoporosis Mother    • Pulmonary embolism Mother    • Coronary artery disease Father    • Dementia Father    • Diabetes Father    • Hypertension Father    • Heart attack Father    • Hypothyroidism Sister    • Hypertension Sister    • Breast cancer Maternal Grandmother    • Stomach cancer Paternal Aunt    • Lung cancer Paternal Aunt      Social History     Socioeconomic History   • Marital status:      Spouse name: Woody   • Number of children: Not on file   • Years of education: Not on file   • Highest education level: Not on file   Tobacco Use   • Smoking status: Never Smoker   • Smokeless tobacco: Never Used   Vaping Use   • Vaping Use: Never used   Substance and Sexual Activity   • Alcohol use: Yes     Alcohol/week: 5.0 standard drinks     Types: 5 Glasses of wine per week   • Drug use: No   • Sexual activity: Not Currently     Partners: Male      Current Outpatient Medications on File Prior to Visit   Medication Sig Dispense Refill   • cycloSPORINE (RESTASIS) 0.05 % ophthalmic emulsion Apply 1 drop to eye Every 12 (Twelve) Hours.     • diclofenac (VOLTAREN) 1 % gel gel Apply 4 g topically to the appropriate area as directed 4 (Four) Times a Day As Needed (hip pain). 100 g 1   • dicyclomine (BENTYL) 20 MG tablet Take 1 tablet by mouth Every 6 (Six) Hours. 120 tablet 11   • enalapril (VASOTEC) 20 MG  "tablet TAKE 1/2 TABLET TWICE DAILY 90 tablet 1   • fexofenadine (ALLEGRA) 180 MG tablet Take 1 tablet by mouth Daily As Needed.     • hydrOXYzine (ATARAX) 25 MG tablet Take 0.5-1 tablets by mouth Every 6 (Six) Hours As Needed for Anxiety. 60 tablet 3   • ibuprofen (ADVIL,MOTRIN) 400 MG tablet Take 1 tablet by mouth 2 (Two) Times a Day As Needed for Mild Pain . 180 tablet 1   • methylPREDNISolone (MEDROL) 4 MG dose pack Take as directed on package instructions. 1 each 0   • Multiple Vitamin (MULTIVITAMIN) capsule Take  by mouth daily.     • polyvinyl alcohol-povidone (REFRESH) 1.4-0.6 % ophthalmic solution Apply 1 drop to eye 2 (two) times a day.     • sertraline (ZOLOFT) 100 MG tablet Take 1 tablet by mouth Daily. 90 tablet 3   • traZODone (DESYREL) 100 MG tablet Take 1 tablet by mouth every night at bedtime. 90 tablet 3     No current facility-administered medications on file prior to visit.      Allergies   Allergen Reactions   • Penicillins Rash   • Sulfa Antibiotics Rash        Review of Systems   Constitutional: Negative.    HENT: Negative.    Eyes: Negative.    Respiratory: Negative.    Cardiovascular: Negative.    Gastrointestinal: Negative.    Endocrine: Negative.    Genitourinary: Negative.    Musculoskeletal: Positive for arthralgias.   Skin: Negative.    Allergic/Immunologic: Negative.    Neurological: Negative.    Hematological: Negative.    Psychiatric/Behavioral: Negative.         The patient's Review of Systems was personally reviewed and confirmed as accurate.    The following portions of the patient's history were reviewed and updated as appropriate: allergies, current medications, past family history, past medical history, past social history, past surgical history and problem list.    Physical Exam  Blood pressure 157/64, pulse 82, height 162.6 cm (64.02\"), weight 60.3 kg (132 lb 15 oz), not currently breastfeeding.    Body mass index is 22.81 kg/m².    GENERAL APPEARANCE: awake, alert & oriented " x 3, in no acute distress and well developed, well nourished  PSYCH: normal affect  LUNGS:  breathing nonlabored  EYES: sclera anicteric  CARDIOVASCULAR: palpable dorsalis pedis, palpable posterior tibial bilaterally. Capillary refill less than 2 seconds  EXTREMITIES: no clubbing, cyanosis  GAIT:  Normal           Right Hip Exam:  RANGE OF MOTION:   FLEXION CONTRACTURE: None   FLEXION: 110 degrees   INTERNAL ROTATION: 20 degrees at 90 degrees of flexion   EXTERNAL ROTATION: 40 degrees at 90 degrees of flexion    PAIN WITH HIP MOTION: no  PAIN WITH LOGROLL: no  STINCHFIELD TEST: negative    KNEE EXAM: full knee ROM (0-120 degrees), stable to varus and valgus stress at terminal extension and 30 degrees flexion     STRENGTH:  5/5 hip adduction, abduction, flexion. 5/5 strength knee flexion, extension. 5/5 strength ankle dorsiflexion and plantarflexion.     GREATER TROCHANTER BURSAL PAIN:  yes     REFLEXES:   PATELLAR 2+/4   ACHILLES 2+/4    CLONUS: negative  STRAIGHT LEG TEST:   negative    SENSATION TO LIGHT TOUCH:  DEEP PERONEAL/SUPERFICIAL PERONEAL/SURAL/SAPHENOUS/TIBIAL:  intact    EDEMA:   no  ERYTHEMA:  no  WOUNDS/INCISIONS: no overlying skin problems.      Left Hip Exam:   RANGE OF MOTION:   FLEXION CONTRACTURE: None   FLEXION: 110 degrees   INTERNAL ROTATION: 20 degrees at 90 degrees of flexion   EXTERNAL ROTATION: 40 degrees at 90 degrees of flexion    PAIN WITH HIP MOTION: no  PAIN WITH LOGROLL: no  STINCHFIELD TEST: negative    KNEE EXAM: full knee ROM (0-120 degrees), stable to varus and valgus stress at terminal extension and 30 degrees flexion     STRENGTH:  5/5 hip adduction, abduction, flexion. 5/5 strength knee flexion, extension. 5/5 strength ankle dorsiflexion and plantarflexion.     GREATER TROCHANTER BURSAL PAIN:  no     REFLEXES:   PATELLAR 2+/4   ACHILLES 2+/4    CLONUS: negative  STRAIGHT LEG TEST:   negative    SENSATION TO LIGHT TOUCH:  DEEP PERONEAL/SUPERFICIAL  PERONEAL/SURAL/SAPHENOUS/TIBIAL:  intact    EDEMA:   no  ERYTHEMA:  no  WOUNDS/INCISIONS: no overlying skin problems.      ------------------------------------------------------------------    LEG LENGTHS:  equal  _____________________________________________________  _____________________________________________________    RADIOGRAPHIC FINDINGS:   Indication: Right hip pain    Comparison: Todays xrays were compared to previous xrays from 8/14/2017    AP pelvis, hip 2 views: Right: mild joint space narrowing, minimal osteophyte formation and No significant changes compared to prior radiographs.; Left: mild joint space narrowing, minimal osteophyte formation and No significant changes compared to prior radiographs.    Assessment/Plan:   Diagnosis Plan   1. Trochanteric bursitis of right hip     2. Right hip pain  XR Hip With or Without Pelvis 2 - 3 View Right   3. Primary osteoarthritis of both hips       Patient's hip pain is due to trochanteric bursitis.  She does have mild hip arthritis bilaterally but her hip range of motion is well-preserved and asymptomatic on exam today.  She is taking prescription anti-inflammatory with minimal improvement.  I discussed alternative treatment options for ongoing hip pain.  She is agreeable to trochanteric bursa cortisone injection the right hip today.  She will follow-up as needed.    Procedure Note:  I discussed with the patient the potential benefits of performing a therapeutic injection of the right hip trochanteric bursa as well as potential risks including but not limited to infection, swelling, pain, bleeding, bruising, nerve/vessel damage, skin color changes, transient elevation in blood glucose levels, and fat atrophy. After informed consent and verifying correct patient, procedure site, and type of procedure, the area was prepped with alcohol, ethyl chloride was used to numb the skin. Via the direct lateral approach, 3cc of 1% lidocaine, 3cc of 0.25% bupivicaine and 2  cc of 40mg/ml of Kenalog were injected into the right hip trochanteric bursa. The patient tolerated the procedure well. There were no complications. A sterile dressing was placed over the injection site.      Dao Stover MD  07/13/21  11:11 EDT

## 2021-07-13 NOTE — PROGRESS NOTES
Procedure   Large Joint Arthrocentesis: R greater trochanteric bursa  Date/Time: 7/13/2021 10:58 AM  Consent given by: patient  Site marked: site marked  Timeout: Immediately prior to procedure a time out was called to verify the correct patient, procedure, equipment, support staff and site/side marked as required   Supporting Documentation  Indications: pain   Procedure Details  Location: hip - R greater trochanteric bursa  Preparation: Patient was prepped and draped in the usual sterile fashion  Needle size: 23 G  Approach: anterolateral  Medications administered: 3 mL lidocaine PF 1% 1 %; 3 mL bupivacaine (PF) 0.25 %; 80 mg triamcinolone acetonide 40 MG/ML  Patient tolerance: patient tolerated the procedure well with no immediate complications

## 2021-08-03 ENCOUNTER — OFFICE VISIT (OUTPATIENT)
Dept: FAMILY MEDICINE CLINIC | Facility: CLINIC | Age: 66
End: 2021-08-03

## 2021-08-03 VITALS
OXYGEN SATURATION: 98 % | BODY MASS INDEX: 22.43 KG/M2 | HEIGHT: 64 IN | DIASTOLIC BLOOD PRESSURE: 66 MMHG | SYSTOLIC BLOOD PRESSURE: 122 MMHG | WEIGHT: 131.4 LBS | HEART RATE: 86 BPM

## 2021-08-03 DIAGNOSIS — M85.88 OTHER SPECIFIED DISORDERS OF BONE DENSITY AND STRUCTURE, OTHER SITE: ICD-10-CM

## 2021-08-03 DIAGNOSIS — I10 ESSENTIAL HYPERTENSION: Chronic | ICD-10-CM

## 2021-08-03 DIAGNOSIS — Z00.00 WELL ADULT EXAM: Primary | ICD-10-CM

## 2021-08-03 DIAGNOSIS — F51.01 PRIMARY INSOMNIA: ICD-10-CM

## 2021-08-03 DIAGNOSIS — E53.8 COBALAMIN DEFICIENCY: ICD-10-CM

## 2021-08-03 DIAGNOSIS — F33.42 MAJOR DEPRESSIVE DISORDER, RECURRENT, IN FULL REMISSION (HCC): ICD-10-CM

## 2021-08-03 DIAGNOSIS — K58.9 IRRITABLE BOWEL SYNDROME WITHOUT DIARRHEA: ICD-10-CM

## 2021-08-03 DIAGNOSIS — E55.9 VITAMIN D DEFICIENCY: ICD-10-CM

## 2021-08-03 DIAGNOSIS — Z85.3 H/O MALIGNANT NEOPLASM OF BREAST: ICD-10-CM

## 2021-08-03 PROBLEM — Z79.810 USE OF TAMOXIFEN (NOLVADEX): Status: RESOLVED | Noted: 2018-09-01 | Resolved: 2021-08-03

## 2021-08-03 PROBLEM — H60.60 CHRONIC OTITIS EXTERNA: Status: RESOLVED | Noted: 2018-05-18 | Resolved: 2021-08-03

## 2021-08-03 PROBLEM — J06.9 VIRAL URI WITH COUGH: Status: RESOLVED | Noted: 2019-02-09 | Resolved: 2021-08-03

## 2021-08-03 PROBLEM — F33.1 MAJOR DEPRESSIVE DISORDER, RECURRENT EPISODE, MODERATE DEGREE: Status: RESOLVED | Noted: 2021-06-28 | Resolved: 2021-08-03

## 2021-08-03 PROCEDURE — 1170F FXNL STATUS ASSESSED: CPT | Performed by: FAMILY MEDICINE

## 2021-08-03 PROCEDURE — 99397 PER PM REEVAL EST PAT 65+ YR: CPT | Performed by: FAMILY MEDICINE

## 2021-08-03 PROCEDURE — 1160F RVW MEDS BY RX/DR IN RCRD: CPT | Performed by: FAMILY MEDICINE

## 2021-08-03 PROCEDURE — 1126F AMNT PAIN NOTED NONE PRSNT: CPT | Performed by: FAMILY MEDICINE

## 2021-08-03 PROCEDURE — G0439 PPPS, SUBSEQ VISIT: HCPCS | Performed by: FAMILY MEDICINE

## 2021-08-03 PROCEDURE — 96160 PT-FOCUSED HLTH RISK ASSMT: CPT | Performed by: FAMILY MEDICINE

## 2021-08-03 NOTE — PATIENT INSTRUCTIONS
Start calcium-vitamin D supplement 600-400mg once a day at a separate time from multivitamin. Total daily calcium goal 1577-9621 mg and vitamin D 1838-2422 units.        Please provide copy of advanced care plan.         Medicare Wellness  Personal Prevention Plan of Service     Date of Office Visit:  2021  Encounter Provider:  Celina Villegas MD  Place of Service:  [facility]  Patient Name: Etelvina Prescott  :  1955    As part of the Medicare Wellness portion of your visit today, we are providing you with this personalized preventive plan of services (PPPS). This plan is based upon recommendations of the [place] Preventive Services Task Force (USPSTF) and the Advisory Committee on Immunization Practices (ACIP).    This lists the preventive care services that should be considered, and provides dates of when you are due. Items listed as completed are up-to-date and do not require any further intervention.    Health Maintenance   Topic Date Due   • DXA SCAN  Never done   • ZOSTER VACCINE (2 of 3) 2014   • PAP SMEAR  2021   • INFLUENZA VACCINE  10/01/2021   • ANNUAL WELLNESS VISIT  2022   • COLORECTAL CANCER SCREENING  2022   • TDAP/TD VACCINES (3 - Td or Tdap) 2023   • HEPATITIS C SCREENING  Completed   • COVID-19 Vaccine  Completed   • Pneumococcal Vaccine 65+  Completed   • MAMMOGRAM  Discontinued       Orders Placed This Encounter   Procedures   • DEXA Bone Density Axial     Standing Status:   Future     Standing Expiration Date:   8/3/2022     Order Specific Question:   Reason for Exam:     Answer:   screen     Order Specific Question:   Release to patient     Answer:   Immediate   • CBC (No Diff)     Standing Status:   Future     Standing Expiration Date:   8/3/2022     Order Specific Question:   Release to patient     Answer:   Immediate   • Comprehensive Metabolic Panel     Standing Status:   Future     Standing  Expiration Date:   8/3/2022     Order Specific Question:   Release to patient     Answer:   Immediate       Return in about 6 months (around 2/3/2022) for Office visit HTN and , Medicare Wellness 1 year.

## 2021-08-03 NOTE — PROGRESS NOTES
The ABCs of the Annual Wellness Visit  Subsequent Medicare Wellness Visit    Chief Complaint   Patient presents with   • Medicare Wellness-subsequent     Pt states she is not fasting for lab work.    • Annual Exam       Subjective   History of Present Illness:  Etelvina Prescott is a 66 y.o. female who presents for a Subsequent Medicare Wellness Visit.    Shot has helped right hip pain.     She doesn't drink milk, has some cheese. She eats     She is seeing GI in September, abdominal cramping is 85% better.   She has lower occasional pelvic cramping and nausea. She uses bentyl rarely.     She has taken hydroxyzine 4 times, she feels calmer.         HEALTH RISK ASSESSMENT    Recent Hospitalizations:  No hospitalization(s) within the last year.    Current Medical Providers:  Patient Care Team:  Celina Campos MD as PCP - General (Family Medicine)  Rikki Davis MD as Consulting Physician (Urology)  Dao Stover MD as Consulting Physician (Orthopedic Surgery)    Smoking Status:  Social History     Tobacco Use   Smoking Status Never Smoker   Smokeless Tobacco Never Used       Alcohol Consumption:  Social History     Substance and Sexual Activity   Alcohol Use Yes   • Alcohol/week: 5.0 standard drinks   • Types: 5 Glasses of wine per week       Depression Screen:   PHQ-2/PHQ-9 Depression Screening 8/3/2021   Little interest or pleasure in doing things 0   Feeling down, depressed, or hopeless 0   Total Score 0       Fall Risk Screen:  TANJA Fall Risk Assessment was completed, and patient is at LOW risk for falls.Assessment completed on:8/3/2021   TANJA Fall Risk Clinician Key Questions   Have you fallen in the past year?: Yes    Stay Idependant Patient Questions   Patient Fall Risk Assessment Score : 0    Two weeks ago she was with family in [place]. She was going out to garage and fell down steps onto ramp, she had bruising but no longer sore.         Health Habits and Functional and  Cognitive Screening:  Functional & Cognitive Status 8/3/2021   Do you have difficulty preparing food and eating? No   Do you have difficulty bathing yourself, getting dressed or grooming yourself? No   Do you have difficulty using the toilet? No   Do you have difficulty moving around from place to place? No   Do you have trouble with steps or getting out of a bed or a chair? No   Current Diet Well Balanced Diet   Dental Exam Up to date   Eye Exam Up to date   Exercise (times per week) 2 times per week   Current Exercises Include Walking   Current Exercise Activities Include -   Do you need help using the phone?  No   Are you deaf or do you have serious difficulty hearing?  No   Do you need help with transportation? No   Do you need help shopping? No   Do you need help preparing meals?  No   Do you need help with housework?  No   Do you need help with laundry? No   Do you need help taking your medications? No   Do you need help managing money? No   Do you ever drive or ride in a car without wearing a seat belt? No   Have you felt unusual stress, anger or loneliness in the last month? No   Who do you live with? Spouse   If you need help, do you have trouble finding someone available to you? No   Have you been bothered in the last four weeks by sexual problems? No   Do you have difficulty concentrating, remembering or making decisions? Yes         Does the patient have evidence of cognitive impairment? No    Asprin use counseling:Does not need ASA (and currently is not on it)    Age-appropriate Screening Schedule:  Refer to the list below for future screening recommendations based on patient's age, sex and/or medical conditions. Orders for these recommended tests are listed in the plan section. The patient has been provided with a written plan.    Health Maintenance   Topic Date Due   • DXA SCAN  Never done   • ZOSTER VACCINE (2 of 3) 04/07/2014   • PAP SMEAR  06/28/2021   • INFLUENZA VACCINE  10/01/2021   • TDAP/TD  VACCINES (3 - Td or Tdap) 05/30/2023   • MAMMOGRAM  Discontinued          Immunization History   Administered Date(s) Administered   • COVID-19 (PFIZER) 02/04/2021, 02/26/2021   • Flu Mist 11/14/2013, 10/01/2016   • Flu Vaccine Quad PF >36MO 10/31/2019, 10/14/2020   • Flu Vaccine Split Quad 10/31/2019   • Fluad Quad 65+ 10/07/2020   • Flublock Quad =>18yrs 10/28/2019   • Fluzone High Dose =>65 Years ([facility] ONLY) 12/08/2017   • Hepatitis A 01/22/2019, 07/22/2019   • PPD Test 01/01/2011, 08/03/2015, 06/07/2016, 06/07/2016, 09/06/2018   • Pneumococcal Polysaccharide (PPSV23) 05/03/2018   • Td 07/29/2004   • Tdap 05/30/2013   • Zostavax 02/10/2014       The following portions of the patient's history were reviewed and updated as appropriate: allergies, current medications, past family history, past medical history, past social history, past surgical history and problem list.    Outpatient Medications Prior to Visit   Medication Sig Dispense Refill   • cycloSPORINE (RESTASIS) 0.05 % ophthalmic emulsion Apply 1 drop to eye Every 12 (Twelve) Hours.     • fexofenadine (ALLEGRA) 180 MG tablet Take 1 tablet by mouth Daily As Needed.     • ibuprofen (ADVIL,MOTRIN) 400 MG tablet Take 1 tablet by mouth 2 (Two) Times a Day As Needed for Mild Pain . 180 tablet 1   • Multiple Vitamin (MULTIVITAMIN) capsule Take  by mouth daily.     • polyvinyl alcohol-povidone (REFRESH) 1.4-0.6 % ophthalmic solution Apply 1 drop to eye 2 (two) times a day.     • sertraline (ZOLOFT) 100 MG tablet Take 1 tablet by mouth Daily. 90 tablet 3   • traZODone (DESYREL) 100 MG tablet Take 1 tablet by mouth every night at bedtime. 90 tablet 3   • diclofenac (VOLTAREN) 1 % gel gel Apply 4 g topically to the appropriate area as directed 4 (Four) Times a Day As Needed (hip pain). 100 g 1   • dicyclomine (BENTYL) 20 MG tablet Take 1 tablet by mouth Every 6 (Six) Hours. 120 tablet 11   • enalapril (VASOTEC) 20 MG tablet TAKE 1/2 TABLET TWICE DAILY 90 tablet 1  "  • hydrOXYzine (ATARAX) 25 MG tablet Take 0.5-1 tablets by mouth Every 6 (Six) Hours As Needed for Anxiety. 60 tablet 3   • methylPREDNISolone (MEDROL) 4 MG dose pack Take as directed on package instructions. 1 each 0     No facility-administered medications prior to visit.       Patient Active Problem List   Diagnosis   • Atopic rhinitis   • Osteoarthritis of cervical spine   • Tear film insufficiency   • Headache   • Essential hypertension   • Irritable bowel syndrome   • Sarcoidosis   • Cobalamin deficiency   • Vitamin D deficiency   • Mitral regurgitation   • Low back pain   • Left hip pain   • Chronic fatigue   • Lumbar spondylosis   • Atypical glandular cells of undetermined significance (PRITESH) on cervical Pap smear   • Endometrial hyperplasia   • Greater trochanteric bursitis of left hip   • Gluteal tendinitis of left buttock   • Acquired stenosis of external ear canal   • Primary malignant neoplasm of lower outer quadrant of female breast (CMS/HCC)   • H/O malignant neoplasm of breast   • Situational anxiety   • Major depressive disorder, recurrent, in full remission (CMS/HCC)   • Primary insomnia       Advanced Care Planning:  ACP discussion was held with the patient during this visit. Patient has an advance directive (not in EMR), copy requested.    Review of Systems   HENT: Negative for hearing loss.    Musculoskeletal: Positive for arthralgias.   Psychiatric/Behavioral: Negative for dysphoric mood.       Compared to one year ago, the patient feels her physical health is better.  Compared to one year ago, the patient feels her mental health is better.    Reviewed chart for potential of high risk medication in the elderly: yes  Reviewed chart for potential of harmful drug interactions in the elderly:yes    Objective         Vitals:    08/03/21 0926   BP: 122/66   Pulse: 86   SpO2: 98%   Weight: 59.6 kg (131 lb 6.4 oz)   Height: 162.6 cm (64.02\")   PainSc: 0-No pain       Body mass index is 22.54 " kg/m².  Discussed the patient's BMI with her. The BMI is in the acceptable range.    Physical Exam  Constitutional:       General: She is not in acute distress.  HENT:      Right Ear: Tympanic membrane and ear canal normal.      Left Ear: Tympanic membrane and ear canal normal.   Eyes:      General:         Right eye: No discharge.         Left eye: No discharge.      Conjunctiva/sclera: Conjunctivae normal.   Neck:      Thyroid: No thyromegaly.   Cardiovascular:      Rate and Rhythm: Normal rate and regular rhythm.   Pulmonary:      Effort: Pulmonary effort is normal.      Breath sounds: Normal breath sounds.   Abdominal:      Palpations: Abdomen is soft.      Tenderness: There is no abdominal tenderness.   Musculoskeletal:      Cervical back: Neck supple.   Lymphadenopathy:      Head:      Right side of head: No submandibular, preauricular or posterior auricular adenopathy.      Left side of head: No submandibular, preauricular or posterior auricular adenopathy.      Cervical: No cervical adenopathy.   Skin:     General: Skin is warm and dry.   Neurological:      Mental Status: She is alert and oriented to person, place, and time.   Psychiatric:         Mood and Affect: Mood normal.         Behavior: Behavior normal.         Thought Content: Thought content normal.         Judgment: Judgment normal.           Office Visit with Dao Stover MD (07/13/2021)    SCANNED - LABS (06/11/2021)      Assessment/Plan   Medicare Risks and Personalized Health Plan  CMS Preventative Services Quick Reference  Advance Directive Discussion  Osteoporosis Risk    Counseled on health maintenance topics and preventative care recommendations: Diet, supplements    The above risks/problems have been discussed with the patient.  Pertinent information has been shared with the patient in the After Visit Summary.  Follow up plans and orders are seen below in the Assessment/Plan Section.    Diagnoses and all orders for this  visit:    1. Well adult exam (Primary)  Reviewed previsit labs from June.  2. Essential hypertension  Able.  Continue enalapril.  Recheck in 6 months.  3. Vitamin D deficiency  Stable.  Continue vitamin D supplementation.  4. Cobalamin deficiency  Continue multivitamin.  5. H/O malignant neoplasm of breast  Status postmastectomy.  No mammography needed.  6. Major depressive disorder, recurrent, in full remission (CMS/HCC)  Continue Zoloft.  7. Other specified disorders of bone density and structure, other site  -     DEXA Bone Density Axial; Future    8. Irritable bowel syndrome without diarrhea  Keep appointment for gastroenterology next month.  Continue Bentyl as needed.  9. Primary insomnia  Continue trazodone.    Follow Up:  Return in about 6 months (around 2/3/2022) for Office visit HTN and , Medicare Wellness 1 year.     An After Visit Summary and PPPS were given to the patient.           Electronically signed by Celina Villegas MD, 08/03/21, 9:49 AM EDT.

## 2021-09-02 ENCOUNTER — OFFICE VISIT (OUTPATIENT)
Dept: GASTROENTEROLOGY | Facility: CLINIC | Age: 66
End: 2021-09-02

## 2021-09-02 VITALS
WEIGHT: 131 LBS | BODY MASS INDEX: 22.47 KG/M2 | SYSTOLIC BLOOD PRESSURE: 140 MMHG | DIASTOLIC BLOOD PRESSURE: 60 MMHG | HEART RATE: 93 BPM

## 2021-09-02 DIAGNOSIS — K59.04 CHRONIC IDIOPATHIC CONSTIPATION: ICD-10-CM

## 2021-09-02 DIAGNOSIS — R10.30 LOWER ABDOMINAL PAIN: Primary | ICD-10-CM

## 2021-09-02 PROCEDURE — 99214 OFFICE O/P EST MOD 30 MIN: CPT | Performed by: NURSE PRACTITIONER

## 2021-09-02 NOTE — PROGRESS NOTES
GASTROENTEROLOGY OFFICE NOTE  Etelvina Prescott  [id number]  1955    CARE TEAM  Patient Care Team:  Celina Campos MD as PCP - General (Family Medicine)  Rikki Davis MD as Consulting Physician (Urology)  Dao Stover MD as Consulting Physician (Orthopedic Surgery)    Referring Provider: Celina Campos MD    Chief Complaint   Patient presents with   • Abdominal Pain        HISTORY OF PRESENT ILLNESS:  Patient is a 66-year-old female who presents today with a 1 year history of intermittent lower abdominal pain described as a cramping type sensation with associated nausea, bloating and gas.  She has had some improvement with her associated symptoms, she is no longer having bloating and gas on occasion, she will have nausea.  She has had a fairly extensive work-up regarding this pain over the past year including a CT abdomen pelvis that was normal but did show moderate to large amount of stool burden.  She has been evaluated by gynecology and urology with no etiology found to explain her symptoms.  She is taking dicyclomine and Pepcid without much of any relief in her symptoms.  She has also had pelvic floor therapy which did not provide any relief in her abdominal pain.    She would describe her typical bowel pattern is irregular.  She often times goes 3 to 4 days without a bowel movement and will have small volume and hard stool.    She has a history of colon polyps.  Her last colonoscopy was in August 2017 and was normal with the exception of diverticulosis in the sigmoid colon.    PAST MEDICAL HISTORY  Past Medical History:   Diagnosis Date   • Allergic rhinitis    • Breast cancer (CMS/HCC) 2010    Right -Taxotere and Cytoxan ×4.  Tamoxifen.  Mastectomy   • Breast cancer (CMS/HCC)    • Chronic anxiety Adulthood    Good response to citalopram   • Chronic constipation Adulthood    MOM or MiraLAX   • Chronic fatigue    • Depression 1990    Good response to amitriptyline and  citalopram   • Diverticulosis 2017    Per colonoscopy   • Dry eye syndrome     Moisturizers   • Heart murmur Mitrovalve   • Herpes zoster    • History of exposure to tuberculosis Remote    PPDs  onward all negative   • Hypertension    • Impacted cerumen    • Irritable bowel syndrome    • MVP (mitral valve prolapse)     Recurring tachycardias   • Nephrolithiasis    • Plantar fasciitis    • Sarcoidosis     Active in eyes and lungs   • Sleep disturbances     Good response to amitriptyline and trazodone   • Uterine bleeding     Uterine ablation for bleeding and chronic iron deficiency        PAST SURGICAL HISTORY  Past Surgical History:   Procedure Laterality Date   • BREAST BIOPSY Right     Benign   •  SECTION   &    • COLONOSCOPY  ,     Diverticulosis only.   • COLONOSCOPY W/ POLYPECTOMY  Remote   • DILATATION AND CURETTAGE     • ENDOMETRIAL ABLATION     • HYSTEROSCOPY ENDOMETRIAL ABLATION      persistant bleeding   • MASTECTOMY Bilateral     bilateral with implants, breast cancer on the right        MEDICATIONS:    Current Outpatient Medications:   •  cycloSPORINE (RESTASIS) 0.05 % ophthalmic emulsion, Apply 1 drop to eye Every 12 (Twelve) Hours., Disp: , Rfl:   •  diclofenac (VOLTAREN) 1 % gel gel, Apply 4 g topically to the appropriate area as directed 4 (Four) Times a Day As Needed (hip pain)., Disp: 100 g, Rfl: 1  •  dicyclomine (BENTYL) 20 MG tablet, Take 1 tablet by mouth Every 6 (Six) Hours., Disp: 120 tablet, Rfl: 11  •  enalapril (VASOTEC) 20 MG tablet, TAKE 1/2 TABLET TWICE DAILY, Disp: 90 tablet, Rfl: 1  •  fexofenadine (ALLEGRA) 180 MG tablet, Take 1 tablet by mouth Daily As Needed., Disp: , Rfl:   •  hydrOXYzine (ATARAX) 25 MG tablet, Take 0.5-1 tablets by mouth Every 6 (Six) Hours As Needed for Anxiety., Disp: 60 tablet, Rfl: 3  •  ibuprofen (ADVIL,MOTRIN) 400 MG tablet, Take 1 tablet by mouth 2 (Two) Times a Day As Needed for Mild  Pain ., Disp: 180 tablet, Rfl: 1  •  Multiple Vitamin (MULTIVITAMIN) capsule, Take  by mouth daily., Disp: , Rfl:   •  Plecanatide 3 MG tablet, Take 1 tablet by mouth Daily., Disp: 90 tablet, Rfl: 3  •  polyvinyl alcohol-povidone (REFRESH) 1.4-0.6 % ophthalmic solution, Apply 1 drop to eye 2 (two) times a day., Disp: , Rfl:   •  sertraline (ZOLOFT) 100 MG tablet, Take 1 tablet by mouth Daily., Disp: 90 tablet, Rfl: 3  •  traZODone (DESYREL) 100 MG tablet, Take 1 tablet by mouth every night at bedtime., Disp: 90 tablet, Rfl: 3    ALLERGIES  Allergies   Allergen Reactions   • Penicillins Rash   • Sulfa Antibiotics Rash       FAMILY HISTORY:  Family History   Problem Relation Age of Onset   • Alzheimer's disease Mother          age 89   • Hypothyroidism Mother    • Osteoporosis Mother    • Pulmonary embolism Mother    • Coronary artery disease Father    • Dementia Father    • Diabetes Father    • Hypertension Father    • Heart attack Father    • Hypothyroidism Sister    • Hypertension Sister    • Breast cancer Maternal Grandmother    • Stomach cancer Paternal Aunt    • Lung cancer Paternal Aunt        SOCIAL HISTORY  Social History     Socioeconomic History   • Marital status:      Spouse name: Woody   • Number of children: Not on file   • Years of education: Not on file   • Highest education level: Not on file   Tobacco Use   • Smoking status: Never Smoker   • Smokeless tobacco: Never Used   Vaping Use   • Vaping Use: Never used   Substance and Sexual Activity   • Alcohol use: Yes     Alcohol/week: 5.0 standard drinks     Types: 5 Glasses of wine per week   • Drug use: No   • Sexual activity: Not Currently     Partners: Male       REVIEW OF SYSTEMS  Review of Systems   Constitutional: Negative for activity change, appetite change, chills, diaphoresis, fatigue, fever, unexpected weight gain and unexpected weight loss.   HENT: Negative for trouble swallowing and voice change.    Gastrointestinal: Positive for  abdominal pain and constipation. Negative for abdominal distention, anal bleeding, blood in stool, diarrhea, nausea, rectal pain, vomiting, GERD and indigestion.         PHYSICAL EXAM   /60   Pulse 93   Wt 59.4 kg (131 lb)   BMI 22.47 kg/m²   Physical Exam  Constitutional:       General: She is not in acute distress.     Appearance: She is well-developed.   HENT:      Head: Normocephalic and atraumatic.      Nose: Nose normal.   Eyes:      Conjunctiva/sclera: Conjunctivae normal.      Pupils: Pupils are equal, round, and reactive to light.   Pulmonary:      Effort: Pulmonary effort is normal.   Abdominal:      General: There is no distension.      Palpations: Abdomen is soft.   Neurological:      Mental Status: She is alert and oriented to person, place, and time.   Psychiatric:         Behavior: Behavior normal.         Judgment: Judgment normal.       Results Review:  EXAMINATION: CT ABDOMEN AND PELVIS W WO CONTRAST-      INDICATION: Abdominal distension; R39.15-Urgency of urination.      TECHNIQUE: CT abdomen and pelvis with and without intervenous contrast  administration.     The radiation dose reduction device was turned on for each scan per the  ALARA (As Low as Reasonably Achievable) protocol.     COMPARISON: None.     FINDINGS: Lung bases are grossly clear. Liver without focal lesion.  Gallbladder is unremarkable. No biliary dilatation. Pancreas and spleen  unremarkable. Adrenals without distinct nodule. Kidneys without  hydronephrosis or hydroureter. No bulky retroperitoneal adenopathy.  Patent nonaneurysmal abdominal aorta with patent celiac and SMA origins.  Portal veins and IVC are patent. GI tract evaluation without  disproportionate dilatation of bowel to suggest mechanical obstructive  process. No focal wall thickening of bowel is clearly evident and no  evidence for penetrating disease process with overall moderate to large  colonic stool burden in the proximal colonic segments. Pelvic  viscera  unremarkable without bulky pelvic adenopathy or free fluid. Degenerative  changes of the spine and pelvis without aggressive osseous lesion. No  soft tissue body wall findings of acuity.     IMPRESSION:  No acute inflammatory process clearly evident within the  abdomen and pelvis specifically GI tract without focal inflammatory wall  thickening or evidence for penetrating disease process without loculated  fluid collection identified. Overall moderate to large proximal colonic  stool burden noted. No evidence for obstructive uropathy or  hydronephrosis. Symmetric nephrogram of the kidneys.    ASSESSMENT / PLAN  1.  Abdominal pain, lower abdomen  2.  Constipation  Consider etiology of abdominal pain to be related to constipation.  We will plan to treat for constipation and see if we get any relief in the occurrence of abdominal pain.  If not consider colonoscopy.  -Trulance 3 mg daily    Return in about 6 weeks (around 10/14/2021).    I discussed the patients findings and my recommendations with patient    MORE Crawford

## 2021-10-04 ENCOUNTER — TELEPHONE (OUTPATIENT)
Dept: GASTROENTEROLOGY | Facility: CLINIC | Age: 66
End: 2021-10-04

## 2021-10-04 NOTE — TELEPHONE ENCOUNTER
Patient called concerning the trulance samples she has been taking for a couple of weeks. It is causing her diarrhea during the day 3 and 4 times. She is also concerned about the cost. She said she only has six days of samples left. Thank you

## 2021-10-05 NOTE — TELEPHONE ENCOUNTER
Tried to call patient to inform her that she could switch from trueance over to [employer]. left message for her to return phone call.

## 2021-10-18 ENCOUNTER — OFFICE VISIT (OUTPATIENT)
Dept: GASTROENTEROLOGY | Facility: CLINIC | Age: 66
End: 2021-10-18

## 2021-10-18 VITALS
TEMPERATURE: 97.1 F | BODY MASS INDEX: 22.81 KG/M2 | DIASTOLIC BLOOD PRESSURE: 70 MMHG | WEIGHT: 133.6 LBS | HEIGHT: 64 IN | HEART RATE: 91 BPM | SYSTOLIC BLOOD PRESSURE: 160 MMHG

## 2021-10-18 DIAGNOSIS — K59.04 CHRONIC IDIOPATHIC CONSTIPATION: Primary | ICD-10-CM

## 2021-10-18 PROCEDURE — 99214 OFFICE O/P EST MOD 30 MIN: CPT | Performed by: NURSE PRACTITIONER

## 2021-10-18 RX ORDER — LINACLOTIDE 72 UG/1
CAPSULE, GELATIN COATED ORAL
COMMUNITY
Start: 2021-10-05 | End: 2021-10-18

## 2021-10-18 RX ORDER — LUBIPROSTONE 24 UG/1
24 CAPSULE ORAL 2 TIMES DAILY WITH MEALS
Qty: 60 CAPSULE | Refills: 5 | Status: SHIPPED | OUTPATIENT
Start: 2021-10-18 | End: 2022-02-28

## 2021-10-18 NOTE — PROGRESS NOTES
GASTROENTEROLOGY OFFICE NOTE  Etelvina Prescott  [id number]  1955    CARE TEAM  Patient Care Team:  Celina Campos MD as PCP - General (Family Medicine)  Rikki Davis MD as Consulting Physician (Urology)  Dao Stover MD as Consulting Physician (Orthopedic Surgery)    Referring Provider: Celina Campos MD    Chief Complaint   Patient presents with   • Constipation   • Diarrhea   • Nausea        HISTORY OF PRESENT ILLNESS:  Patient presents in follow-up with constipation with associated abdominal cramping and nausea.  She trialed Trulance which gave her 3-4 loose, urgent stools daily.  Subsequently has been taking Linzess 72 mcg daily that again gives her diarrhea, watery stools although not as urgent as with the Trulance.  Despite having watery stools, she still does not feel like she is having a bowel movement and this feels constipated.  Occasionally she'll have some small jasmin of stool but never a good bowel movement.    PAST MEDICAL HISTORY  Past Medical History:   Diagnosis Date   • Allergic rhinitis    • Breast cancer (HCC) 2010    Right -Taxotere and Cytoxan ×4.  Tamoxifen.  Mastectomy   • Breast cancer (HCC)    • Cataract 2021   • Chronic anxiety Adulthood    Good response to citalopram   • Chronic constipation Adulthood    MOM or MiraLAX   • Chronic fatigue    • Depression 1990    Good response to amitriptyline and citalopram   • Diverticulosis 2017    Per colonoscopy   • Dry eye syndrome 2010    Moisturizers   • Heart murmur Mitrovalve   • Herpes zoster    • History of exposure to tuberculosis Remote    PPDs 1993 onward all negative   • Hypertension 2006   • Impacted cerumen    • Irritable bowel syndrome    • MVP (mitral valve prolapse) 1985    Recurring tachycardias   • Nephrolithiasis 1985   • Plantar fasciitis    • Sarcoidosis 2003    Active in eyes and lungs   • Sleep disturbances 1990    Good response to amitriptyline and trazodone   • Uterine bleeding  2006    Uterine ablation for bleeding and chronic iron deficiency        PAST SURGICAL HISTORY  Past Surgical History:   Procedure Laterality Date   • BREAST BIOPSY Right     Benign   • CATARACT EXTRACTION Bilateral    •  SECTION   &    • COLONOSCOPY  , 2017    Diverticulosis only.   • COLONOSCOPY W/ POLYPECTOMY  Remote   • DILATATION AND CURETTAGE  2016   • ENDOMETRIAL ABLATION     • HYSTEROSCOPY ENDOMETRIAL ABLATION  2006    persistant bleeding   • MASTECTOMY Bilateral     bilateral with implants, breast cancer on the right        MEDICATIONS:    Current Outpatient Medications:   •  cycloSPORINE (RESTASIS) 0.05 % ophthalmic emulsion, Apply 1 drop to eye Every 12 (Twelve) Hours., Disp: , Rfl:   •  diclofenac (VOLTAREN) 1 % gel gel, Apply 4 g topically to the appropriate area as directed 4 (Four) Times a Day As Needed (hip pain)., Disp: 100 g, Rfl: 1  •  dicyclomine (BENTYL) 20 MG tablet, Take 1 tablet by mouth Every 6 (Six) Hours., Disp: 120 tablet, Rfl: 11  •  enalapril (VASOTEC) 20 MG tablet, TAKE 1/2 TABLET TWICE DAILY, Disp: 90 tablet, Rfl: 1  •  fexofenadine (ALLEGRA) 180 MG tablet, Take 1 tablet by mouth Daily As Needed., Disp: , Rfl:   •  hydrOXYzine (ATARAX) 25 MG tablet, Take 0.5-1 tablets by mouth Every 6 (Six) Hours As Needed for Anxiety., Disp: 60 tablet, Rfl: 3  •  ibuprofen (ADVIL,MOTRIN) 400 MG tablet, Take 1 tablet by mouth 2 (Two) Times a Day As Needed for Mild Pain ., Disp: 180 tablet, Rfl: 1  •  Multiple Vitamin (MULTIVITAMIN) capsule, Take  by mouth daily., Disp: , Rfl:   •  polyvinyl alcohol-povidone (REFRESH) 1.4-0.6 % ophthalmic solution, Apply 1 drop to eye 2 (two) times a day., Disp: , Rfl:   •  sertraline (ZOLOFT) 100 MG tablet, Take 1 tablet by mouth Daily., Disp: 90 tablet, Rfl: 3  •  traZODone (DESYREL) 100 MG tablet, Take 1 tablet by mouth every night at bedtime., Disp: 90 tablet, Rfl: 3  •  lubiprostone (Amitiza) 24 MCG capsule, Take 1 capsule by mouth  "2 (Two) Times a Day With Meals., Disp: 60 capsule, Rfl: 5    ALLERGIES  Allergies   Allergen Reactions   • Penicillins Rash   • Sulfa Antibiotics Rash       FAMILY HISTORY:  Family History   Problem Relation Age of Onset   • Alzheimer's disease Mother          age 89   • Hypothyroidism Mother    • Osteoporosis Mother    • Pulmonary embolism Mother    • Coronary artery disease Father    • Dementia Father    • Diabetes Father    • Hypertension Father    • Heart attack Father    • Hypothyroidism Sister    • Hypertension Sister    • Breast cancer Maternal Grandmother    • Stomach cancer Paternal Aunt    • Lung cancer Paternal Aunt        SOCIAL HISTORY  Social History     Socioeconomic History   • Marital status:      Spouse name: Woody   Tobacco Use   • Smoking status: Never Smoker   • Smokeless tobacco: Never Used   Vaping Use   • Vaping Use: Never used   Substance and Sexual Activity   • Alcohol use: Yes     Alcohol/week: 5.0 standard drinks     Types: 5 Glasses of wine per week   • Drug use: No   • Sexual activity: Not Currently     Partners: Male       REVIEW OF SYSTEMS  Review of Systems   Constitutional: Negative for activity change, appetite change, chills, diaphoresis, fatigue, fever, unexpected weight gain and unexpected weight loss.   HENT: Negative for trouble swallowing and voice change.    Gastrointestinal: Positive for abdominal pain, constipation and nausea. Negative for abdominal distention, anal bleeding, blood in stool, diarrhea, rectal pain, vomiting, GERD and indigestion.         PHYSICAL EXAM   /70 (BP Location: Left arm, Patient Position: Sitting, Cuff Size: Adult)   Pulse 91   Temp 97.1 °F (36.2 °C) (Temporal)   Ht 162.6 cm (64\")   Wt 60.6 kg (133 lb 9.6 oz)   BMI 22.93 kg/m²   Physical Exam  Constitutional:       General: She is not in acute distress.     Appearance: She is well-developed.   HENT:      Head: Normocephalic and atraumatic.      Nose: Nose normal.   Eyes:      " Conjunctiva/sclera: Conjunctivae normal.      Pupils: Pupils are equal, round, and reactive to light.   Pulmonary:      Effort: Pulmonary effort is normal.   Abdominal:      General: There is no distension.      Palpations: Abdomen is soft.   Neurological:      Mental Status: She is alert and oriented to person, place, and time.   Psychiatric:         Behavior: Behavior normal.         Judgment: Judgment normal.           ASSESSMENT / PLAN  1.  IBS-C  -Amitiza 24 mcg twice daily  -FODMAP diet    Return in about 4 weeks (around 11/15/2021).    I discussed the patients findings and my recommendations with patient    MORE Crawford

## 2021-10-29 ENCOUNTER — HOSPITAL ENCOUNTER (OUTPATIENT)
Dept: BONE DENSITY | Facility: HOSPITAL | Age: 66
Discharge: HOME | End: 2021-10-29
Admitting: FAMILY MEDICINE

## 2021-10-29 DIAGNOSIS — M85.88 OTHER SPECIFIED DISORDERS OF BONE DENSITY AND STRUCTURE, OTHER SITE: ICD-10-CM

## 2021-10-29 PROCEDURE — 77080 DXA BONE DENSITY AXIAL: CPT

## 2021-11-02 ENCOUNTER — TELEPHONE (OUTPATIENT)
Dept: FAMILY MEDICINE CLINIC | Facility: CLINIC | Age: 66
End: 2021-11-02

## 2021-11-02 NOTE — TELEPHONE ENCOUNTER
Letter in patients chart reads as follows    The test results show osteopenia has worsened. I recommend repeating test in 2 years.        Patient informed of lab letter and results. She had no further questions at this time.

## 2021-11-02 NOTE — TELEPHONE ENCOUNTER
Caller: Etelvina Prescott    Relationship: Self    Best call back number: [phone]    Caller requesting test results: PATIENT    What test was performed: DEXA SCAN    When was the test performed: 10/29/21    Where was the test performed: [facility]

## 2021-11-19 ENCOUNTER — OFFICE VISIT (OUTPATIENT)
Dept: GASTROENTEROLOGY | Facility: CLINIC | Age: 66
End: 2021-11-19

## 2021-11-19 VITALS
BODY MASS INDEX: 23.08 KG/M2 | HEART RATE: 86 BPM | SYSTOLIC BLOOD PRESSURE: 144 MMHG | DIASTOLIC BLOOD PRESSURE: 68 MMHG | TEMPERATURE: 97.7 F | WEIGHT: 135.2 LBS | HEIGHT: 64 IN

## 2021-11-19 DIAGNOSIS — K59.04 CHRONIC IDIOPATHIC CONSTIPATION: Primary | ICD-10-CM

## 2021-11-19 DIAGNOSIS — R10.31 RIGHT LOWER QUADRANT ABDOMINAL PAIN: ICD-10-CM

## 2021-11-19 PROCEDURE — 99213 OFFICE O/P EST LOW 20 MIN: CPT | Performed by: NURSE PRACTITIONER

## 2021-11-19 RX ORDER — SIMETHICONE 125 MG
125 TABLET,CHEWABLE ORAL EVERY 6 HOURS PRN
COMMUNITY
End: 2023-02-22

## 2021-11-19 RX ORDER — LUBIPROSTONE 24 UG/1
CAPSULE ORAL
COMMUNITY
Start: 2021-11-05 | End: 2021-11-19

## 2021-11-19 NOTE — PROGRESS NOTES
GASTROENTEROLOGY OFFICE NOTE  Etelvina Prescott  [id number]  1955    CARE TEAM  Patient Care Team:  Celina Campos MD as PCP - General (Family Medicine)  Rikki Davis MD as Consulting Physician (Urology)  Dao Stover MD as Consulting Physician (Orthopedic Surgery)    Referring Provider: Celina Campos MD    Chief Complaint   Patient presents with   • Abdominal Pain     Some cramping        HISTORY OF PRESENT ILLNESS:  Patient returns in follow-up with constipation and intermittent right lower quadrant abdominal pain.  She has previously taken Trulance and Linzess for constipation, both of which gave her severe diarrhea.  At her last visit we started her on Amitiza 24 mcg twice daily.  She reports that this was very expensive so she only asked for a partial fill and she is now out of the medication.  While she was taking it she had some diarrhea with it although not nearly as severe as with Trulance or Linzess.  Since she has been out of Amitiza she is once again going several days without a bowel movement.  She had a good bowel movement yesterday and is feeling well since then.  She has a lot of gassy crampy type pain in her right lower quadrant of the abdomen, she just started taking Gas-X recently and this relieves that pain.    She has a history of colon polyps.  Her last colonoscopy was in 2017 by Dr. Foster.  She had no polyps at that time.  She is a breast cancer survivor and admits that she is always concerned of recurrence of breast cancer but also any type of cancer.  She is very concerned that her right lower quadrant pain may be related to a cancer.  Previous CT scan was negative.    PAST MEDICAL HISTORY  Past Medical History:   Diagnosis Date   • Allergic rhinitis    • Breast cancer (HCC) 2010    Right -Taxotere and Cytoxan ×4.  Tamoxifen.  Mastectomy   • Breast cancer (HCC)    • Cataract 2021   • Chronic anxiety Adulthood    Good response to citalopram   •  Chronic constipation Adulthood    MOM or MiraLAX   • Chronic fatigue    • Depression     Good response to amitriptyline and citalopram   • Diverticulosis 2017    Per colonoscopy   • Dry eye syndrome 2010    Moisturizers   • Heart murmur Mitrovalve   • Herpes zoster    • History of exposure to tuberculosis Remote    PPDs  onward all negative   • Hypertension    • Impacted cerumen    • Irritable bowel syndrome    • MVP (mitral valve prolapse)     Recurring tachycardias   • Nephrolithiasis    • Plantar fasciitis    • Sarcoidosis     Active in eyes and lungs   • Sleep disturbances     Good response to amitriptyline and trazodone   • Uterine bleeding 2006    Uterine ablation for bleeding and chronic iron deficiency        PAST SURGICAL HISTORY  Past Surgical History:   Procedure Laterality Date   • BREAST BIOPSY Right     Benign   • CATARACT EXTRACTION Bilateral    •  SECTION   &    • COLONOSCOPY  ,     Diverticulosis only.   • COLONOSCOPY W/ POLYPECTOMY  Remote   • DILATATION AND CURETTAGE     • ENDOMETRIAL ABLATION     • HYSTEROSCOPY ENDOMETRIAL ABLATION      persistant bleeding   • MASTECTOMY Bilateral     bilateral with implants, breast cancer on the right        MEDICATIONS:    Current Outpatient Medications:   •  cycloSPORINE (RESTASIS) 0.05 % ophthalmic emulsion, Apply 1 drop to eye Every 12 (Twelve) Hours., Disp: , Rfl:   •  diclofenac (VOLTAREN) 1 % gel gel, Apply 4 g topically to the appropriate area as directed 4 (Four) Times a Day As Needed (hip pain)., Disp: 100 g, Rfl: 1  •  dicyclomine (BENTYL) 20 MG tablet, Take 1 tablet by mouth Every 6 (Six) Hours., Disp: 120 tablet, Rfl: 11  •  enalapril (VASOTEC) 20 MG tablet, TAKE 1/2 TABLET TWICE DAILY, Disp: 90 tablet, Rfl: 1  •  fexofenadine (ALLEGRA) 180 MG tablet, Take 1 tablet by mouth Daily As Needed., Disp: , Rfl:   •  hydrOXYzine (ATARAX) 25 MG tablet, Take 0.5-1 tablets by mouth Every 6  (Six) Hours As Needed for Anxiety., Disp: 60 tablet, Rfl: 3  •  ibuprofen (ADVIL,MOTRIN) 400 MG tablet, Take 1 tablet by mouth 2 (Two) Times a Day As Needed for Mild Pain ., Disp: 180 tablet, Rfl: 1  •  Multiple Vitamin (MULTIVITAMIN) capsule, Take  by mouth daily., Disp: , Rfl:   •  polyvinyl alcohol-povidone (REFRESH) 1.4-0.6 % ophthalmic solution, Apply 1 drop to eye 2 (two) times a day., Disp: , Rfl:   •  sertraline (ZOLOFT) 100 MG tablet, Take 1 tablet by mouth Daily., Disp: 90 tablet, Rfl: 3  •  simethicone (MYLICON) 125 MG chewable tablet, Chew 125 mg Every 6 (Six) Hours As Needed for Flatulence., Disp: , Rfl:   •  traZODone (DESYREL) 100 MG tablet, Take 1 tablet by mouth every night at bedtime., Disp: 90 tablet, Rfl: 3  •  Calcium Carbonate (Calcium 600) 1500 (600 Ca) MG tablet, Calcium 600-D3 Plus (mag-zinc)  daily, Disp: , Rfl:   •  lubiprostone (Amitiza) 24 MCG capsule, Take 1 capsule by mouth 2 (Two) Times a Day With Meals., Disp: 60 capsule, Rfl: 5    ALLERGIES  Allergies   Allergen Reactions   • Penicillins Rash   • Sulfa Antibiotics Rash       FAMILY HISTORY:  Family History   Problem Relation Age of Onset   • Alzheimer's disease Mother          age 89   • Hypothyroidism Mother    • Osteoporosis Mother    • Pulmonary embolism Mother    • Coronary artery disease Father    • Dementia Father    • Diabetes Father    • Hypertension Father    • Heart attack Father    • Hypothyroidism Sister    • Hypertension Sister    • Breast cancer Maternal Grandmother    • Stomach cancer Paternal Aunt    • Lung cancer Paternal Aunt        SOCIAL HISTORY  Social History     Socioeconomic History   • Marital status:      Spouse name: Woody   Tobacco Use   • Smoking status: Never Smoker   • Smokeless tobacco: Never Used   Vaping Use   • Vaping Use: Never used   Substance and Sexual Activity   • Alcohol use: Yes     Alcohol/week: 5.0 standard drinks     Types: 5 Glasses of wine per week   • Drug use: No   • Sexual  "activity: Not Currently     Partners: Male           PHYSICAL EXAM   /68 (BP Location: Left arm, Patient Position: Sitting, Cuff Size: Adult)   Pulse 86   Temp 97.7 °F (36.5 °C) (Temporal)   Ht 162.6 cm (64\")   Wt 61.3 kg (135 lb 3.2 oz)   BMI 23.21 kg/m²   Physical Exam  Constitutional:       General: She is not in acute distress.     Appearance: She is well-developed.   HENT:      Head: Normocephalic and atraumatic.      Nose: Nose normal.   Eyes:      Conjunctiva/sclera: Conjunctivae normal.      Pupils: Pupils are equal, round, and reactive to light.   Pulmonary:      Effort: Pulmonary effort is normal.   Abdominal:      General: There is no distension.   Neurological:      Mental Status: She is alert and oriented to person, place, and time.   Psychiatric:         Behavior: Behavior normal.         Judgment: Judgment normal.           ASSESSMENT / PLAN  1.  Chronic idiopathic constipation  -Discontinue Amitiza  -Begin MiraLAX 17 g twice daily and decrease to once daily if diarrhea    2.  History of colon polyps  -Colonoscopy for colorectal cancer screening, due in 2022-we will go on and schedule this today for her.    Return in about 3 months (around 2/19/2022).    I discussed the patients findings and my recommendations with patient    MORE Crawford                    "

## 2021-12-16 ENCOUNTER — OFFICE VISIT (OUTPATIENT)
Dept: ORTHOPEDIC SURGERY | Facility: CLINIC | Age: 66
End: 2021-12-16

## 2021-12-16 VITALS
WEIGHT: 135.14 LBS | BODY MASS INDEX: 23.07 KG/M2 | SYSTOLIC BLOOD PRESSURE: 128 MMHG | DIASTOLIC BLOOD PRESSURE: 78 MMHG | HEIGHT: 64 IN

## 2021-12-16 DIAGNOSIS — M70.61 TROCHANTERIC BURSITIS OF RIGHT HIP: Primary | ICD-10-CM

## 2021-12-16 PROCEDURE — 99213 OFFICE O/P EST LOW 20 MIN: CPT | Performed by: ORTHOPAEDIC SURGERY

## 2021-12-16 PROCEDURE — 20610 DRAIN/INJ JOINT/BURSA W/O US: CPT | Performed by: ORTHOPAEDIC SURGERY

## 2021-12-16 RX ORDER — BUPIVACAINE HYDROCHLORIDE 2.5 MG/ML
3 INJECTION, SOLUTION EPIDURAL; INFILTRATION; INTRACAUDAL
Status: COMPLETED | OUTPATIENT
Start: 2021-12-16 | End: 2021-12-16

## 2021-12-16 RX ORDER — LIDOCAINE HYDROCHLORIDE 10 MG/ML
3 INJECTION, SOLUTION EPIDURAL; INFILTRATION; INTRACAUDAL; PERINEURAL
Status: COMPLETED | OUTPATIENT
Start: 2021-12-16 | End: 2021-12-16

## 2021-12-16 RX ORDER — TRIAMCINOLONE ACETONIDE 40 MG/ML
80 INJECTION, SUSPENSION INTRA-ARTICULAR; INTRAMUSCULAR
Status: COMPLETED | OUTPATIENT
Start: 2021-12-16 | End: 2021-12-16

## 2021-12-16 RX ADMIN — BUPIVACAINE HYDROCHLORIDE 3 ML: 2.5 INJECTION, SOLUTION EPIDURAL; INFILTRATION; INTRACAUDAL at 09:24

## 2021-12-16 RX ADMIN — LIDOCAINE HYDROCHLORIDE 3 ML: 10 INJECTION, SOLUTION EPIDURAL; INFILTRATION; INTRACAUDAL; PERINEURAL at 09:24

## 2021-12-16 RX ADMIN — TRIAMCINOLONE ACETONIDE 80 MG: 40 INJECTION, SUSPENSION INTRA-ARTICULAR; INTRAMUSCULAR at 09:24

## 2021-12-16 NOTE — PROGRESS NOTES
Procedure   Large Joint Arthrocentesis: R greater trochanteric bursa  Date/Time: 12/16/2021 9:24 AM  Consent given by: patient  Site marked: site marked  Timeout: Immediately prior to procedure a time out was called to verify the correct patient, procedure, equipment, support staff and site/side marked as required   Supporting Documentation  Indications: pain   Procedure Details  Location: hip - R greater trochanteric bursa  Preparation: Patient was prepped and draped in the usual sterile fashion  Needle size: 22 G  Approach: anterolateral  Medications administered: 80 mg triamcinolone acetonide 40 MG/ML; 3 mL lidocaine PF 1% 1 %; 3 mL bupivacaine (PF) 0.25 %  Patient tolerance: patient tolerated the procedure well with no immediate complications

## 2021-12-16 NOTE — PROGRESS NOTES
Orthopaedic Clinic Note: Hip Established Patient    Chief Complaint   Patient presents with   • Follow-up     Trochanteric bursitis of right hip; cortisone injection 2021        HPI    It has been 5  month(s) since Ms. Prescott's last visit. She returns to clinic today for follow-up right hip pain.  She underwent trochanteric bursa cortisone injection 5 months ago.  The injection worked well for about 4-1/2 months.  Her pain is gradually returned.  She comes clinic today complaining of recurrent lateral based hip pain that has gotten progressively worse over the past 3 weeks.  She rates her pain 6/10 on the pain scale.  She is ambulating with no assistive device.  She denies fevers chills or constitutional symptoms.  Overall she is doing about the same.    Past Medical History:   Diagnosis Date   • Allergic rhinitis    • Breast cancer (HCC) 2010    Right -Taxotere and Cytoxan ×4.  Tamoxifen.  Mastectomy   • Breast cancer (HCC)    • Cataract    • Chronic anxiety Adulthood    Good response to citalopram   • Chronic constipation Adulthood    MOM or MiraLAX   • Chronic fatigue    • Depression     Good response to amitriptyline and citalopram   • Diverticulosis 2017    Per colonoscopy   • Dry eye syndrome 2010    Moisturizers   • Heart murmur Mitrovalve   • Herpes zoster    • History of exposure to tuberculosis Remote    PPDs  onward all negative   • Hypertension    • Impacted cerumen    • Irritable bowel syndrome    • MVP (mitral valve prolapse)     Recurring tachycardias   • Nephrolithiasis    • Plantar fasciitis    • Sarcoidosis     Active in eyes and lungs   • Sleep disturbances     Good response to amitriptyline and trazodone   • Uterine bleeding     Uterine ablation for bleeding and chronic iron deficiency      Past Surgical History:   Procedure Laterality Date   • BREAST BIOPSY Right     Benign   • CATARACT EXTRACTION Bilateral    •  SECTION   &    •  COLONOSCOPY  , 2017    Diverticulosis only.   • COLONOSCOPY W/ POLYPECTOMY  Remote   • DILATATION AND CURETTAGE  2016   • ENDOMETRIAL ABLATION     • HYSTEROSCOPY ENDOMETRIAL ABLATION  2006    persistant bleeding   • MASTECTOMY Bilateral 2010    bilateral with implants, breast cancer on the right      Family History   Problem Relation Age of Onset   • Alzheimer's disease Mother          age 89   • Hypothyroidism Mother    • Osteoporosis Mother    • Pulmonary embolism Mother    • Coronary artery disease Father    • Dementia Father    • Diabetes Father    • Hypertension Father    • Heart attack Father    • Hypothyroidism Sister    • Hypertension Sister    • Breast cancer Maternal Grandmother    • Stomach cancer Paternal Aunt    • Lung cancer Paternal Aunt      Social History     Socioeconomic History   • Marital status:      Spouse name: Woody   Tobacco Use   • Smoking status: Never Smoker   • Smokeless tobacco: Never Used   Vaping Use   • Vaping Use: Never used   Substance and Sexual Activity   • Alcohol use: Yes     Alcohol/week: 5.0 standard drinks     Types: 5 Glasses of wine per week   • Drug use: No   • Sexual activity: Not Currently     Partners: Male      Current Outpatient Medications on File Prior to Visit   Medication Sig Dispense Refill   • Calcium Carbonate (Calcium 600) 1500 (600 Ca) MG tablet Calcium 600-D3 Plus (mag-zinc)   daily     • cycloSPORINE (RESTASIS) 0.05 % ophthalmic emulsion Apply 1 drop to eye Every 12 (Twelve) Hours.     • diclofenac (VOLTAREN) 1 % gel gel Apply 4 g topically to the appropriate area as directed 4 (Four) Times a Day As Needed (hip pain). 100 g 1   • dicyclomine (BENTYL) 20 MG tablet Take 1 tablet by mouth Every 6 (Six) Hours. 120 tablet 11   • enalapril (VASOTEC) 20 MG tablet TAKE 1/2 TABLET TWICE DAILY 90 tablet 1   • fexofenadine (ALLEGRA) 180 MG tablet Take 1 tablet by mouth Daily As Needed.     • hydrOXYzine (ATARAX) 25 MG tablet Take 0.5-1 tablets by mouth  "Every 6 (Six) Hours As Needed for Anxiety. 60 tablet 3   • ibuprofen (ADVIL,MOTRIN) 400 MG tablet Take 1 tablet by mouth 2 (Two) Times a Day As Needed for Mild Pain . 180 tablet 1   • lubiprostone (Amitiza) 24 MCG capsule Take 1 capsule by mouth 2 (Two) Times a Day With Meals. 60 capsule 5   • Multiple Vitamin (MULTIVITAMIN) capsule Take  by mouth daily.     • polyvinyl alcohol-povidone (REFRESH) 1.4-0.6 % ophthalmic solution Apply 1 drop to eye 2 (two) times a day.     • sertraline (ZOLOFT) 100 MG tablet Take 1 tablet by mouth Daily. 90 tablet 3   • simethicone (MYLICON) 125 MG chewable tablet Chew 125 mg Every 6 (Six) Hours As Needed for Flatulence.     • traZODone (DESYREL) 100 MG tablet Take 1 tablet by mouth every night at bedtime. 90 tablet 3     No current facility-administered medications on file prior to visit.      Allergies   Allergen Reactions   • Penicillins Rash   • Sulfa Antibiotics Rash        Review of Systems   Constitutional: Negative.    HENT: Negative.    Eyes: Negative.    Respiratory: Negative.    Cardiovascular: Negative.    Gastrointestinal: Positive for constipation.   Endocrine: Negative.    Genitourinary: Positive for pelvic pain.   Musculoskeletal: Positive for arthralgias.   Skin: Negative.    Allergic/Immunologic: Positive for environmental allergies.   Neurological: Negative.    Hematological: Negative.    Psychiatric/Behavioral: Negative.         The patient's Review of Systems was personally reviewed and confirmed as accurate.    Physical Exam  Blood pressure 128/78, height 162.6 cm (64.02\"), weight 61.3 kg (135 lb 2.3 oz), not currently breastfeeding.    Body mass index is 23.19 kg/m².    GENERAL APPEARANCE: awake, alert, oriented, in no acute distress and well developed, well nourished  LUNGS:  breathing nonlabored  EXTREMITIES: no clubbing, cyanosis  PERIPHERAL PULSES: palpable dorsalis pedis and posterior tibial pulses bilaterally.    GAIT:  Normal            Hip Exam:  " Right    RANGE OF MOTION:  EXTENSION/FLEXION:  normal (0-110 degrees)  IR (at 90 degrees of flexion):  20  ER (at 90 degrees of flexion):  40  PAIN WITH HIP MOTION:  no  PAIN WITH LOGROLL:  no     STINCHFIELD TEST: negative    STRENGTH:  ABDUCTOR:  5/5  ADDUCTOR:  5/5  HIP FLEXION:  5/5    GREATER TROCHANTER BURSAL PAIN:  yes    SENSATION TO LIGHT TOUCH:  DEEP PERONEAL/SUPERFICIAL PERONEAL/SURAL/SAPHENOUS/TIBIAL:   intact    EDEMA:  no  ERYTHEMA:  no  WOUNDS/INCISIONS:   no  _________________________________________________________________  _________________________________________________________________    RADIOGRAPHIC FINDINGS:   No new imaging today    Assessment/Plan:   Diagnosis Plan   1. Trochanteric bursitis of right hip  Ambulatory Referral to Physical Therapy Evaluate and treat     Patient is experiencing recurrent trochanteric bursitis.  I discussed treatment options with her regarding her ongoing hip pain.  She is agreeable to trochanteric bursa cortisone injection today as well as referral to physical therapy.  She will follow-up as needed.    Procedure Note:  I discussed with the patient the potential benefits of performing a therapeutic injection of the right hip trochanteric bursa as well as potential risks including but not limited to infection, swelling, pain, bleeding, bruising, nerve/vessel damage, skin color changes, transient elevation in blood glucose levels, and fat atrophy. After informed consent and verifying correct patient, procedure site, and type of procedure, the area was prepped with alcohol, ethyl chloride was used to numb the skin. Via the direct lateral approach, 3cc of 1% lidocaine, 3cc of 0.25% bupivicaine and 2 cc of 40mg/ml of Kenalog were injected into the right hip trochanteric bursa. The patient tolerated the procedure well. There were no complications. A sterile dressing was placed over the injection site.      Dao Stover MD  12/16/21  09:26 EST

## 2021-12-21 ENCOUNTER — TELEPHONE (OUTPATIENT)
Dept: FAMILY MEDICINE CLINIC | Facility: CLINIC | Age: 66
End: 2021-12-21

## 2021-12-21 DIAGNOSIS — G47.9 DYSSOMNIA: ICD-10-CM

## 2021-12-21 RX ORDER — TRAZODONE HYDROCHLORIDE 100 MG/1
100 TABLET ORAL
Qty: 90 TABLET | Refills: 3 | Status: SHIPPED | OUTPATIENT
Start: 2021-12-21 | End: 2022-08-15 | Stop reason: SDUPTHER

## 2021-12-21 RX ORDER — ENALAPRIL MALEATE 20 MG/1
10 TABLET ORAL 2 TIMES DAILY
Qty: 90 TABLET | Refills: 1 | Status: SHIPPED | OUTPATIENT
Start: 2021-12-21 | End: 2022-06-09

## 2021-12-21 NOTE — TELEPHONE ENCOUNTER
Rx Refill Note  Requested Prescriptions     Pending Prescriptions Disp Refills   • enalapril (VASOTEC) 20 MG tablet 90 tablet 1     Sig: Take 0.5 tablets by mouth 2 (Two) Times a Day.   • traZODone (DESYREL) 100 MG tablet 90 tablet 3     Sig: Take 1 tablet by mouth every night at bedtime.      Last office visit with prescribing clinician: 8/3/2021      Next office visit with prescribing clinician: 2/28/2022            Pily Shearer MA  12/21/21, 14:41 EST

## 2021-12-21 NOTE — TELEPHONE ENCOUNTER
Caller: KenyonEtelvina bean    Relationship: Self    Best call back number: [phone]    Requested Prescriptions:   Requested Prescriptions     Pending Prescriptions Disp Refills   • enalapril (VASOTEC) 20 MG tablet 90 tablet 1     Sig: Take 0.5 tablets by mouth 2 (Two) Times a Day.   • traZODone (DESYREL) 100 MG tablet 90 tablet 3     Sig: Take 1 tablet by mouth every night at bedtime.        Pharmacy where request should be sent: [facility] - [place] [id number] - [phone] [facility] [phone]      Additional details provided by patient: NEEDS TO HAVE A  NEW PRESCRIPTION SENT TO THIS PHARMACY    Does the patient have less than a 3 day supply:  [] Yes  [x] No    Viviana Phillips MA   12/21/21 13:44 EST

## 2022-01-07 ENCOUNTER — DOCUMENTATION (OUTPATIENT)
Dept: PHYSICAL THERAPY | Facility: CLINIC | Age: 67
End: 2022-01-07

## 2022-01-07 NOTE — PROGRESS NOTES
Discharge Summary  Discharge Summary from Physical Therapy Report      Date of Initial Visit: Type: THERAPY  Noted: 4/7/2021  Today's Date: 1/7/2022  Patient seen for 10 sessions    Discharge Status of Patient: See PT Note dated 07/1/2021    Goals: Partially Met    Pt was doing well and attempted to maintain current status on own without need for further PT. Pt was placed on hold to determine this and did not need to return to PT. Pt discharged.       Date of Discharge 08/31/2021        Kristian Jaquez PT  Physical Therapist  [place] License # [id number]

## 2022-01-11 ENCOUNTER — OUTSIDE FACILITY SERVICE (OUTPATIENT)
Dept: GASTROENTEROLOGY | Facility: CLINIC | Age: 67
End: 2022-01-11

## 2022-01-11 PROCEDURE — G0105 COLORECTAL SCRN; HI RISK IND: HCPCS | Performed by: INTERNAL MEDICINE

## 2022-02-28 ENCOUNTER — OFFICE VISIT (OUTPATIENT)
Dept: FAMILY MEDICINE CLINIC | Facility: CLINIC | Age: 67
End: 2022-02-28

## 2022-02-28 VITALS
BODY MASS INDEX: 22.91 KG/M2 | HEIGHT: 64 IN | DIASTOLIC BLOOD PRESSURE: 64 MMHG | OXYGEN SATURATION: 95 % | SYSTOLIC BLOOD PRESSURE: 122 MMHG | WEIGHT: 134.2 LBS | TEMPERATURE: 97.8 F | HEART RATE: 82 BPM

## 2022-02-28 DIAGNOSIS — K59.04 CHRONIC IDIOPATHIC CONSTIPATION: ICD-10-CM

## 2022-02-28 DIAGNOSIS — F41.8 SITUATIONAL ANXIETY: Primary | ICD-10-CM

## 2022-02-28 DIAGNOSIS — F51.01 PRIMARY INSOMNIA: ICD-10-CM

## 2022-02-28 DIAGNOSIS — I10 ESSENTIAL HYPERTENSION: Chronic | ICD-10-CM

## 2022-02-28 PROCEDURE — 99214 OFFICE O/P EST MOD 30 MIN: CPT | Performed by: FAMILY MEDICINE

## 2022-02-28 RX ORDER — HYDROXYZINE HYDROCHLORIDE 25 MG/1
25-50 TABLET, FILM COATED ORAL EVERY 6 HOURS PRN
Qty: 60 TABLET | Refills: 3 | Status: SHIPPED | OUTPATIENT
Start: 2022-02-28

## 2022-02-28 NOTE — PROGRESS NOTES
"Chief Complaint  Hypertension (6 month follow up )    Subjective          Etelvina Prescott presents to [facility]  Hypertension  This is a chronic problem. The problem is controlled. Current antihypertension treatment includes ACE inhibitors.     She had colonoscopy which was fine. She tired medications but symptoms not cleared up. Last two days had RLQ pain. Last year GYN had ultrasound and was normal. Occasional nausea. Continues to have constipation with miralax and gas-x. She thought could be to nerves. Stressful life with  MS and wheelchair bound. Sleep has been fine, trouble going to sleep.     She has red/purple skin lesions on left hand and forearm. Been over a year since dermatology exam.    She is interested in counseling. She went to [facility] one visit. She has taken very few hydroxyzine, not much effect.         Objective   Vital Signs:   /64   Pulse 82   Temp 97.8 °F (36.6 °C)   Ht 162.6 cm (64.02\")   Wt 60.9 kg (134 lb 3.2 oz)   SpO2 95%   BMI 23.02 kg/m²     Physical Exam  Vitals reviewed.   Constitutional:       General: She is not in acute distress.     Appearance: She is not ill-appearing.   Cardiovascular:      Rate and Rhythm: Normal rate and regular rhythm.   Pulmonary:      Effort: Pulmonary effort is normal.      Breath sounds: Normal breath sounds.   Skin:     Comments: Left hand to round nonblanching purple macules.  Linear pattern of nonblanching erythematous macules left forearm   Neurological:      Mental Status: She is alert.        Result Review :            Office Visit with Reddy Timmons APRN (11/19/2021)  SCANNED - COLONOSCOPY (01/11/2022)       Assessment and Plan    Diagnoses and all orders for this visit:    1. Situational anxiety (Primary)  -     Ambulatory Referral to Behavioral Health  -     hydrOXYzine (ATARAX) 25 MG tablet; Take 1-2 tablets by mouth Every 6 (Six) Hours As Needed for Anxiety.  " Dispense: 60 tablet; Refill: 3  Uncontrolled.  Increase hydroxyzine to up to 50 mg as needed and cautioned on sedation.  Continue Zoloft.  Start counseling at [facility].  2. Primary insomnia  Recommend taking her trazodone prescription at least 30 minutes to an hour before bedtime.  3. Essential hypertension  Stable.  Continue enalapril.  4. Chronic idiopathic constipation  Symptoms mostly controlled.  Continue follow-up care with gastroenterology.      Follow Up   Return in about 24 weeks (around 8/15/2022) for MWV and fasting labs, as scheduled.  Patient was given instructions and counseling regarding her condition or for health maintenance advice. Please see specific information pulled into the AVS if appropriate.     Electronically signed by Celina Villegas MD, 02/28/22, 12:02 PM EST.

## 2022-03-07 ENCOUNTER — OFFICE VISIT (OUTPATIENT)
Dept: GASTROENTEROLOGY | Facility: CLINIC | Age: 67
End: 2022-03-07

## 2022-03-07 VITALS
HEART RATE: 83 BPM | BODY MASS INDEX: 22.71 KG/M2 | WEIGHT: 133 LBS | HEIGHT: 64 IN | SYSTOLIC BLOOD PRESSURE: 133 MMHG | DIASTOLIC BLOOD PRESSURE: 65 MMHG

## 2022-03-07 DIAGNOSIS — R10.31 RIGHT LOWER QUADRANT ABDOMINAL PAIN: ICD-10-CM

## 2022-03-07 DIAGNOSIS — K59.04 CHRONIC IDIOPATHIC CONSTIPATION: Primary | ICD-10-CM

## 2022-03-07 PROCEDURE — 99213 OFFICE O/P EST LOW 20 MIN: CPT | Performed by: NURSE PRACTITIONER

## 2022-03-07 NOTE — PROGRESS NOTES
GASTROENTEROLOGY OFFICE NOTE  Etelvina Prescott  [id number]  1955    CARE TEAM  Patient Care Team:  Celina Campos MD as PCP - General (Family Medicine)  Rikki Davis MD as Consulting Physician (Urology)  Dao Stover MD as Consulting Physician (Orthopedic Surgery)    Referring Provider: Celina Campos MD    Chief Complaint   Patient presents with   • Constipation     3 month follow up        HISTORY OF PRESENT ILLNESS:  Ms. Prescott presents in follow-up with constipation.  She has been treated previously with Trulance, Linzess and Amitiza; all of which gave her diarrhea.  She has worked with the FODMAP diet a little and has found that meds seem to make her constipation worse.  Currently she is taking MiraLAX, if she takes it on a daily basis this to gives her diarrhea so she takes it every 2 to 3 days and is also utilizing prune juice and probiotic yogurt intermittently.  Her constipation is somewhat better.  She continues to have right lower quadrant abdominal pain and cramps on occasion.    In January she underwent colonoscopy which was normal with the exception of diverticulosis.  She did have some sharp angular turns in her colon which may be contributing to her constipation.  Overall, she is relieved that there is nothing worrisome.    PAST MEDICAL HISTORY  Past Medical History:   Diagnosis Date   • Allergic rhinitis    • Breast cancer (HCC) 2010    Right -Taxotere and Cytoxan ×4.  Tamoxifen.  Mastectomy   • Breast cancer (HCC)    • Cataract 2021   • Chronic anxiety Adulthood    Good response to citalopram   • Chronic constipation Adulthood    MOM or MiraLAX   • Chronic fatigue    • Depression 1990    Good response to amitriptyline and citalopram   • Diverticulosis 2017    Per colonoscopy   • Dry eye syndrome 2010    Moisturizers   • Heart murmur Mitrovalve   • Herpes zoster    • History of exposure to tuberculosis Remote    PPDs 1993 onward all negative   •  Hypertension    • Impacted cerumen    • Irritable bowel syndrome    • MVP (mitral valve prolapse)     Recurring tachycardias   • Nephrolithiasis    • Plantar fasciitis    • Sarcoidosis     Active in eyes and lungs   • Sleep disturbances     Good response to amitriptyline and trazodone   • Uterine bleeding 2006    Uterine ablation for bleeding and chronic iron deficiency        PAST SURGICAL HISTORY  Past Surgical History:   Procedure Laterality Date   • BREAST BIOPSY Right     Benign   • CATARACT EXTRACTION Bilateral    •  SECTION   &    • COLONOSCOPY  ,     Diverticulosis only.   • COLONOSCOPY W/ POLYPECTOMY  Remote   • DILATATION AND CURETTAGE     • ENDOMETRIAL ABLATION     • HYSTEROSCOPY ENDOMETRIAL ABLATION  2006    persistant bleeding   • MASTECTOMY Bilateral     bilateral with implants, breast cancer on the right        MEDICATIONS:    Current Outpatient Medications:   •  betamethasone valerate (VALISONE) 0.1 % cream, betamethasone valerate 0.1 % topical cream  Apply to bilateral ear canals gently with a qtip 1-2 x weekly, Disp: , Rfl:   •  Calcium Carbonate (Calcium 600) 1500 (600 Ca) MG tablet, Calcium 600-D3 Plus (mag-zinc)  daily, Disp: , Rfl:   •  cycloSPORINE (RESTASIS) 0.05 % ophthalmic emulsion, Apply 1 drop to eye Every 12 (Twelve) Hours., Disp: , Rfl:   •  diclofenac (VOLTAREN) 1 % gel gel, Apply 4 g topically to the appropriate area as directed 4 (Four) Times a Day As Needed (hip pain)., Disp: 100 g, Rfl: 1  •  dicyclomine (BENTYL) 20 MG tablet, Take 1 tablet by mouth Every 6 (Six) Hours. (Patient taking differently: Take 20 mg by mouth As Needed.), Disp: 120 tablet, Rfl: 11  •  enalapril (VASOTEC) 20 MG tablet, Take 0.5 tablets by mouth 2 (Two) Times a Day., Disp: 90 tablet, Rfl: 1  •  fexofenadine (ALLEGRA) 180 MG tablet, Take 1 tablet by mouth Daily As Needed., Disp: , Rfl:   •  hydrOXYzine (ATARAX) 25 MG tablet, Take 1-2 tablets by  "mouth Every 6 (Six) Hours As Needed for Anxiety., Disp: 60 tablet, Rfl: 3  •  ibuprofen (ADVIL,MOTRIN) 400 MG tablet, Take 1 tablet by mouth 2 (Two) Times a Day As Needed for Mild Pain ., Disp: 180 tablet, Rfl: 1  •  Multiple Vitamin (MULTIVITAMIN) capsule, Take  by mouth daily., Disp: , Rfl:   •  polyvinyl alcohol-povidone (REFRESH) 1.4-0.6 % ophthalmic solution, Apply 1 drop to eye 2 (two) times a day., Disp: , Rfl:   •  sertraline (ZOLOFT) 100 MG tablet, Take 1 tablet by mouth Daily., Disp: 90 tablet, Rfl: 3  •  simethicone (MYLICON) 125 MG chewable tablet, Chew 125 mg Every 6 (Six) Hours As Needed for Flatulence., Disp: , Rfl:   •  traZODone (DESYREL) 100 MG tablet, Take 1 tablet by mouth every night at bedtime., Disp: 90 tablet, Rfl: 3    ALLERGIES  Allergies   Allergen Reactions   • Penicillins Rash   • Sulfa Antibiotics Rash       FAMILY HISTORY:  Family History   Problem Relation Age of Onset   • Alzheimer's disease Mother          age 89   • Hypothyroidism Mother    • Osteoporosis Mother    • Pulmonary embolism Mother    • Coronary artery disease Father    • Dementia Father    • Diabetes Father    • Hypertension Father    • Heart attack Father    • Hypothyroidism Sister    • Hypertension Sister    • Breast cancer Maternal Grandmother    • Stomach cancer Paternal Aunt    • Lung cancer Paternal Aunt        SOCIAL HISTORY  Social History     Socioeconomic History   • Marital status:      Spouse name: Woody   Tobacco Use   • Smoking status: Never Smoker   • Smokeless tobacco: Never Used   Vaping Use   • Vaping Use: Never used   Substance and Sexual Activity   • Alcohol use: Yes     Alcohol/week: 5.0 standard drinks     Types: 5 Glasses of wine per week   • Drug use: No   • Sexual activity: Not Currently     Partners: Male           PHYSICAL EXAM   /65   Pulse 83   Ht 162.6 cm (64\")   Wt 60.3 kg (133 lb)   BMI 22.83 kg/m²   Physical Exam  Constitutional:       Appearance: Normal appearance. "   HENT:      Head: Normocephalic and atraumatic.   Pulmonary:      Effort: Pulmonary effort is normal.   Neurological:      Mental Status: She is alert and oriented to person, place, and time.   Psychiatric:         Mood and Affect: Mood normal.         Thought Content: Thought content normal.           ASSESSMENT / PLAN  1.  Constipation  2.  Right lower quadrant abdominal pain  -FODMAP diet  -MiraLAX 17 g every 2 to 3 days    Return if symptoms worsen or fail to improve.    I discussed the patients findings and my recommendations with patient    MORE Crawford

## 2022-03-24 DIAGNOSIS — F41.9 ANXIETY: ICD-10-CM

## 2022-03-24 DIAGNOSIS — F33.41 RECURRENT MAJOR DEPRESSIVE DISORDER, IN PARTIAL REMISSION: ICD-10-CM

## 2022-03-24 RX ORDER — SERTRALINE HYDROCHLORIDE 100 MG/1
TABLET, FILM COATED ORAL
Qty: 90 TABLET | Refills: 1 | Status: SHIPPED | OUTPATIENT
Start: 2022-03-24 | End: 2022-08-15 | Stop reason: SDUPTHER

## 2022-03-24 NOTE — TELEPHONE ENCOUNTER
Rx Refill Note  Requested Prescriptions     Pending Prescriptions Disp Refills   • sertraline (ZOLOFT) 100 MG tablet [Pharmacy Med Name: SERTRALINE  MG TABLET] 90 tablet 3     Sig: TAKE ONE TABLET BY MOUTH DAILY      Last office visit with prescribing clinician: 2/28/22     Next office visit with prescribing clinician: 8/15/22            Tika German MA  03/24/22, 07:36 EDT

## 2022-04-04 ENCOUNTER — TELEPHONE (OUTPATIENT)
Dept: FAMILY MEDICINE CLINIC | Facility: CLINIC | Age: 67
End: 2022-04-04

## 2022-04-04 NOTE — TELEPHONE ENCOUNTER
Caller: Etelvina Prescott    Relationship: Self    Best call back number:[phone]     What medication are you requesting: ANTIBIOTIC    What are your current symptoms: EAR PAIN, HEADACHE, SCRATCHY THROAT, STUFFY NOSE    How long have you been experiencing symptoms: 2 TO 3 DAYS    Have you had these symptoms before:    [x] Yes  [] No    Have you been treated for these symptoms before:   [] Yes  [x] No    If a prescription is needed, what is your preferred pharmacy and phone number: [address] [phone] [facility] [phone]      Additional notes:

## 2022-04-04 NOTE — TELEPHONE ENCOUNTER
Contacted patient to schedule an upcoming appt, she declined and will be seen as a walk in a [facility]

## 2022-06-09 RX ORDER — ENALAPRIL MALEATE 20 MG/1
TABLET ORAL
Qty: 90 TABLET | Refills: 1 | Status: SHIPPED | OUTPATIENT
Start: 2022-06-09 | End: 2023-02-22 | Stop reason: SDUPTHER

## 2022-06-09 NOTE — TELEPHONE ENCOUNTER
Rx Refill Note  Requested Prescriptions     Pending Prescriptions Disp Refills   • enalapril (VASOTEC) 20 MG tablet [Pharmacy Med Name: ENALAPRIL MALEATE 20 MG Tablet] 90 tablet 1     Sig: TAKE 1/2 TABLET TWICE DAILY      Last office visit with prescribing clinician: 2/28/2022      Next office visit with prescribing clinician: 8/15/2022            Iza Pino MA  06/09/22, 13:42 EDT

## 2022-08-15 ENCOUNTER — LAB (OUTPATIENT)
Dept: LAB | Facility: HOSPITAL | Age: 67
End: 2022-08-15

## 2022-08-15 ENCOUNTER — OFFICE VISIT (OUTPATIENT)
Dept: FAMILY MEDICINE CLINIC | Facility: CLINIC | Age: 67
End: 2022-08-15

## 2022-08-15 VITALS
HEIGHT: 64 IN | SYSTOLIC BLOOD PRESSURE: 130 MMHG | WEIGHT: 129 LBS | OXYGEN SATURATION: 98 % | BODY MASS INDEX: 22.02 KG/M2 | DIASTOLIC BLOOD PRESSURE: 72 MMHG | HEART RATE: 88 BPM

## 2022-08-15 DIAGNOSIS — I10 ESSENTIAL HYPERTENSION: Chronic | ICD-10-CM

## 2022-08-15 DIAGNOSIS — F41.8 SITUATIONAL ANXIETY: ICD-10-CM

## 2022-08-15 DIAGNOSIS — R10.31 RIGHT INGUINAL PAIN: ICD-10-CM

## 2022-08-15 DIAGNOSIS — R35.0 URINARY FREQUENCY: ICD-10-CM

## 2022-08-15 DIAGNOSIS — M16.0 PRIMARY OSTEOARTHRITIS OF BOTH HIPS: ICD-10-CM

## 2022-08-15 DIAGNOSIS — E53.8 COBALAMIN DEFICIENCY: Chronic | ICD-10-CM

## 2022-08-15 DIAGNOSIS — Z23 IMMUNIZATION DUE: ICD-10-CM

## 2022-08-15 DIAGNOSIS — F33.41 RECURRENT MAJOR DEPRESSIVE DISORDER, IN PARTIAL REMISSION: ICD-10-CM

## 2022-08-15 DIAGNOSIS — Z00.00 MEDICARE ANNUAL WELLNESS VISIT, SUBSEQUENT: Primary | ICD-10-CM

## 2022-08-15 DIAGNOSIS — E55.9 VITAMIN D DEFICIENCY: Chronic | ICD-10-CM

## 2022-08-15 DIAGNOSIS — G47.9 DYSSOMNIA: ICD-10-CM

## 2022-08-15 DIAGNOSIS — Z13.0 SCREENING FOR DEFICIENCY ANEMIA: ICD-10-CM

## 2022-08-15 LAB
25(OH)D3+25(OH)D2 SERPL-MCNC: 42.3 NG/ML (ref 30–100)
ALBUMIN SERPL-MCNC: 4.9 G/DL (ref 3.5–5.2)
ALBUMIN/GLOB SERPL: 2.1 G/DL
ALP SERPL-CCNC: 78 U/L (ref 39–117)
ALT SERPL-CCNC: 13 U/L (ref 1–33)
AST SERPL-CCNC: 17 U/L (ref 1–32)
BILIRUB SERPL-MCNC: 0.5 MG/DL (ref 0–1.2)
BUN SERPL-MCNC: 16 MG/DL (ref 8–23)
BUN/CREAT SERPL: 20.8 (ref 7–25)
CALCIUM SERPL-MCNC: 10.1 MG/DL (ref 8.6–10.5)
CHLORIDE SERPL-SCNC: 101 MMOL/L (ref 98–107)
CHOLEST SERPL-MCNC: 207 MG/DL (ref 0–200)
CO2 SERPL-SCNC: 26.5 MMOL/L (ref 22–29)
CREAT SERPL-MCNC: 0.77 MG/DL (ref 0.57–1)
EGFRCR-CYS SERPLBLD CKD-EPI 2021: 83.6 ML/MIN/1.73
ERYTHROCYTE [DISTWIDTH] IN BLOOD BY AUTOMATED COUNT: 12.6 % (ref 12.3–15.4)
GLOBULIN SER CALC-MCNC: 2.3 GM/DL
GLUCOSE SERPL-MCNC: 93 MG/DL (ref 65–99)
HCT VFR BLD AUTO: 41.9 % (ref 34–46.6)
HDLC SERPL-MCNC: 81 MG/DL (ref 40–60)
HGB BLD-MCNC: 14 G/DL (ref 12–15.9)
LDLC SERPL CALC-MCNC: 111 MG/DL (ref 0–100)
MCH RBC QN AUTO: 30.8 PG (ref 26.6–33)
MCHC RBC AUTO-ENTMCNC: 33.4 G/DL (ref 31.5–35.7)
MCV RBC AUTO: 92.3 FL (ref 79–97)
PLATELET # BLD AUTO: 265 10*3/MM3 (ref 140–450)
POTASSIUM SERPL-SCNC: 4.3 MMOL/L (ref 3.5–5.2)
PROT SERPL-MCNC: 7.2 G/DL (ref 6–8.5)
RBC # BLD AUTO: 4.54 10*6/MM3 (ref 3.77–5.28)
SODIUM SERPL-SCNC: 140 MMOL/L (ref 136–145)
TRIGL SERPL-MCNC: 88 MG/DL (ref 0–150)
TSH SERPL DL<=0.005 MIU/L-ACNC: 0.96 UIU/ML (ref 0.27–4.2)
VIT B12 SERPL-MCNC: 652 PG/ML (ref 211–946)
VLDLC SERPL CALC-MCNC: 15 MG/DL (ref 5–40)
WBC # BLD AUTO: 6.32 10*3/MM3 (ref 3.4–10.8)

## 2022-08-15 PROCEDURE — 96160 PT-FOCUSED HLTH RISK ASSMT: CPT | Performed by: FAMILY MEDICINE

## 2022-08-15 PROCEDURE — 1125F AMNT PAIN NOTED PAIN PRSNT: CPT | Performed by: FAMILY MEDICINE

## 2022-08-15 PROCEDURE — 90677 PCV20 VACCINE IM: CPT | Performed by: FAMILY MEDICINE

## 2022-08-15 PROCEDURE — 99397 PER PM REEVAL EST PAT 65+ YR: CPT | Performed by: FAMILY MEDICINE

## 2022-08-15 PROCEDURE — G0439 PPPS, SUBSEQ VISIT: HCPCS | Performed by: FAMILY MEDICINE

## 2022-08-15 PROCEDURE — 1170F FXNL STATUS ASSESSED: CPT | Performed by: FAMILY MEDICINE

## 2022-08-15 PROCEDURE — 1159F MED LIST DOCD IN RCRD: CPT | Performed by: FAMILY MEDICINE

## 2022-08-15 PROCEDURE — G0009 ADMIN PNEUMOCOCCAL VACCINE: HCPCS | Performed by: FAMILY MEDICINE

## 2022-08-15 RX ORDER — TRAZODONE HYDROCHLORIDE 100 MG/1
100 TABLET ORAL
Qty: 90 TABLET | Refills: 3 | Status: SHIPPED | OUTPATIENT
Start: 2022-08-15

## 2022-08-15 RX ORDER — SERTRALINE HYDROCHLORIDE 100 MG/1
150 TABLET, FILM COATED ORAL DAILY
Qty: 135 TABLET | Refills: 3 | Status: SHIPPED | OUTPATIENT
Start: 2022-08-15 | End: 2022-10-10 | Stop reason: SDUPTHER

## 2022-08-15 NOTE — PATIENT INSTRUCTIONS
Advance Care Planning and Advance Directives     You make decisions on a daily basis - decisions about where you want to live, your career, your home, your life. Perhaps one of the most important decisions you face is your choice for future medical care. Take time to talk with your family and your healthcare team and start planning today.  Advance Care Planning is a process that can help you:  Understand possible future healthcare decisions in light of your own experiences  Reflect on those decision in light of your goals and values  Discuss your decisions with those closest to you and the healthcare professionals that care for you  Make a plan by creating a document that reflects your wishes    Surrogate Decision Maker  In the event of a medical emergency, which has left you unable to communicate or to make your own decisions, you would need someone to make decisions for you.  It is important to discuss your preferences for medical treatment with this person while you are in good health.     Qualities of a surrogate decision maker:  Willing to take on this role and responsibility  Knows what you want for future medical care  Willing to follow your wishes even if they don't agree with them  Able to make difficult medical decisions under stressful circumstances    Advance Directives  These are legal documents you can create that will guide your healthcare team and decision maker(s) when needed. These documents can be stored in the electronic medical record.    Living Will - a legal document to guide your care if you have a terminal condition or a serious illness and are unable to communicate. States vary by statute in document names/types, but most forms may include one or more of the following:        -  Directions regarding life-prolonging treatments        -  Directions regarding artificially provided nutrition/hydration        -  Choosing a healthcare decision maker        -  Direction regarding organ/tissue  donation    Durable Power of  for Healthcare - this document names an -in-fact to make medical decisions for you, but it may also allow this person to make personal and financial decisions for you. Please seek the advice of an  if you need this type of document.    **Advance Directives are not required and no one may discriminate against you if you do not sign one.    Medical Orders  Many states allow specific forms/orders signed by your physician to record your wishes for medical treatment in your current state of health. This form, signed in personal communication with your physician, addresses resuscitation and other medical interventions that you may or may not want.      For more information or to schedule a time with a [facility] Advance Care Planning Facilitator contact: [facility] or call [phone] and someone will contact you directly.  You are due for Shingrix vaccination series ( the newest shingles vaccine).  It is a two shot series spaced 2-6 months apart. Please get this vaccine series started at your earliest convenience at your local pharmacy to help avoid shingles outbreak. It is more effective than the old Zostavax vaccine and is recommended even if you have had the Zostavax vaccine in the past.  Once the Shingrix series is completed, it does not need to be repeated.   For more information, please look at the website below:  [facility] Shingrix Vaccine Information      Medicare Wellness  Personal Prevention Plan of Service     Date of Office Visit:    Encounter Provider:  Celina Villegas MD  Place of Service:  [facility]  Patient Name: Etelvina Prescott  :  1955    As part of the Medicare Wellness portion of your visit today, we are providing you with this personalized preventive plan of services (PPPS). This plan is based upon recommendations of the [place] Preventive Services Task Force (USPSTF) and the  Advisory Committee on Immunization Practices (ACIP).    This lists the preventive care services that should be considered, and provides dates of when you are due. Items listed as completed are up-to-date and do not require any further intervention.    Health Maintenance   Topic Date Due    ZOSTER VACCINE (2 of 3) 04/07/2014    Pneumococcal Vaccine 65+ (2 - PCV) 05/03/2019    PAP SMEAR  06/28/2021    INFLUENZA VACCINE  10/01/2022    TDAP/TD VACCINES (3 - Td or Tdap) 05/30/2023    ANNUAL WELLNESS VISIT  08/15/2023    DXA SCAN  10/29/2023    COLORECTAL CANCER SCREENING  01/11/2027    HEPATITIS C SCREENING  Completed    COVID-19 Vaccine  Completed    MAMMOGRAM  Discontinued       Orders Placed This Encounter   Procedures    MRI Hip Right Without Contrast     Standing Status:   Future     Standing Expiration Date:   8/15/2023     Order Specific Question:   Release to patient     Answer:   Immediate    Pneumococcal Conjugate Vaccine 20-Valent All    CBC (No Diff)     Standing Status:   Future     Number of Occurrences:   1     Standing Expiration Date:   8/15/2023     Order Specific Question:   Release to patient     Answer:   Routine Release    Comprehensive Metabolic Panel     Standing Status:   Future     Number of Occurrences:   1     Standing Expiration Date:   8/15/2023     Order Specific Question:   Release to patient     Answer:   Routine Release    TSH Rfx On Abnormal To Free T4     Standing Status:   Future     Number of Occurrences:   1     Standing Expiration Date:   8/15/2023     Order Specific Question:   Release to patient     Answer:   Routine Release    Lipid Panel     Standing Status:   Future     Number of Occurrences:   1     Standing Expiration Date:   8/15/2023    Vitamin D 25 Hydroxy     Standing Status:   Future     Number of Occurrences:   1     Standing Expiration Date:   8/15/2023     Order Specific Question:   Release to patient     Answer:   Immediate    Vitamin B12     Standing Status:    Future     Number of Occurrences:   1     Standing Expiration Date:   8/15/2023     Order Specific Question:   Release to patient     Answer:   Immediate    Urinalysis With Culture If Indicated -     Standing Status:   Future     Number of Occurrences:   1     Standing Expiration Date:   8/15/2023         Return in about 6 months (around 2/15/2023) for Office visit HTN AND , MWV and fasting labs 1 year.      Nutrition  Meet your nutrient needs primarily through nutrition by consuming foods and not just supplements  The Mediterranean diet and DASH (Dietary Approaches to Stop Hypertension) diet are proven diets to become healthier and lowering the risk of high blood pressure, some kinds of cancer, stroke, heart disease, heart failure, kidney stones, and diabetes. They are also effective at weight loss.  Macronutrient targets % total calories : Carbohydrates 50% (45-65%),  fat 30% (20-35%),  protein 20% (10-35%).   Emphasize vegetables, fruits, whole grains, nuts and seeds, beans and legumes, olive oil, and drink water. Small amounts of dairy, fish and seafood, and lean meat such as poultry. Occasional beef, pork, lamb, sweets and processed foods such as baked goods, chips, crackers, chocolate and candy.   Frozen vegetables and fruit are minimally processed , picked at its peak, convenient, and helps reduce food waste  Try low-sodium canned tomatoes, beans, and vegetables. You can also rinse in water to help remove some sodium.   Canned fruits packed in fruit juice is a better option than heavy syrup.   Recommend whole fruits over juice. Dried fruits are ok but have a higher sugar content.   Eat the rainbow. Vary your veggies with dark green, red and orange colors.   Make half your grains whole grains. Oatmeal brown rice, quinoa, farro, whole-grain pasta, whole-grain bread, and whole-grain tortillas.   Include a variety of protein sources such as lean meats, poultry, fish, seafood, beans, peas, nuts, seeds, eggs, soy    Move to low-fat or fat-free milk or yogurt. Limit cheese.   Other products sold as “milks” made from plants, such as rice, almond, coconut, and hemp “milks,” may contain calcium, but are not included in the dairy group because their overall nutrient content is not similar to dairy milk and fortified soy beverages   Use oils like canola, olive, and others instead of solid fats (like butter and stick margarine, shortening, lard, and coconut oil)  Try grilling, broiling, roasting, or baking--they don't add extra fat  Added sugars include syrups and other sweeteners (brown sugar, corn sweetener, corn syrup, dextrose, fructose, glucose, high fructose corn syrup, honey, invert sugar, lactose, malt syrup, maltose, molasses, raw sugar, sucrose, trehalose, and turbinado sugar). When sugars are added to foods to jennifer them, they add calories without contributing essential nutrients  Cut calories by drinking water or unsweetened beverages. Soda, energy drinks, and sports drinks are a major source of added sugars  Water intake of 1.5L - 2L (50-70 ounces) per day  Daily fiber intake 20 g to 35 g per day  Soluble fiber pulls in water to slow solidify and slow loose, watery stools plus lower cholesterol. Examples are oats, peas, beans, apples, citrus fruits, carrots, barley and psyllium   Insoluble fiber is “roughage” to add bulk, make stool easier to pass and helps constipation. Examples are whole-wheat flour, wheat bran, nuts, beans and vegetables, such as cauliflower, green beans and potatoes.

## 2022-08-15 NOTE — PROGRESS NOTES
"  The ABCs of the Annual Wellness Visit  Subsequent Medicare Wellness Visit    Chief Complaint   Patient presents with   • Medicare Wellness-subsequent   • Abdominal Pain     Off and on for several months.    • Annual Exam      Subjective    History of Present Illness:  Etelvina Prescott is a 67 y.o. female who presents for a Subsequent Medicare Wellness Visit.    Pain in RLQ. Onset during COVID with stomach issues. She always has constipation. She had nausea and cramping. She did pelvic floor PT. Had colonoscopy. She has had ovarian cancer screening. Pain has been bad past couple days in the morning. She feels it while moving. She has had 2-3 steroid shots in right hip.     She has urinary frequency. No urgency or dysuria.     The following portions of the patient's history were reviewed and   updated as appropriate: allergies, current medications, past family history, past medical history, past social history, past surgical history and problem list.    Compared to one year ago, the patient feels her physical   health is better.    Compared to one year ago, the patient feels her mental   health is the same. Stress with 's health as his caretaker. She doesn't want to \"get up and go\" as she used to. She has looked into counseling. She goes to [facility] for balance and strength exercises.     Recent Hospitalizations:  She was not admitted to the hospital during the last year.       Current Medical Providers:  Patient Care Team:  Celina Campos MD as PCP - General (Family Medicine)  Rikki Davis MD as Consulting Physician (Urology)  Dao Stover MD as Consulting Physician (Orthopedic Surgery)    Outpatient Medications Prior to Visit   Medication Sig Dispense Refill   • betamethasone valerate (VALISONE) 0.1 % cream betamethasone valerate 0.1 % topical cream   Apply to bilateral ear canals gently with a qtip 1-2 x weekly     • Calcium Carbonate 1500 (600 Ca) MG tablet Calcium 600-D3 " Plus (mag-zinc)   daily     • cycloSPORINE (RESTASIS) 0.05 % ophthalmic emulsion Apply 1 drop to eye Every 12 (Twelve) Hours.     • diclofenac (VOLTAREN) 1 % gel gel Apply 4 g topically to the appropriate area as directed 4 (Four) Times a Day As Needed (hip pain). 100 g 1   • dicyclomine (BENTYL) 20 MG tablet Take 1 tablet by mouth Every 6 (Six) Hours. (Patient taking differently: Take 20 mg by mouth As Needed.) 120 tablet 11   • enalapril (VASOTEC) 20 MG tablet TAKE 1/2 TABLET TWICE DAILY 90 tablet 1   • fexofenadine (ALLEGRA) 180 MG tablet Take 1 tablet by mouth Daily As Needed.     • hydrOXYzine (ATARAX) 25 MG tablet Take 1-2 tablets by mouth Every 6 (Six) Hours As Needed for Anxiety. 60 tablet 3   • ibuprofen (ADVIL,MOTRIN) 400 MG tablet Take 1 tablet by mouth 2 (Two) Times a Day As Needed for Mild Pain . 180 tablet 1   • Multiple Vitamin (MULTIVITAMIN) capsule Take  by mouth daily.     • polyvinyl alcohol-povidone (HYPOTEARS) 1.4-0.6 % ophthalmic solution Apply 1 drop to eye 2 (two) times a day.     • simethicone (MYLICON) 125 MG chewable tablet Chew 125 mg Every 6 (Six) Hours As Needed for Flatulence.     • sertraline (ZOLOFT) 100 MG tablet TAKE ONE TABLET BY MOUTH DAILY 90 tablet 1   • traZODone (DESYREL) 100 MG tablet Take 1 tablet by mouth every night at bedtime. 90 tablet 3     No facility-administered medications prior to visit.       No opioid medication identified on active medication list. I have reviewed chart for other potential  high risk medication/s and harmful drug interactions in the elderly.          Aspirin is not on active medication list.  Aspirin use is not indicated based on review of current medical condition/s. Risk of harm outweighs potential benefits.  .    Patient Active Problem List   Diagnosis   • Atopic rhinitis   • Osteoarthritis of cervical spine   • Tear film insufficiency   • Headache   • Essential hypertension   • Irritable bowel syndrome   • Sarcoidosis   • Cobalamin  "deficiency   • Vitamin D deficiency   • Mitral regurgitation   • Low back pain   • Left hip pain   • Chronic fatigue   • Lumbar spondylosis   • Atypical glandular cells of undetermined significance (PRITESH) on cervical Pap smear   • Endometrial hyperplasia   • Greater trochanteric bursitis of left hip   • Gluteal tendinitis of left buttock   • Acquired stenosis of external ear canal   • Primary malignant neoplasm of lower outer quadrant of female breast (HCC)   • H/O malignant neoplasm of breast   • Situational anxiety   • Major depressive disorder, recurrent, in full remission (HCC)   • Primary insomnia   • Chronic idiopathic constipation   • Primary osteoarthritis of both hips     Advance Care Planning  Advance Directive is not on file.  ACP discussion was held with the patient during this visit. Patient has an advance directive (not in EMR), copy requested.          Objective    Vitals:    08/15/22 0933   BP: 130/72   Pulse: 88   SpO2: 98%   Weight: 58.5 kg (129 lb)   Height: 162.6 cm (64\")   PainSc:   4   PainLoc: Abdomen     Estimated body mass index is 22.14 kg/m² as calculated from the following:    Height as of this encounter: 162.6 cm (64\").    Weight as of this encounter: 58.5 kg (129 lb).    BMI is within normal parameters. No other follow-up for BMI required.      Does the patient have evidence of cognitive impairment? No    Physical Exam  Constitutional:       General: She is not in acute distress.  HENT:      Right Ear: Tympanic membrane and ear canal normal.      Left Ear: Tympanic membrane and ear canal normal.   Eyes:      General:         Right eye: No discharge.         Left eye: No discharge.      Conjunctiva/sclera: Conjunctivae normal.   Neck:      Thyroid: No thyromegaly.   Cardiovascular:      Rate and Rhythm: Normal rate and regular rhythm.   Pulmonary:      Effort: Pulmonary effort is normal.      Breath sounds: Normal breath sounds.   Abdominal:      Palpations: Abdomen is soft. There is no " hepatomegaly.      Tenderness: There is no abdominal tenderness.      Comments: Right groin without palpable hernia   Musculoskeletal:      Cervical back: Neck supple.      Lumbar back: Negative right straight leg raise test.      Right hip: No bony tenderness. Normal range of motion.      Comments: Negative Eugenia   Lymphadenopathy:      Head:      Right side of head: No submandibular, preauricular or posterior auricular adenopathy.      Left side of head: No submandibular, preauricular or posterior auricular adenopathy.      Cervical: No cervical adenopathy.   Skin:     General: Skin is warm.   Neurological:      Mental Status: She is alert and oriented to person, place, and time.   Psychiatric:         Mood and Affect: Mood normal.         Behavior: Behavior normal.         Thought Content: Thought content normal.         Judgment: Judgment normal.                 HEALTH RISK ASSESSMENT    Smoking Status:  Social History     Tobacco Use   Smoking Status Never Smoker   Smokeless Tobacco Never Used     Alcohol Consumption:  Social History     Substance and Sexual Activity   Alcohol Use Yes   • Alcohol/week: 5.0 standard drinks   • Types: 5 Glasses of wine per week     Fall Risk Screen:    [facility] Fall Risk Assessment was completed, and patient is at LOW risk for falls.Assessment completed on:8/15/2022   [facility] Key Questions   Have you fallen in the past year?: No  Do you feel unsteady with walking?: No  Are you worried about falling?: Yes    Stay Idependant Patient Questions   Patient Fall Risk Assessment Score : 0        Depression Screening:  PHQ-2/PHQ-9 Depression Screening 8/15/2022   Retired PHQ-9 Total Score -   Retired Total Score -   Little Interest or Pleasure in Doing Things 0-->not at all   Feeling Down, Depressed or Hopeless 1-->several days   PHQ-9: Brief Depression Severity Measure Score 1       Health Habits and Functional and Cognitive Screening:  Functional & Cognitive Status  8/15/2022   Do you have difficulty preparing food and eating? No   Do you have difficulty bathing yourself, getting dressed or grooming yourself? No   Do you have difficulty using the toilet? No   Do you have difficulty moving around from place to place? No   Do you have trouble with steps or getting out of a bed or a chair? No   Current Diet Well Balanced Diet   Dental Exam Up to date   Eye Exam Up to date   Exercise (times per week) 3 times per week   Current Exercises Include Walking;Stationary Bicycling/Spin Class   Current Exercise Activities Include -   Do you need help using the phone?  No   Are you deaf or do you have serious difficulty hearing?  No   Do you need help with transportation? No   Do you need help shopping? No   Do you need help preparing meals?  No   Do you need help with housework?  No   Do you need help with laundry? No   Do you need help taking your medications? No   Do you need help managing money? No   Do you ever drive or ride in a car without wearing a seat belt? No   Have you felt unusual stress, anger or loneliness in the last month? Yes   Who do you live with? Spouse   If you need help, do you have trouble finding someone available to you? No   Have you been bothered in the last four weeks by sexual problems? No   Do you have difficulty concentrating, remembering or making decisions? Yes       Age-appropriate Screening Schedule:  Refer to the list below for future screening recommendations based on patient's age, sex and/or medical conditions. Orders for these recommended tests are listed in the plan section. The patient has been provided with a written plan.    Health Maintenance   Topic Date Due   • ZOSTER VACCINE (2 of 3) 04/07/2014   • PAP SMEAR  06/28/2021   • INFLUENZA VACCINE  10/01/2022   • TDAP/TD VACCINES (3 - Td or Tdap) 05/30/2023   • DXA SCAN  10/29/2023   • MAMMOGRAM  Discontinued              Immunization History   Administered Date(s) Administered   • COVID-19 (PFIZER)  PURPLE CAP 02/04/2021, 02/26/2021, 10/13/2021   • Covid-19 (Pfizer) Gray Cap 06/07/2022   • Flu Vaccine Quad PF >36MO 10/31/2019, 10/14/2020   • Flu Vaccine Split Quad 10/31/2019   • FluMist 2-49yrs 11/14/2013, 10/01/2016   • Fluad Quad 65+ 10/07/2020   • Flublock Quad =>18yrs 10/28/2019   • Fluzone High Dose =>65 Years ([employer] ONLY) 12/08/2017, 11/17/2021   • Fluzone High-Dose 65+yrs 11/17/2021   • Hepatitis A 01/22/2019, 07/22/2019   • PPD Test 01/01/2011, 08/03/2015, 06/07/2016, 06/07/2016, 09/06/2018   • Pneumococcal Conjugate 20-Valent (PCV20) 08/15/2022   • Pneumococcal Polysaccharide (PPSV23) 05/03/2018   • Td 07/29/2004   • Tdap 05/30/2013   • Zostavax 02/10/2014     XR Hip With or Without Pelvis 2 - 3 View Right (07/13/2021 10:17)    Assessment & Plan   CMS Preventative Services Quick Reference  Risk Factors Identified During Encounter  Chronic Pain   Depression/Dysphoria  Immunizations Discussed/Encouraged (specific Immunizations; Prevnar 20 (Pneumococcal 20-valent conjugate) and Shingrix  Inadequate Social Support, Isolation, Loneliness, Lack of Transportation, Financial Difficulties, or Caregiver Stress   The above risks/problems have been discussed with the patient.  Follow up actions/plans if indicated are seen below in the Assessment/Plan Section.  Pertinent information has been shared with the patient in the After Visit Summary.      Diagnoses and all orders for this visit:    1. Medicare annual wellness visit, subsequent (Primary)  Counseling/anticipatory guidance: Nutrition handout,  immunizations, screenings  Next bone density screening due in 2023.  Colon cancer screening up-to-date.  2. Immunization due  -     Pneumococcal Conjugate Vaccine 20-Valent All  Recommend Shingrix vaccine series at local pharmacy.    3. Essential hypertension  -     Comprehensive Metabolic Panel; Future  -     TSH Rfx On Abnormal To Free T4; Future  -     Lipid Panel; Future  Stable.  Continue enalapril.  Recheck in 6  months.  4. Vitamin D deficiency  -     Vitamin D 25 Hydroxy; Future  Currently on replacement.  5. Cobalamin deficiency  -     Vitamin B12; Future    6. Screening for deficiency anemia  -     CBC (No Diff); Future    7. Urinary frequency  -     Urinalysis With Culture If Indicated -; Future    8. Recurrent major depressive disorder, in partial remission (HCC)  -     sertraline (ZOLOFT) 100 MG tablet; Take 1.5 tablets by mouth Daily.  Dispense: 135 tablet; Refill: 3  Uncontrolled.  Increase Zoloft to 150 mg.  Follow-up in the office if not improving or experiencing side effects.  Also introduced possibility of GeneSight testing.  9. Situational anxiety  -     sertraline (ZOLOFT) 100 MG tablet; Take 1.5 tablets by mouth Daily.  Dispense: 135 tablet; Refill: 3  Uncontrolled.  Increase Zoloft to 150 mg.  Follow-up in the office if not improving or experiencing side effects.  Also introduced possibility of GeneSight testing.  She has been prescribed hydroxyzine in the past.  Discussed with patient I would not prescribe Xanax.  10. Dyssomnia  -     traZODone (DESYREL) 100 MG tablet; Take 1 tablet by mouth every night at bedtime.  Dispense: 90 tablet; Refill: 3  Continue trazodone.  11. Right inguinal pain  -     MRI Hip Right Without Contrast; Future  She has had gastrointestinal as well as gynecological follow-up for pain with no identifiable cause.  I have concerned if it could be referred pain from right hip.  X-rays had shown arthritis.  Recommend further evaluation of soft tissues with MRI of the right hip.   12. Primary osteoarthritis of both hips  -     MRI Hip Right Without Contrast; Future   I have concerned if it could be referred pain from right hip.  X-rays had shown arthritis.  Recommend further evaluation of soft tissues with MRI of the right hip.             Follow Up:   Return in about 6 months (around 2/15/2023) for Office visit HTN AND , MWV and fasting labs 1 year.     An After Visit Summary and PPPS  were made available to the patient.                   Electronically signed by Celina Villegas MD, 08/15/22, 10:43 AM EDT.

## 2022-08-16 LAB
APPEARANCE UR: CLEAR
BACTERIA #/AREA URNS HPF: NORMAL /HPF
BILIRUB UR QL STRIP: NEGATIVE
CASTS URNS QL MICRO: NORMAL /LPF
COLOR UR: YELLOW
EPI CELLS #/AREA URNS HPF: NORMAL /HPF (ref 0–10)
GLUCOSE UR QL STRIP: NEGATIVE
HGB UR QL STRIP: NEGATIVE
KETONES UR QL STRIP: NEGATIVE
LEUKOCYTE ESTERASE UR QL STRIP: NEGATIVE
MICRO URNS: NORMAL
MICRO URNS: NORMAL
NITRITE UR QL STRIP: NEGATIVE
PH UR STRIP: 5.5 [PH] (ref 5–7.5)
PROT UR QL STRIP: NEGATIVE
RBC #/AREA URNS HPF: NORMAL /HPF (ref 0–2)
SP GR UR STRIP: 1.02 (ref 1–1.03)
URINALYSIS REFLEX: NORMAL
UROBILINOGEN UR STRIP-MCNC: 0.2 MG/DL (ref 0.2–1)
WBC #/AREA URNS HPF: NORMAL /HPF (ref 0–5)

## 2022-08-18 NOTE — PROGRESS NOTES
Physical Therapy Daily Progress Note  Patient: Etelvina Prescott   : 1955  Diagnosis/ICD-10 Code:  Urinary urgency [R39.15]  Referring practitioner: Marcio Bojorquez DO  Date of Initial Visit: Type: THERAPY  Noted: 2021  Today's Date: 2021  Patient seen for 5 sessions      Subjective   Etelvina Prescott reports trying to do exercises. Still having some pain off and on in lower pelvic area. Not as much as it was during day. Feels so much better. Mostly feeling it at night.   Pain Rating (0-10): 0      Objective   verbal consent obtained for internal pelvic exam/treatment with declined need for second person in room     See Exercise, Manual, and Modality Logs for complete treatment.     Assessment/Plan  Pt tolerated manual therapy with mild discomfort. She has decreased pain overall. Pt compliant with HEP and is to progress strengthening to seated at this time. Will continue manual and progress as indicated to decrease pelvic pain.     Progress per Plan of Care         1130/1215   Timed:         Manual Therapy:    37     mins  36037;     Therapeutic Exercise:    8     mins  18657;     Neuromuscular Ja:    0    mins  53886;    Therapeutic Activity:     0     mins  93427;     Gait Trainin     mins  55888;     Ultrasound:     0     mins  74067;    Ionto                               0    mins   90688  Self Care                       0     mins   93381  Canalith Repos               0    mins  35859    Un-Timed:  Electrical Stimulation:    0     mins  75276 ( );  Dry Needling     0     mins self-pay  Traction     0     mins 59895  Low Eval     0     Mins  67732  Mod Eval     0     Mins  41357  High Eval                       0     Mins  37939  Re-Eval                           0    mins  49483    Timed Treatment:   45   mins   Total Treatment:     45   mins    Kristian Jaquez PT  [place] License # [id number]  Physical Therapist        2

## 2022-10-10 DIAGNOSIS — F41.8 SITUATIONAL ANXIETY: ICD-10-CM

## 2022-10-10 DIAGNOSIS — F33.41 RECURRENT MAJOR DEPRESSIVE DISORDER, IN PARTIAL REMISSION: ICD-10-CM

## 2022-10-10 RX ORDER — SERTRALINE HYDROCHLORIDE 100 MG/1
150 TABLET, FILM COATED ORAL DAILY
Qty: 135 TABLET | Refills: 3 | Status: SHIPPED | OUTPATIENT
Start: 2022-10-10 | End: 2023-01-24 | Stop reason: SDUPTHER

## 2022-10-10 NOTE — TELEPHONE ENCOUNTER
Rx Refill Note  Requested Prescriptions     Pending Prescriptions Disp Refills   • sertraline (ZOLOFT) 100 MG tablet 135 tablet 3     Sig: Take 1.5 tablets by mouth Daily.      Last office visit with prescribing clinician: 8/15/2022      Next office visit with prescribing clinician: 2/15/2023            Reshma Miller MA  10/10/22, 11:27 EDT

## 2022-10-10 NOTE — TELEPHONE ENCOUNTER
Caller: Etelvina Prescott    Relationship: Self    Best call back number:      [phone]     Requested Prescriptions:   Requested Prescriptions     Pending Prescriptions Disp Refills   • sertraline (ZOLOFT) 100 MG tablet 135 tablet 3     Sig: Take 1.5 tablets by mouth Daily.      Pharmacy where request should be sent: [facility] [id number] [address] [place]  - [phone]  - [phone]     Additional details provided by patient:     PATIENT STATED SHE WOULD LIKE TO KEEP THE MEDICATION AT (100) MG    Does the patient have less than a 3 day supply:  [] Yes  [x] No    Palma Muse Rep   10/10/22 11:10 EDT     DR MITTAL

## 2022-10-24 DIAGNOSIS — F33.41 RECURRENT MAJOR DEPRESSIVE DISORDER, IN PARTIAL REMISSION: ICD-10-CM

## 2022-10-24 DIAGNOSIS — F41.8 SITUATIONAL ANXIETY: ICD-10-CM

## 2022-10-24 RX ORDER — SERTRALINE HYDROCHLORIDE 100 MG/1
TABLET, FILM COATED ORAL
Qty: 90 TABLET | OUTPATIENT
Start: 2022-10-24

## 2022-12-16 PROCEDURE — 87086 URINE CULTURE/COLONY COUNT: CPT | Performed by: FAMILY MEDICINE

## 2023-01-24 DIAGNOSIS — F33.41 RECURRENT MAJOR DEPRESSIVE DISORDER, IN PARTIAL REMISSION: ICD-10-CM

## 2023-01-24 DIAGNOSIS — F41.8 SITUATIONAL ANXIETY: ICD-10-CM

## 2023-01-24 RX ORDER — SERTRALINE HYDROCHLORIDE 100 MG/1
150 TABLET, FILM COATED ORAL DAILY
Qty: 135 TABLET | Refills: 3 | Status: SHIPPED | OUTPATIENT
Start: 2023-01-24 | End: 2023-02-22

## 2023-01-24 NOTE — TELEPHONE ENCOUNTER
Rx Refill Note  Requested Prescriptions     Pending Prescriptions Disp Refills   • sertraline (ZOLOFT) 100 MG tablet 135 tablet 3     Sig: Take 1.5 tablets by mouth Daily.      Last office visit with prescribing clinician: 8/15/2022   Last telemedicine visit with prescribing clinician: 2/15/2023   Next office visit with prescribing clinician: 2/15/2023                         Would you like a call back once the refill request has been completed: [] Yes [] No    If the office needs to give you a call back, can they leave a voicemail: [] Yes [] No    Magdalena Stafford MA  01/24/23, 12:57 EST

## 2023-02-22 ENCOUNTER — OFFICE VISIT (OUTPATIENT)
Dept: FAMILY MEDICINE CLINIC | Facility: CLINIC | Age: 68
End: 2023-02-22
Payer: MEDICARE

## 2023-02-22 VITALS
BODY MASS INDEX: 23.04 KG/M2 | HEIGHT: 63 IN | HEART RATE: 94 BPM | DIASTOLIC BLOOD PRESSURE: 64 MMHG | OXYGEN SATURATION: 97 % | WEIGHT: 130 LBS | SYSTOLIC BLOOD PRESSURE: 128 MMHG

## 2023-02-22 DIAGNOSIS — Z23 NEED FOR VACCINATION: ICD-10-CM

## 2023-02-22 DIAGNOSIS — I10 ESSENTIAL HYPERTENSION: Primary | Chronic | ICD-10-CM

## 2023-02-22 DIAGNOSIS — E78.00 PURE HYPERCHOLESTEROLEMIA: ICD-10-CM

## 2023-02-22 PROCEDURE — 99214 OFFICE O/P EST MOD 30 MIN: CPT | Performed by: FAMILY MEDICINE

## 2023-02-22 RX ORDER — SERTRALINE HYDROCHLORIDE 100 MG/1
100 TABLET, FILM COATED ORAL DAILY
COMMUNITY

## 2023-02-22 RX ORDER — ENALAPRIL MALEATE 20 MG/1
10 TABLET ORAL 2 TIMES DAILY
Qty: 90 TABLET | Refills: 1 | Status: SHIPPED | OUTPATIENT
Start: 2023-02-22

## 2023-02-22 NOTE — PROGRESS NOTES
"Chief Complaint  Hypertension    Subjective        Etelvina Prescott presents to [facility]  Hypertension  This is a chronic problem. The problem is controlled. Current antihypertension treatment includes ACE inhibitors.   Answers for HPI/ROS submitted by the patient on 2/21/2023  Please describe your symptoms.: 6 month Medicare visit. No new symptoms except tiredness.  Have you had these symptoms before?: Yes  How long have you been having these symptoms?: Greater than 2 weeks  Please list any medications you are currently taking for this condition.: None except vitamins.  What is the primary reason for your visit?: Other    She decided to continue zoloft 100mg.     Occasional home blood pressure monitoring. She had lightheadedness.     She felt tired after [place]. She exercises at [employer]. Walked 4 miles yesterday. She can't nap during the day. She stays up later and sleeps later.  She takes a multivitamin.    She had mild Shingles in the past.     She is concerned about her cholesterol levels.    Objective   Vital Signs:  /64   Pulse 94   Ht 160 cm (62.99\")   Wt 59 kg (130 lb)   SpO2 97%   BMI 23.03 kg/m²   Estimated body mass index is 23.03 kg/m² as calculated from the following:    Height as of this encounter: 160 cm (62.99\").    Weight as of this encounter: 59 kg (130 lb).       BMI is within normal parameters. No other follow-up for BMI required.      Physical Exam  Vitals reviewed.   Constitutional:       General: She is not in acute distress.     Appearance: She is not ill-appearing.   Cardiovascular:      Rate and Rhythm: Normal rate and regular rhythm.   Pulmonary:      Effort: Pulmonary effort is normal.      Breath sounds: Normal breath sounds.   Neurological:      Mental Status: She is alert.   Psychiatric:         Mood and Affect: Mood normal.        Result Review :  The following data was reviewed by: Celina Ferrera MD on 02/22/2023:  Lipid " Panel    Lipid Panel 8/15/22   Total Cholesterol 207 (A)   Triglycerides 88   HDL Cholesterol 81 (A)   VLDL Cholesterol 15   LDL Cholesterol  111 (A)   (A) Abnormal value       Comments are available for some flowsheets but are not being displayed.                        Assessment and Plan   Diagnoses and all orders for this visit:    1. Essential hypertension (Primary)  -     enalapril (VASOTEC) 20 MG tablet; Take 0.5 tablets by mouth 2 (Two) Times a Day.  Dispense: 90 tablet; Refill: 1  Able.  Discuss changing enalapril to once nightly dosing.  If she experiences lightheadedness can return to half pill twice a day dosing.  Recheck in 6 months.  2. Pure hypercholesterolemia  Reviewed cholesterol panel with patient.  Discussed limiting red meat saturated fat and fried foods.  Increasing produce and healthy fats like olive oil.  No indication for medication at this time.  Plan to check her labs again at her physical this summer.  3. Need for vaccination  Recommend Shingrix vaccine.  She previously had Zostavax.  Handout also provided.  Recommend COVID bivalent booster.           Follow Up   Return in about 25 weeks (around 8/16/2023) for MWV and fasting labs.  Patient was given instructions and counseling regarding her condition or for health maintenance advice. Please see specific information pulled into the AVS if appropriate.     Electronically signed by Celina Ferrera MD, 02/22/23, 10:43 AM EST.

## 2023-06-08 DIAGNOSIS — G47.9 DYSSOMNIA: ICD-10-CM

## 2023-06-08 RX ORDER — TRAZODONE HYDROCHLORIDE 100 MG/1
100 TABLET ORAL
Qty: 90 TABLET | Refills: 0 | Status: SHIPPED | OUTPATIENT
Start: 2023-06-08

## 2023-08-20 DIAGNOSIS — I10 ESSENTIAL HYPERTENSION: Chronic | ICD-10-CM

## 2023-08-21 RX ORDER — ENALAPRIL MALEATE 20 MG/1
TABLET ORAL
Qty: 90 TABLET | Refills: 1 | Status: SHIPPED | OUTPATIENT
Start: 2023-08-21

## 2023-08-21 NOTE — TELEPHONE ENCOUNTER
Rx Refill Note  Requested Prescriptions     Pending Prescriptions Disp Refills    enalapril (VASOTEC) 20 MG tablet [Pharmacy Med Name: ENALAPRIL MALEATE 20 MG Tablet] 90 tablet 1     Sig: TAKE 1/2 TABLET TWICE DAILY      Last office visit with prescribing clinician: 2/22/2023   Last telemedicine visit with prescribing clinician: Visit date not found   Next office visit with prescribing clinician: 8/28/2023                         Would you like a call back once the refill request has been completed: [] Yes [] No    If the office needs to give you a call back, can they leave a voicemail: [] Yes [] No    Sienna Buckley MA  08/21/23, 08:14 EDT

## 2023-08-28 ENCOUNTER — OFFICE VISIT (OUTPATIENT)
Age: 68
End: 2023-08-28
Payer: MEDICARE

## 2023-08-28 ENCOUNTER — LAB (OUTPATIENT)
Age: 68
End: 2023-08-28
Payer: MEDICARE

## 2023-08-28 VITALS
HEART RATE: 83 BPM | OXYGEN SATURATION: 98 % | TEMPERATURE: 98.3 F | DIASTOLIC BLOOD PRESSURE: 62 MMHG | BODY MASS INDEX: 23.09 KG/M2 | HEIGHT: 63 IN | SYSTOLIC BLOOD PRESSURE: 133 MMHG | WEIGHT: 130.3 LBS

## 2023-08-28 DIAGNOSIS — M85.88 OSTEOPENIA OF LUMBAR SPINE: ICD-10-CM

## 2023-08-28 DIAGNOSIS — E53.8 COBALAMIN DEFICIENCY: Chronic | ICD-10-CM

## 2023-08-28 DIAGNOSIS — E55.9 VITAMIN D DEFICIENCY: Chronic | ICD-10-CM

## 2023-08-28 DIAGNOSIS — E78.00 PURE HYPERCHOLESTEROLEMIA: ICD-10-CM

## 2023-08-28 DIAGNOSIS — Z00.00 MEDICARE ANNUAL WELLNESS VISIT, SUBSEQUENT: Primary | ICD-10-CM

## 2023-08-28 DIAGNOSIS — I10 ESSENTIAL HYPERTENSION: Chronic | ICD-10-CM

## 2023-08-28 DIAGNOSIS — F51.01 PRIMARY INSOMNIA: ICD-10-CM

## 2023-08-28 PROCEDURE — 1159F MED LIST DOCD IN RCRD: CPT | Performed by: FAMILY MEDICINE

## 2023-08-28 PROCEDURE — 3075F SYST BP GE 130 - 139MM HG: CPT | Performed by: FAMILY MEDICINE

## 2023-08-28 PROCEDURE — 36415 COLL VENOUS BLD VENIPUNCTURE: CPT | Performed by: FAMILY MEDICINE

## 2023-08-28 PROCEDURE — 3078F DIAST BP <80 MM HG: CPT | Performed by: FAMILY MEDICINE

## 2023-08-28 PROCEDURE — 99397 PER PM REEVAL EST PAT 65+ YR: CPT | Performed by: FAMILY MEDICINE

## 2023-08-28 PROCEDURE — 96160 PT-FOCUSED HLTH RISK ASSMT: CPT | Performed by: FAMILY MEDICINE

## 2023-08-28 PROCEDURE — 1170F FXNL STATUS ASSESSED: CPT | Performed by: FAMILY MEDICINE

## 2023-08-28 PROCEDURE — G0439 PPPS, SUBSEQ VISIT: HCPCS | Performed by: FAMILY MEDICINE

## 2023-08-28 PROCEDURE — 1160F RVW MEDS BY RX/DR IN RCRD: CPT | Performed by: FAMILY MEDICINE

## 2023-08-28 RX ORDER — MULTIVIT WITH MINERALS/LUTEIN
250 TABLET ORAL DAILY
COMMUNITY

## 2023-08-28 RX ORDER — TRAZODONE HYDROCHLORIDE 50 MG/1
50 TABLET ORAL
Qty: 90 TABLET | Refills: 3 | Status: SHIPPED | OUTPATIENT
Start: 2023-08-28

## 2023-08-28 NOTE — PROGRESS NOTES
The ABCs of the Annual Wellness Visit  Subsequent Medicare Wellness Visit    Subjective    Etelvina Prescott is a 68 y.o. female who presents for a Subsequent Medicare Wellness Visit.    The following portions of the patient's history were reviewed and   updated as appropriate: allergies, current medications, past family history, past medical history, past social history, past surgical history, and problem list.    Compared to one year ago, the patient feels her physical   health is better.    Compared to one year ago, the patient feels her mental   health is the same.    Recent Hospitalizations:  She was not admitted to the hospital during the last year.       Current Medical Providers:  Patient Care Team:  Celina Ferrera MD as PCP - General (Family Medicine)  Rikki Davis MD as Consulting Physician (Urology)  Dao Stover MD as Consulting Physician (Orthopedic Surgery)  Cecy Harrison MD as Consulting Physician (Obstetrics and Gynecology)    Outpatient Medications Prior to Visit   Medication Sig Dispense Refill    ALPRAZolam (XANAX) 0.5 MG tablet       betamethasone valerate (VALISONE) 0.1 % cream betamethasone valerate 0.1 % topical cream   Apply to bilateral ear canals gently with a qtip 1-2 x weekly      Calcium Carbonate 1500 (600 Ca) MG tablet Calcium 600-D3 Plus (mag-zinc)   daily      cycloSPORINE (RESTASIS) 0.05 % ophthalmic emulsion Apply 1 drop to eye(s) as directed by provider Every 12 (Twelve) Hours.      diclofenac (VOLTAREN) 1 % gel gel Apply 4 g topically to the appropriate area as directed 4 (Four) Times a Day As Needed (hip pain). 100 g 1    enalapril (VASOTEC) 20 MG tablet TAKE 1/2 TABLET TWICE DAILY 90 tablet 1    fexofenadine (ALLEGRA) 180 MG tablet Take 1 tablet by mouth Daily As Needed.      hydrOXYzine (ATARAX) 25 MG tablet Take 1-2 tablets by mouth Every 6 (Six) Hours As Needed for Anxiety. 60 tablet 3    ibuprofen (ADVIL,MOTRIN) 400 MG tablet Take 1 tablet by  mouth 2 (Two) Times a Day As Needed for Mild Pain . 180 tablet 1    Multiple Vitamin (MULTIVITAMIN) capsule Take  by mouth daily.      polyvinyl alcohol-povidone (HYPOTEARS) 1.4-0.6 % ophthalmic solution Apply 1 drop to eye(s) as directed by provider 2 (Two) Times a Day.      sertraline (ZOLOFT) 100 MG tablet Take 1 tablet by mouth Daily.      traZODone (DESYREL) 100 MG tablet TAKE 1 TABLET BY MOUTH EVERY NIGHT AT BEDTIME. 90 tablet 0    vitamin C (ASCORBIC ACID) 250 MG tablet Take 1 tablet by mouth Daily.      dicyclomine (BENTYL) 20 MG tablet Take 1 tablet by mouth Every 6 (Six) Hours. (Patient taking differently: Take 1 tablet by mouth As Needed.) 120 tablet 11    triamcinolone (KENALOG) 0.1 % ointment  (Patient not taking: Reported on 8/28/2023)       No facility-administered medications prior to visit.       No opioid medication identified on active medication list. I have reviewed chart for other potential  high risk medication/s and harmful drug interactions in the elderly.        Aspirin is not on active medication list.  Aspirin use is not indicated based on review of current medical condition/s. Risk of harm outweighs potential benefits.  .    Patient Active Problem List   Diagnosis    Atopic rhinitis    Osteoarthritis of cervical spine    Tear film insufficiency    Headache    Essential hypertension    Irritable bowel syndrome    Sarcoidosis    Cobalamin deficiency    Vitamin D deficiency    Mitral regurgitation    Low back pain    Left hip pain    Chronic fatigue    Lumbar spondylosis    Atypical glandular cells of undetermined significance (PRITESH) on cervical Pap smear    Endometrial hyperplasia    Greater trochanteric bursitis of left hip    Gluteal tendinitis of left buttock    Acquired stenosis of external ear canal    Primary malignant neoplasm of lower outer quadrant of female breast    H/O malignant neoplasm of breast    Situational anxiety    Major depressive disorder, recurrent, in full remission  "   Primary insomnia    Chronic idiopathic constipation    Primary osteoarthritis of both hips    Pure hypercholesterolemia     Advance Care Planning   Advance Care Planning     Advance Directive is not on file.  ACP discussion was held with the patient during this visit. Patient has an advance directive (not in EMR), copy requested.     Objective    Vitals:    23 0905   BP: 133/62   Pulse: 83   Temp: 98.3 øF (36.8 øC)   SpO2: 98%   Weight: 59.1 kg (130 lb 4.8 oz)   Height: 159.7 cm (62.87\")     Estimated body mass index is 23.17 kg/mý as calculated from the following:    Height as of this encounter: 159.7 cm (62.87\").    Weight as of this encounter: 59.1 kg (130 lb 4.8 oz).    BMI is within normal parameters. No other follow-up for BMI required.      Does the patient have evidence of cognitive impairment? No          HEALTH RISK ASSESSMENT    Smoking Status:  Social History     Tobacco Use   Smoking Status Never   Smokeless Tobacco Never     Alcohol Consumption:  Social History     Substance and Sexual Activity   Alcohol Use Yes    Alcohol/week: 5.0 standard drinks    Types: 5 Glasses of wine per week     Fall Risk Screen:    STEADI Fall Risk Assessment was completed, and patient is at LOW risk for falls.Assessment completed on:2023    Depression Screenin/28/2023     9:05 AM   PHQ-2/PHQ-9 Depression Screening   Little Interest or Pleasure in Doing Things 0-->not at all   Feeling Down, Depressed or Hopeless 0-->not at all   PHQ-9: Brief Depression Severity Measure Score 0       Health Habits and Functional and Cognitive Screenin/28/2023     9:02 AM   Functional & Cognitive Status   Do you have difficulty preparing food and eating? No   Do you have difficulty bathing yourself, getting dressed or grooming yourself? No   Do you have difficulty using the toilet? No   Do you have difficulty moving around from place to place? No   Do you have trouble with steps or getting out of a bed or a " chair? No   Current Diet Well Balanced Diet   Dental Exam Up to date   Eye Exam Up to date   Exercise (times per week) 3 times per week   Current Exercises Include Walking   Do you need help using the phone?  No   Are you deaf or do you have serious difficulty hearing?  No   Do you need help to go to places out of walking distance? No   Do you need help shopping? No   Do you need help preparing meals?  No   Do you need help with housework?  No   Do you need help with laundry? No   Do you need help taking your medications? No   Do you need help managing money? No   Do you ever drive or ride in a car without wearing a seat belt? No   Have you felt unusual stress, anger or loneliness in the last month? Yes   Who do you live with? Spouse   If you need help, do you have trouble finding someone available to you? No   Have you been bothered in the last four weeks by sexual problems? No   Do you have difficulty concentrating, remembering or making decisions? Yes       Age-appropriate Screening Schedule:  Refer to the list below for future screening recommendations based on patient's age, sex and/or medical conditions. Orders for these recommended tests are listed in the plan section. The patient has been provided with a written plan.    Health Maintenance   Topic Date Due    PAP SMEAR  06/28/2021    COVID-19 Vaccine (5 - Pfizer series) 08/02/2022    TDAP/TD VACCINES (3 - Td or Tdap) 05/30/2023    LIPID PANEL  08/15/2023    INFLUENZA VACCINE  10/01/2023    DXA SCAN  10/29/2023    ANNUAL WELLNESS VISIT  08/28/2024    COLORECTAL CANCER SCREENING  01/11/2027    HEPATITIS C SCREENING  Completed    Pneumococcal Vaccine 65+  Completed    ZOSTER VACCINE  Completed                Immunization History   Administered Date(s) Administered    COVID-19 (PFIZER) Purple Cap Monovalent 02/04/2021, 02/26/2021, 10/13/2021    Covid-19 (Pfizer) Gray Cap Monovalent 06/07/2022    Flu Vaccine Quad PF >36MO 10/31/2019, 10/14/2020    Flu Vaccine  Split Quad 10/31/2019    FluMist 2-49yrs 11/14/2013, 10/01/2016    Fluad Quad 65+ 10/07/2020, 11/29/2022    Flublock Quad =>18yrs 10/28/2019    Fluzone High Dose =>65 Years ([facility] ONLY) 12/08/2017, 11/17/2021    Fluzone High-Dose 65+yrs 11/17/2021    Hepatitis A 01/22/2019, 07/22/2019    PPD Test 01/01/2011, 08/03/2015, 06/07/2016, 06/07/2016, 09/06/2018    Pneumococcal Conjugate 20-Valent (PCV20) 08/15/2022    Pneumococcal Polysaccharide (PPSV23) 05/03/2018    Shingrix 03/20/2023, 06/21/2023    Td (TDVAX) 07/29/2004    Tdap 05/30/2013    Zostavax 02/10/2014         CMS Preventative Services Quick Reference  Risk Factors Identified During Encounter  Immunizations Discussed/Encouraged: Tdap, Influenza, COVID19, and RSV  The above risks/problems have been discussed with the patient.  Pertinent information has been shared with the patient in the After Visit Summary.  An After Visit Summary and PPPS were made available to the patient.    Follow Up:   Next Medicare Wellness visit to be scheduled in 1 year.       Additional E&M Note during same encounter follows:  Patient has multiple medical problems which are significant and separately identifiable that require additional work above and beyond the Medicare Wellness Visit.      Chief Complaint  Medicare Wellness-subsequent and Annual Exam    Subjective        HPI  Etelvina Prescott is also being seen today for physical, hypertension, hyperlipidemia, insomnia    She cooks less and eats out more take out. She has well balanced meals, less processed, increased fruits. Increased protein.     She walks 3-4 miles walking. She goes to senior center and takes 4 classes.   Better balance. Weight training.     Sleeping 6 hours if anna. Cut trazodone back to 50mg.     Increased stressful life.     She feels more tired. Not napping but she wants to lay down for 20  minutes.             Objective   Vital Signs:  /62   Pulse 83   Temp 98.3 øF (36.8 øC)   Ht 159.7 cm  "(62.87\")   Wt 59.1 kg (130 lb 4.8 oz)   SpO2 98%   BMI 23.17 kg/mý     Physical Exam  Constitutional:       General: She is not in acute distress.  HENT:      Right Ear: Tympanic membrane and ear canal normal.      Left Ear: Tympanic membrane and ear canal normal.      Nose: No congestion or rhinorrhea.      Mouth/Throat:      Mouth: Mucous membranes are moist.      Pharynx: No oropharyngeal exudate or posterior oropharyngeal erythema.   Eyes:      General:         Right eye: No discharge.         Left eye: No discharge.      Conjunctiva/sclera: Conjunctivae normal.   Neck:      Thyroid: No thyromegaly.   Cardiovascular:      Rate and Rhythm: Normal rate and regular rhythm.   Pulmonary:      Effort: Pulmonary effort is normal.      Breath sounds: Normal breath sounds.   Abdominal:      Palpations: Abdomen is soft. There is no hepatomegaly.      Tenderness: There is no abdominal tenderness.   Musculoskeletal:      Cervical back: Neck supple.      Right lower leg: No edema.      Left lower leg: No edema.   Lymphadenopathy:      Head:      Right side of head: No submandibular, preauricular or posterior auricular adenopathy.      Left side of head: No submandibular, preauricular or posterior auricular adenopathy.      Cervical: No cervical adenopathy.   Skin:     General: Skin is warm.   Neurological:      Mental Status: She is alert and oriented to person, place, and time.      Gait: Gait normal.   Psychiatric:         Mood and Affect: Mood normal.         Behavior: Behavior normal.         Thought Content: Thought content normal.         Judgment: Judgment normal.                       Assessment and Plan   Diagnoses and all orders for this visit:    1. Medicare annual wellness visit, subsequent (Primary)  Counseling/anticipatory guidance: Nutrition, physical activity, healthy weight, immunizations, screenings    2. Essential hypertension  -     Comprehensive Metabolic Panel; Future  -     Lipid Panel; Future  -    "  TSH Rfx On Abnormal To Free T4; Future  Stable.  Continue enalapril 10 mg.   3. Pure hypercholesterolemia  -     Comprehensive Metabolic Panel; Future  -     Lipid Panel; Future  Check labs today.  Not on statin medication.  4. Cobalamin deficiency  -     CBC (No Diff); Future  -     Vitamin B12; Future  Currently taking multivitamin.  5. Vitamin D deficiency  -     Vitamin D,25-Hydroxy; Future  Currently taking multivitamin along with calcium/vitamin D supplement.  6. Primary insomnia  -     traZODone (DESYREL) 50 MG tablet; Take 1 tablet by mouth every night at bedtime.  Dispense: 90 tablet; Refill: 3  Change trazodone to 50 mg nightly.  7. Osteopenia of lumbar spine  Discussed bone density in 19 with osteopenia of the lumbar spine to repeat at 2 to 3-year intervals.  She prefers to postpone until next year.           Follow Up   Return in about 6 months (around 2/28/2024) for Office visit HTN AND , MWV and fasting labs 1 year.  Patient was given instructions and counseling regarding her condition or for health maintenance advice. Please see specific information pulled into the AVS if appropriate.         Electronically signed by Celina Ferrera MD, 08/28/23, 9:42 AM EDT.

## 2023-08-28 NOTE — PATIENT INSTRUCTIONS
Advance Care Planning and Advance Directives     You make decisions on a daily basis - decisions about where you want to live, your career, your home, your life. Perhaps one of the most important decisions you face is your choice for future medical care. Take time to talk with your family and your healthcare team and start planning today.  Advance Care Planning is a process that can help you:  Understand possible future healthcare decisions in light of your own experiences  Reflect on those decision in light of your goals and values  Discuss your decisions with those closest to you and the healthcare professionals that care for you  Make a plan by creating a document that reflects your wishes    Surrogate Decision Maker  In the event of a medical emergency, which has left you unable to communicate or to make your own decisions, you would need someone to make decisions for you.  It is important to discuss your preferences for medical treatment with this person while you are in good health.     Qualities of a surrogate decision maker:  Willing to take on this role and responsibility  Knows what you want for future medical care  Willing to follow your wishes even if they don't agree with them  Able to make difficult medical decisions under stressful circumstances    Advance Directives  These are legal documents you can create that will guide your healthcare team and decision maker(s) when needed. These documents can be stored in the electronic medical record.    Living Will - a legal document to guide your care if you have a terminal condition or a serious illness and are unable to communicate. States vary by statute in document names/types, but most forms may include one or more of the following:        -  Directions regarding life-prolonging treatments        -  Directions regarding artificially provided nutrition/hydration        -  Choosing a healthcare decision maker        -  Direction regarding organ/tissue  donation    Durable Power of  for Healthcare - this document names an -in-fact to make medical decisions for you, but it may also allow this person to make personal and financial decisions for you. Please seek the advice of an  if you need this type of document.    **Advance Directives are not required and no one may discriminate against you if you do not sign one.    Medical Orders  Many states allow specific forms/orders signed by your physician to record your wishes for medical treatment in your current state of health. This form, signed in personal communication with your physician, addresses resuscitation and other medical interventions that you may or may not want.      For more information or to schedule a time with a [facility] Advance Care Planning Facilitator contact: [facility] or call [phone] and someone will contact you directly.  You are due for adacel Tdap vaccination. (provides protection against tetanus, diptheria and whooping cough) Please  get the immunization at your local pharmacy at your earliest convenience. This immunization will next be due in 10 years. Please click on the link for more information about this vaccine.    [facility] Tdap Vaccine Information      Medicare Wellness  Personal Prevention Plan of Service     Date of Office Visit:    Encounter Provider:  Celina Ferrera MD  Place of Service:  [facility]  Patient Name: Etelvina Prescott  :  1955    As part of the Medicare Wellness portion of your visit today, we are providing you with this personalized preventive plan of services (PPPS). This plan is based upon recommendations of the [place] Preventive Services Task Force (USPSTF) and the Advisory Committee on Immunization Practices (ACIP).    This lists the preventive care services that should be considered, and provides dates of when you are due. Items listed as completed are up-to-date and do not  require any further intervention.    Health Maintenance   Topic Date Due    PAP SMEAR  06/28/2021    COVID-19 Vaccine (5 - Pfizer series) 08/02/2022    TDAP/TD VACCINES (3 - Td or Tdap) 05/30/2023    ANNUAL WELLNESS VISIT  08/15/2023    LIPID PANEL  08/15/2023    INFLUENZA VACCINE  10/01/2023    DXA SCAN  10/29/2023    COLORECTAL CANCER SCREENING  01/11/2027    HEPATITIS C SCREENING  Completed    Pneumococcal Vaccine 65+  Completed    ZOSTER VACCINE  Completed       No orders of the defined types were placed in this encounter.      No follow-ups on file.

## 2023-08-29 ENCOUNTER — PATIENT MESSAGE (OUTPATIENT)
Age: 68
End: 2023-08-29
Payer: MEDICARE

## 2023-08-29 LAB
25(OH)D3+25(OH)D2 SERPL-MCNC: 31.7 NG/ML (ref 30–100)
ALBUMIN SERPL-MCNC: 4.9 G/DL (ref 3.9–4.9)
ALBUMIN/GLOB SERPL: 2.1 {RATIO} (ref 1.2–2.2)
ALP SERPL-CCNC: 76 IU/L (ref 44–121)
ALT SERPL-CCNC: 14 IU/L (ref 0–32)
AST SERPL-CCNC: 21 IU/L (ref 0–40)
BILIRUB SERPL-MCNC: 0.5 MG/DL (ref 0–1.2)
BUN SERPL-MCNC: 16 MG/DL (ref 8–27)
BUN/CREAT SERPL: 21 (ref 12–28)
CALCIUM SERPL-MCNC: 10.2 MG/DL (ref 8.7–10.3)
CHLORIDE SERPL-SCNC: 101 MMOL/L (ref 96–106)
CHOLEST SERPL-MCNC: 230 MG/DL (ref 100–199)
CO2 SERPL-SCNC: 26 MMOL/L (ref 20–29)
CREAT SERPL-MCNC: 0.76 MG/DL (ref 0.57–1)
EGFRCR SERPLBLD CKD-EPI 2021: 84 ML/MIN/1.73
ERYTHROCYTE [DISTWIDTH] IN BLOOD BY AUTOMATED COUNT: 13 % (ref 11.7–15.4)
GLOBULIN SER CALC-MCNC: 2.3 G/DL (ref 1.5–4.5)
GLUCOSE SERPL-MCNC: 93 MG/DL (ref 70–99)
HCT VFR BLD AUTO: 43.2 % (ref 34–46.6)
HDLC SERPL-MCNC: 78 MG/DL
HGB BLD-MCNC: 14.4 G/DL (ref 11.1–15.9)
LDLC SERPL CALC-MCNC: 130 MG/DL (ref 0–99)
MCH RBC QN AUTO: 30.4 PG (ref 26.6–33)
MCHC RBC AUTO-ENTMCNC: 33.3 G/DL (ref 31.5–35.7)
MCV RBC AUTO: 91 FL (ref 79–97)
PLATELET # BLD AUTO: 307 X10E3/UL (ref 150–450)
POTASSIUM SERPL-SCNC: 4.8 MMOL/L (ref 3.5–5.2)
PROT SERPL-MCNC: 7.2 G/DL (ref 6–8.5)
RBC # BLD AUTO: 4.74 X10E6/UL (ref 3.77–5.28)
SODIUM SERPL-SCNC: 141 MMOL/L (ref 134–144)
TRIGL SERPL-MCNC: 128 MG/DL (ref 0–149)
TSH SERPL DL<=0.005 MIU/L-ACNC: 1.64 UIU/ML (ref 0.45–4.5)
VIT B12 SERPL-MCNC: 684 PG/ML (ref 232–1245)
VLDLC SERPL CALC-MCNC: 22 MG/DL (ref 5–40)
WBC # BLD AUTO: 6.3 X10E3/UL (ref 3.4–10.8)

## 2023-10-02 ENCOUNTER — TELEPHONE (OUTPATIENT)
Age: 68
End: 2023-10-02
Payer: MEDICARE

## 2023-10-02 NOTE — TELEPHONE ENCOUNTER
[facility] sent over communication that the Vitamin B-12 was denied as the diagnosis code(s) reported does not support the medical policy. The code originally selected for the test was Vitamin D deficiency.

## 2024-01-15 RX ORDER — SERTRALINE HYDROCHLORIDE 100 MG/1
150 TABLET, FILM COATED ORAL DAILY
Qty: 135 TABLET | Refills: 3 | OUTPATIENT
Start: 2024-01-15

## 2024-01-15 NOTE — TELEPHONE ENCOUNTER
Rx Refill Note  Requested Prescriptions     Pending Prescriptions Disp Refills    sertraline (ZOLOFT) 100 MG tablet [Pharmacy Med Name: SERTRALINE HYDROCHLORIDE 100 MG Tablet] 135 tablet 3     Sig: TAKE 1 AND 1/2 TABLETS EVERY DAY      Last office visit with prescribing clinician: 8/28/2023   Last telemedicine visit with prescribing clinician: Visit date not found   Next office visit with prescribing clinician: 2/28/2024                         Would you like a call back once the refill request has been completed: [] Yes [] No    If the office needs to give you a call back, can they leave a voicemail: [] Yes [] No    Magdalena Stafford MA  01/15/24, 07:52 EST    Called to find out who started patient on this medication and to schedule an appointment for refill left  for patient to return call    OK FOR HUB TO RELAY MESSAGE AND DOCUMENT OR SCHEDULE APPOINTMENT

## 2024-01-15 NOTE — TELEPHONE ENCOUNTER
MARGIE NAVARRO     Relationship: PATIENT     Best Callback Number:   [phone]     HUB PROVIDED THE RELAY MESSAGE FROM THE OFFICE   PATIENT VOICED UNDERSTANDING AND HAS NO FURTHER QUESTIONS AT THIS TIME    ADDITIONAL INFORMATION:   PATIENT SAID THE ZOLOFT BOTTLE HAS DR DENNISE MITTAL' NAME ON IT   PATIENT SAID SHE DOES NOT NEED ANY REFILLS RIGHT NOW   AND SHE DID NOT REQUEST THIS

## 2024-03-01 ENCOUNTER — LAB (OUTPATIENT)
Dept: LAB | Facility: HOSPITAL | Age: 69
End: 2024-03-01
Payer: MEDICARE

## 2024-03-01 DIAGNOSIS — E78.00 PURE HYPERCHOLESTEROLEMIA: ICD-10-CM

## 2024-03-02 LAB
CHOLEST SERPL-MCNC: 160 MG/DL (ref 0–200)
HDLC SERPL-MCNC: 82 MG/DL (ref 40–60)
LDLC SERPL CALC-MCNC: 66 MG/DL (ref 0–100)
TRIGL SERPL-MCNC: 61 MG/DL (ref 0–150)
VLDLC SERPL CALC-MCNC: 12 MG/DL (ref 5–40)

## 2024-03-30 DIAGNOSIS — I10 ESSENTIAL HYPERTENSION: Chronic | ICD-10-CM

## 2024-04-01 RX ORDER — ENALAPRIL MALEATE 20 MG/1
TABLET ORAL
Qty: 30 TABLET | Refills: 0 | Status: SHIPPED | OUTPATIENT
Start: 2024-04-01

## 2024-04-01 NOTE — TELEPHONE ENCOUNTER
"Name: Etelvina Prescott \"Verónica\"      Relationship: Self      Best Callback Number: [phone]       HUB PROVIDED THE RELAY MESSAGE FROM THE OFFICE      PATIENT: VOICED UNDERSTANDING AND HAS NO FURTHER QUESTIONS AT THIS TIME    ADDITIONAL INFORMATION: PATIENT WAS INFORMED THAT THEY ARE TO COME IN FOR A FOLLOW-UP    PATIENT STATES THAT THEY WILL CALL BACK ONCE THEY HAVE AN IDEA OF THEIR SCHEDULE TO SET THE APPOINTMENT    PLEASE BE ADVISED  "

## 2024-04-01 NOTE — TELEPHONE ENCOUNTER
Rx Refill Note  Requested Prescriptions     Pending Prescriptions Disp Refills    enalapril (VASOTEC) 20 MG tablet [Pharmacy Med Name: ENALAPRIL MALEATE 20 MG Tablet] 90 tablet 3     Sig: TAKE 1/2 TABLET TWICE DAILY      Last office visit with prescribing clinician: 8/28/2023   Last telemedicine visit with prescribing clinician: Visit date not found   Next office visit with prescribing clinician: Visit date not found     Palma Christianson Rep  04/01/24, 07:59 EDT    RELAY  Pt is due for month follow up

## 2024-04-01 NOTE — TELEPHONE ENCOUNTER
"Relay     \"30-day supply of medication sent to the pharmacy.  You will need to schedule an office visit for follow-up hypertension for further refills.\"                  "

## 2024-04-18 ENCOUNTER — OFFICE VISIT (OUTPATIENT)
Dept: ORTHOPEDIC SURGERY | Facility: CLINIC | Age: 69
End: 2024-04-18
Payer: MEDICARE

## 2024-04-18 VITALS
BODY MASS INDEX: 23.04 KG/M2 | WEIGHT: 130 LBS | DIASTOLIC BLOOD PRESSURE: 68 MMHG | SYSTOLIC BLOOD PRESSURE: 106 MMHG | HEIGHT: 63 IN

## 2024-04-18 DIAGNOSIS — M70.61 TROCHANTERIC BURSITIS OF RIGHT HIP: Primary | ICD-10-CM

## 2024-04-18 DIAGNOSIS — M16.11 PRIMARY OSTEOARTHRITIS OF RIGHT HIP: ICD-10-CM

## 2024-04-18 DIAGNOSIS — M25.551 RIGHT HIP PAIN: ICD-10-CM

## 2024-04-18 RX ORDER — TRIAMCINOLONE ACETONIDE 40 MG/ML
2 INJECTION, SUSPENSION INTRA-ARTICULAR; INTRAMUSCULAR
Status: COMPLETED | OUTPATIENT
Start: 2024-04-18 | End: 2024-04-18

## 2024-04-18 RX ORDER — BUPIVACAINE HYDROCHLORIDE 2.5 MG/ML
3 INJECTION, SOLUTION EPIDURAL; INFILTRATION; INTRACAUDAL
Status: COMPLETED | OUTPATIENT
Start: 2024-04-18 | End: 2024-04-18

## 2024-04-18 RX ORDER — LIDOCAINE HYDROCHLORIDE 10 MG/ML
3 INJECTION, SOLUTION EPIDURAL; INFILTRATION; INTRACAUDAL; PERINEURAL
Status: COMPLETED | OUTPATIENT
Start: 2024-04-18 | End: 2024-04-18

## 2024-04-18 RX ADMIN — BUPIVACAINE HYDROCHLORIDE 3 ML: 2.5 INJECTION, SOLUTION EPIDURAL; INFILTRATION; INTRACAUDAL at 09:46

## 2024-04-18 RX ADMIN — TRIAMCINOLONE ACETONIDE 2 ML: 40 INJECTION, SUSPENSION INTRA-ARTICULAR; INTRAMUSCULAR at 09:46

## 2024-04-18 RX ADMIN — LIDOCAINE HYDROCHLORIDE 3 ML: 10 INJECTION, SOLUTION EPIDURAL; INFILTRATION; INTRACAUDAL; PERINEURAL at 09:46

## 2024-04-18 NOTE — PROGRESS NOTES
Orthopaedic Clinic Note: Hip Established Patient    Chief Complaint   Patient presents with    Follow-up     10 month f/u; Trochanteric bursitis of right hip        HPI    It has been 10  month(s) since Ms. Prescott's last visit. She returns to clinic today for follow-up right hip pain.  Patient went trochanteric bursa injection at her last visit.  The injection provided about 6 months of relief.  Her pain is gradually returned.  She comes clinic today rating her pain 8/10 on the pain scale.  She is having recurrent pain localized to lateral trochanter and difficulty sleeping and performing daily activities due to the pain.  She is ambulating with no assistive device.  Denies fevers chills or constitutional symptoms.    Past Medical History:   Diagnosis Date    Allergic rhinitis     Arthritis of back ?    Breast cancer 2010    Right -Taxotere and Cytoxan ×4.  Tamoxifen.  Mastectomy    Breast cancer     Bursitis of hip Several months    Cataract 2021    Chronic anxiety Adulthood    Good response to citalopram    Chronic constipation Adulthood    MOM or MiraLAX    Chronic fatigue     Depression 1990    Good response to amitriptyline and citalopram    Diverticulosis 2017    Per colonoscopy    Dry eye syndrome 2010    Moisturizers    Heart murmur Mitrovalve    Herpes zoster     Hip arthrosis ?    History of exposure to tuberculosis Remote    PPDs 1993 onward all negative    Hypertension 2006    Impacted cerumen     Irritable bowel syndrome     Low back strain Ongoing    MVP (mitral valve prolapse) 1985    Recurring tachycardias    Nephrolithiasis 1985    Plantar fasciitis     Sarcoidosis 2003    Active in eyes and lungs    Shingles     Sleep disturbances 1990    Good response to amitriptyline and trazodone    Uterine bleeding 2006    Uterine ablation for bleeding and chronic iron deficiency      Past Surgical History:   Procedure Laterality Date    BREAST BIOPSY Right 1988    Benign    CATARACT EXTRACTION Bilateral 2021      SECTION   &     COLONOSCOPY  ,     Diverticulosis only.    COLONOSCOPY W/ POLYPECTOMY  Remote    DILATATION AND CURETTAGE  2016    ENDOMETRIAL ABLATION      HYSTEROSCOPY ENDOMETRIAL ABLATION  2006    persistant bleeding    MASTECTOMY Bilateral 2010    bilateral with implants, breast cancer on the right      Family History   Problem Relation Age of Onset    Alzheimer's disease Mother          age 89    Hypothyroidism Mother     Osteoporosis Mother     Pulmonary embolism Mother     Coronary artery disease Father     Dementia Father     Diabetes Father     Hypertension Father     Heart attack Father     Hypothyroidism Sister     Hypertension Sister     Breast cancer Maternal Grandmother     Stomach cancer Paternal Aunt     Lung cancer Paternal Aunt      Social History     Socioeconomic History    Marital status:      Spouse name: Woody   Tobacco Use    Smoking status: Never    Smokeless tobacco: Never   Vaping Use    Vaping status: Never Used   Substance and Sexual Activity    Alcohol use: Yes     Alcohol/week: 5.0 standard drinks of alcohol     Types: 5 Glasses of wine per week    Drug use: No    Sexual activity: Not Currently     Partners: Male      Current Outpatient Medications on File Prior to Visit   Medication Sig Dispense Refill    ALPRAZolam (XANAX) 0.5 MG tablet       atorvastatin (LIPITOR) 10 MG tablet Take 1 tablet by mouth Every Night. 90 tablet 3    Calcium Carbonate 1500 (600 Ca) MG tablet Calcium 600-D3 Plus (mag-zinc)   daily      cycloSPORINE (RESTASIS) 0.05 % ophthalmic emulsion Apply 1 drop to eye(s) as directed by provider Every 12 (Twelve) Hours.      diclofenac (VOLTAREN) 1 % gel gel Apply 4 g topically to the appropriate area as directed 4 (Four) Times a Day As Needed (hip pain). 100 g 1    enalapril (VASOTEC) 20 MG tablet TAKE 1/2 TABLET TWICE DAILY 30 tablet 0    fexofenadine (ALLEGRA) 180 MG tablet Take 1 tablet by mouth Daily As Needed.      hydrOXYzine  "(ATARAX) 25 MG tablet Take 1-2 tablets by mouth Every 6 (Six) Hours As Needed for Anxiety. 60 tablet 3    ibuprofen (ADVIL,MOTRIN) 400 MG tablet Take 1 tablet by mouth 2 (Two) Times a Day As Needed for Mild Pain . 180 tablet 1    Multiple Vitamin (MULTIVITAMIN) capsule Take  by mouth daily.      polyvinyl alcohol-povidone (HYPOTEARS) 1.4-0.6 % ophthalmic solution Apply 1 drop to eye(s) as directed by provider 2 (Two) Times a Day.      sertraline (ZOLOFT) 100 MG tablet Take 1 tablet by mouth Daily.      traZODone (DESYREL) 50 MG tablet Take 1 tablet by mouth every night at bedtime. 90 tablet 3    vitamin C (ASCORBIC ACID) 250 MG tablet Take 1 tablet by mouth Daily.      [DISCONTINUED] betamethasone valerate (VALISONE) 0.1 % cream betamethasone valerate 0.1 % topical cream   Apply to bilateral ear canals gently with a qtip 1-2 x weekly       No current facility-administered medications on file prior to visit.      Allergies   Allergen Reactions    Penicillins Rash    Sulfa Antibiotics Rash        Review of Systems   Constitutional: Negative.    HENT: Negative.     Eyes: Negative.    Respiratory: Negative.     Cardiovascular: Negative.    Gastrointestinal: Negative.    Endocrine: Negative.    Genitourinary: Negative.    Musculoskeletal:  Positive for arthralgias.   Skin: Negative.    Allergic/Immunologic: Negative.    Neurological: Negative.    Hematological: Negative.    Psychiatric/Behavioral: Negative.          The patient's Review of Systems was personally reviewed and confirmed as accurate.    Physical Exam  Blood pressure 106/68, height 160 cm (63\"), weight 59 kg (130 lb), not currently breastfeeding.    Body mass index is 23.03 kg/m².    GENERAL APPEARANCE: awake, alert, oriented, in no acute distress and well developed, well nourished  LUNGS:  breathing nonlabored  EXTREMITIES: no clubbing, cyanosis  PERIPHERAL PULSES: palpable dorsalis pedis and posterior tibial pulses bilaterally.    GAIT:  Antalgic          "   Hip Exam:  Right     RANGE OF MOTION:  EXTENSION/FLEXION:  normal (0-110 degrees)  IR (at 90 degrees of flexion):   10  ER (at 90 degrees of flexion):  40  PAIN WITH HIP MOTION:  no  PAIN WITH LOGROLL:  no      STINCHFIELD TEST: negative     STRENGTH:  ABDUCTOR:    5/5  ADDUCTOR:  5/5  HIP FLEXION:  5/5     GREATER TROCHANTER BURSAL PAIN:  yes    SENSATION TO LIGHT TOUCH:  DEEP PERONEAL/SUPERFICIAL PERONEAL/SURAL/SAPHENOUS/TIBIAL:   intact    EDEMA:  no  ERYTHEMA:  no  WOUNDS/INCISIONS:   no  _________________________________________________________________  _________________________________________________________________    RADIOGRAPHIC FINDINGS:   Indication: Right hip pain    Comparison: Todays xrays were compared to previous xrays from 6/29/2023    AP pelvis: Right: moderate joint space narrowing,  minimal osteophyte formation and No significant changes compared to prior radiographs.;Left: moderate joint space narrowing,  there are marginal osteophytes visualized at the femoral head-neck junction and acetabular margins and No significant changes compared to prior radiographs.      Assessment/Plan:   Diagnosis Plan   1. Trochanteric bursitis of right hip        2. Right hip pain  XR Hip With or Without Pelvis 2 - 3 View Right      3. Primary osteoarthritis of right hip          Patient has moderate but asymptomatic hip arthritis with well-preserved range of motion.  She is experiencing recurrent trochanteric bursitis of the right hip.  I discussed treatment options with her.  She is agreeable to trochanteric bursa cortisone injection the right hip today.  I also discussed exercise program for hip strengthening of the abductor muscles.  She will follow-up with me as needed.    Procedure Note:  I discussed with the patient the potential benefits of performing a therapeutic injection of the right hip trochanteric bursa as well as potential risks including but not limited to infection, swelling, pain, bleeding,  bruising, nerve/vessel damage, skin color changes, transient elevation in blood glucose levels, and fat atrophy. After informed consent and verifying correct patient, procedure site, and type of procedure, the area was prepped with alcohol, ethyl chloride was used to numb the skin. Via the direct lateral approach, 3 cc of 1% lidocaine, 3 cc of 0.25% Marcaine and 2 cc of 40mg/ml of Kenalog were injected into the right hip trochanteric bursa. The patient tolerated the procedure well. There were no complications. A sterile dressing was placed over the injection site.      Dao Stover MD  04/18/24  09:48 EDT

## 2024-04-18 NOTE — PROGRESS NOTES
Procedure   - Large Joint Arthrocentesis: R greater trochanteric bursa on 4/18/2024 9:46 AM  Indications: pain  Details: 22 G (spinal) needle, lateral approach  Medications: 3 mL bupivacaine (PF) 0.25 %; 3 mL lidocaine PF 1% 1 %; 2 mL triamcinolone acetonide 40 MG/ML  Outcome: tolerated well, no immediate complications  Procedure, treatment alternatives, risks and benefits explained, specific risks discussed. Consent was given by the patient. Immediately prior to procedure a time out was called to verify the correct patient, procedure, equipment, support staff and site/side marked as required. Patient was prepped and draped in the usual sterile fashion.

## 2024-04-22 DIAGNOSIS — I10 ESSENTIAL HYPERTENSION: Chronic | ICD-10-CM

## 2024-04-22 RX ORDER — ENALAPRIL MALEATE 20 MG/1
TABLET ORAL
Qty: 30 TABLET | Refills: 0 | Status: SHIPPED | OUTPATIENT
Start: 2024-04-22 | End: 2024-04-23 | Stop reason: SDUPTHER

## 2024-04-22 NOTE — TELEPHONE ENCOUNTER
Rx Refill Note  Requested Prescriptions     Pending Prescriptions Disp Refills    enalapril (VASOTEC) 20 MG tablet [Pharmacy Med Name: ENALAPRIL MALEATE 20 MG Tablet] 30 tablet 11     Sig: TAKE 1/2 TABLET TWICE DAILY (NEED MD APPOINTMENT)      Last office visit with prescribing clinician: 8/28/2023   Last telemedicine visit with prescribing clinician: Visit date not found   Next office visit with prescribing clinician: Visit date not found     Palma Christianson Rep  04/22/24, 08:04 EDT    Called pt and scheduled apt for 04/23/2024, stated she felt like she had something else to do and would call back if this date was not good for her to reschedule   Last refill was sent in last month for a months supply until apt.

## 2024-04-23 ENCOUNTER — OFFICE VISIT (OUTPATIENT)
Age: 69
End: 2024-04-23
Payer: MEDICARE

## 2024-04-23 VITALS
WEIGHT: 131.7 LBS | HEIGHT: 63 IN | SYSTOLIC BLOOD PRESSURE: 142 MMHG | BODY MASS INDEX: 23.34 KG/M2 | OXYGEN SATURATION: 96 % | HEART RATE: 89 BPM | DIASTOLIC BLOOD PRESSURE: 72 MMHG

## 2024-04-23 DIAGNOSIS — I10 ESSENTIAL HYPERTENSION: Primary | Chronic | ICD-10-CM

## 2024-04-23 DIAGNOSIS — E78.00 PURE HYPERCHOLESTEROLEMIA: ICD-10-CM

## 2024-04-23 DIAGNOSIS — F41.1 GAD (GENERALIZED ANXIETY DISORDER): ICD-10-CM

## 2024-04-23 DIAGNOSIS — R35.0 URINARY FREQUENCY: ICD-10-CM

## 2024-04-23 LAB
BILIRUB BLD-MCNC: NEGATIVE MG/DL
CLARITY, POC: CLEAR
COLOR UR: YELLOW
EXPIRATION DATE: NORMAL
GLUCOSE UR STRIP-MCNC: NEGATIVE MG/DL
KETONES UR QL: NEGATIVE
LEUKOCYTE EST, POC: NEGATIVE
Lab: NORMAL
NITRITE UR-MCNC: NEGATIVE MG/ML
PH UR: 6.5 [PH] (ref 5–8)
PROT UR STRIP-MCNC: NEGATIVE MG/DL
RBC # UR STRIP: NEGATIVE /UL
SP GR UR: 1.01 (ref 1–1.03)
UROBILINOGEN UR QL: NORMAL

## 2024-04-23 PROCEDURE — 1160F RVW MEDS BY RX/DR IN RCRD: CPT | Performed by: FAMILY MEDICINE

## 2024-04-23 PROCEDURE — 99214 OFFICE O/P EST MOD 30 MIN: CPT | Performed by: FAMILY MEDICINE

## 2024-04-23 PROCEDURE — 3078F DIAST BP <80 MM HG: CPT | Performed by: FAMILY MEDICINE

## 2024-04-23 PROCEDURE — 3077F SYST BP >= 140 MM HG: CPT | Performed by: FAMILY MEDICINE

## 2024-04-23 PROCEDURE — 81003 URINALYSIS AUTO W/O SCOPE: CPT | Performed by: FAMILY MEDICINE

## 2024-04-23 PROCEDURE — 1159F MED LIST DOCD IN RCRD: CPT | Performed by: FAMILY MEDICINE

## 2024-04-23 PROCEDURE — G2211 COMPLEX E/M VISIT ADD ON: HCPCS | Performed by: FAMILY MEDICINE

## 2024-04-23 RX ORDER — ENALAPRIL MALEATE 20 MG/1
20 TABLET ORAL NIGHTLY
Qty: 90 TABLET | Refills: 1 | Status: SHIPPED | OUTPATIENT
Start: 2024-04-23

## 2024-04-23 RX ORDER — HYDROXYZINE HYDROCHLORIDE 25 MG/1
25-50 TABLET, FILM COATED ORAL EVERY 6 HOURS PRN
Qty: 60 TABLET | Refills: 3 | Status: SHIPPED | OUTPATIENT
Start: 2024-04-23

## 2024-04-23 RX ORDER — ATORVASTATIN CALCIUM 10 MG/1
10 TABLET, FILM COATED ORAL NIGHTLY
Qty: 90 TABLET | Refills: 3 | Status: SHIPPED | OUTPATIENT
Start: 2024-04-23

## 2024-04-23 NOTE — PROGRESS NOTES
"Chief Complaint  Hypertension and Urinary Frequency (3 days )    Subjective        Etelvina Prescott presents to [facility]  Hypertension  This is a chronic problem. The current episode started more than 1 year ago. The problem has been stable since onset. The problem is controlled. Pertinent negatives include no anxiety, blurred vision, chest pain, headaches, malaise/fatigue, orthopnea, palpitations, peripheral edema or shortness of breath. Agents associated with hypertension include NSAIDs. Risk factors for coronary artery disease include dyslipidemia, family history and stress. There are no compliance problems.    Urinary Frequency   This is a new problem. The current episode started in the past 7 days. The problem occurs daily. The patient is experiencing no pain. There has been no fever. Associated symptoms include frequency and urgency.       Answers submitted by the patient for this visit:  Primary Reason for Visit (Submitted on 4/22/2024)  What is the primary reason for your visit?: High Blood Pressure  Review of Systems   Constitutional:  Negative for malaise/fatigue.   Eyes:  Negative for blurred vision.   Respiratory:  Negative for shortness of breath.    Cardiovascular:  Negative for chest pain, palpitations and orthopnea.   Gastrointestinal:  Positive for constipation.   Genitourinary:  Positive for frequency, pelvic pain and urgency.       She had a steroid shot for her hip last week. Since then she wakes up with urgency to urinate at night. This morning woke up at 3am and several times early this morning. She has chronic pelvic pain intermittently for 4 years. She sees GYN yearly. She has upcoming ovarian screening later this year. Bowels are ok.     No home blood pressure monitoring.     Taking atorvastatin for cholesterol nightly.     She uses hydroxyzine occasional for anxiety.       Objective   Vital Signs:  /72   Pulse 89   Ht 160 cm (62.99\")   Wt 59.7 kg " "(131 lb 11.2 oz)   SpO2 96%   BMI 23.34 kg/m²   Estimated body mass index is 23.34 kg/m² as calculated from the following:    Height as of this encounter: 160 cm (62.99\").    Weight as of this encounter: 59.7 kg (131 lb 11.2 oz).       BMI is within normal parameters. No other follow-up for BMI required.  Vitals:    04/23/24 1007 04/23/24 1020   BP: 140/70 142/72   Pulse: 89    SpO2: 96%    Weight: 59.7 kg (131 lb 11.2 oz)    Height: 160 cm (62.99\")    PainSc:   2    PainLoc: Hip          Physical Exam  Vitals reviewed.   Constitutional:       General: She is not in acute distress.     Appearance: She is not ill-appearing.   Cardiovascular:      Rate and Rhythm: Normal rate and regular rhythm.   Pulmonary:      Effort: Pulmonary effort is normal.      Breath sounds: Normal breath sounds.   Abdominal:      General: Abdomen is flat.      Palpations: Abdomen is soft.      Tenderness:  in the suprapubic area There is no guarding or rebound.   Neurological:      Mental Status: She is alert.   Psychiatric:         Mood and Affect: Mood normal.        Result Review :    The following data was reviewed by: Celina Ferrera MD on 04/23/2024:        Results for orders placed or performed in visit on 04/23/24   POC Urinalysis Dipstick, Automated    Specimen: Urine   Result Value Ref Range    Color Yellow Yellow, Straw, Dark Yellow, Sara    Clarity, UA Clear Clear    Specific Gravity  1.010 1.005 - 1.030    pH, Urine 6.5 5.0 - 8.0    Leukocytes Negative Negative    Nitrite, UA Negative Negative    Protein, POC Negative Negative mg/dL    Glucose, UA Negative Negative mg/dL    Ketones, UA Negative Negative    Urobilinogen, UA Normal Normal, 0.2 E.U./dL    Bilirubin Negative Negative    Blood, UA Negative Negative    Lot Number [id number]     Expiration Date 01/14/2025               Assessment and Plan     Diagnoses and all orders for this visit:    1. Essential hypertension (Primary)  -     enalapril (VASOTEC) 20 MG " tablet; Take 1 tablet by mouth Every Night.  Dispense: 90 tablet; Refill: 1  Uncontrolled in the office.  Possibly related to recent steroid injection.  I have asked her to check her blood pressure at home over the next 2 weeks and call in with readings.  Goal blood pressure less than 140/90.  If it remains elevated I will increase to enalapril 40 mg daily.  2. Pure hypercholesterolemia  -     atorvastatin (LIPITOR) 10 MG tablet; Take 1 tablet by mouth Every Night.  Dispense: 90 tablet; Refill: 3  Continue statin.    3. Urinary frequency  -     POC Urinalysis Dipstick, Automated  Urinalysis without signs of infection.  No need for antibiotics.  4. ANISHA (generalized anxiety disorder)  -     hydrOXYzine (ATARAX) 25 MG tablet; Take 1-2 tablets by mouth Every 6 (Six) Hours As Needed for Anxiety.  Dispense: 60 tablet; Refill: 3  Stable.           Follow Up     Return in about 4 months (around 8/29/2024) for MWV and fasting labs.  Patient was given instructions and counseling regarding her condition or for health maintenance advice. Please see specific information pulled into the AVS if appropriate.     Electronically signed by Celina Ferrera MD, 04/23/24, 10:28 AM EDT.

## 2024-06-13 DIAGNOSIS — F51.01 PRIMARY INSOMNIA: ICD-10-CM

## 2024-06-13 RX ORDER — TRAZODONE HYDROCHLORIDE 50 MG/1
50 TABLET ORAL
Qty: 90 TABLET | Refills: 3 | Status: SHIPPED | OUTPATIENT
Start: 2024-06-13

## 2024-06-13 NOTE — TELEPHONE ENCOUNTER
Rx Refill Note  Requested Prescriptions     Pending Prescriptions Disp Refills    traZODone (DESYREL) 50 MG tablet [Pharmacy Med Name: TRAZODONE HYDROCHLORIDE 50 MG Tablet] 90 tablet 3     Sig: TAKE 1 TABLET EVERY NIGHT AT BEDTIME      Last office visit with prescribing clinician: 4/23/2024   Last telemedicine visit with prescribing clinician: Visit date not found   Next office visit with prescribing clinician: 9/18/2024     Palma Christianson Rep  06/13/24, 07:49 EDT    Rx sent

## 2024-09-13 ENCOUNTER — OFFICE VISIT (OUTPATIENT)
Dept: ORTHOPEDIC SURGERY | Facility: CLINIC | Age: 69
End: 2024-09-13
Payer: MEDICARE

## 2024-09-13 VITALS
HEIGHT: 63 IN | BODY MASS INDEX: 23.74 KG/M2 | SYSTOLIC BLOOD PRESSURE: 142 MMHG | WEIGHT: 134 LBS | DIASTOLIC BLOOD PRESSURE: 82 MMHG

## 2024-09-13 DIAGNOSIS — M70.61 TROCHANTERIC BURSITIS OF RIGHT HIP: Primary | ICD-10-CM

## 2024-09-13 DIAGNOSIS — M16.11 PRIMARY OSTEOARTHRITIS OF RIGHT HIP: ICD-10-CM

## 2024-09-13 RX ORDER — TRIAMCINOLONE ACETONIDE 40 MG/ML
80 INJECTION, SUSPENSION INTRA-ARTICULAR; INTRAMUSCULAR
Status: COMPLETED | OUTPATIENT
Start: 2024-09-13 | End: 2024-09-13

## 2024-09-13 RX ORDER — BUPIVACAINE HYDROCHLORIDE 5 MG/ML
3 INJECTION, SOLUTION EPIDURAL; INTRACAUDAL
Status: COMPLETED | OUTPATIENT
Start: 2024-09-13 | End: 2024-09-13

## 2024-09-13 RX ORDER — LIDOCAINE HYDROCHLORIDE 10 MG/ML
3 INJECTION, SOLUTION EPIDURAL; INFILTRATION; INTRACAUDAL; PERINEURAL
Status: COMPLETED | OUTPATIENT
Start: 2024-09-13 | End: 2024-09-13

## 2024-09-13 RX ADMIN — LIDOCAINE HYDROCHLORIDE 3 ML: 10 INJECTION, SOLUTION EPIDURAL; INFILTRATION; INTRACAUDAL; PERINEURAL at 11:40

## 2024-09-13 RX ADMIN — TRIAMCINOLONE ACETONIDE 80 MG: 40 INJECTION, SUSPENSION INTRA-ARTICULAR; INTRAMUSCULAR at 11:40

## 2024-09-13 RX ADMIN — BUPIVACAINE HYDROCHLORIDE 3 ML: 5 INJECTION, SOLUTION EPIDURAL; INTRACAUDAL at 11:40

## 2024-09-13 NOTE — PROGRESS NOTES
Procedure   - Large Joint Arthrocentesis: R greater trochanteric bursa on 9/13/2024 11:40 AM  Indications: pain  Details: 22 G needle, lateral approach  Medications: 80 mg triamcinolone acetonide 40 MG/ML; 3 mL lidocaine PF 1% 1 %; 3 mL bupivacaine (PF) 0.5 %  Outcome: tolerated well, no immediate complications  Procedure, treatment alternatives, risks and benefits explained, specific risks discussed. Consent was given by the patient. Immediately prior to procedure a time out was called to verify the correct patient, procedure, equipment, support staff and site/side marked as required. Patient was prepped and draped in the usual sterile fashion.

## 2024-09-13 NOTE — PROGRESS NOTES
Orthopaedic Clinic Note: Hip Established Patient    Chief Complaint   Patient presents with    Follow-up     5 month f/u-Trochanteric bursitis of right hip        HPI    It has been 5  month(s) since Ms. Prescott's last visit. She returns to clinic today for follow-up right hip pain.  Patient went trochanteric bursa cortisone injection the right hip 5 months ago.  The injection worked well for about 4 months.  Over the last month her pain is returned.  She rates it a 6/10 on the pain scale today.  She is ambulating with no assistive device.  Denies fevers chills or constitutional symptoms.  Overall she is doing about the same.      Past Medical History:   Diagnosis Date    Allergic rhinitis     Arthritis of back ?    Breast cancer 2010    Right -Taxotere and Cytoxan ×4.  Tamoxifen.  Mastectomy    Breast cancer     Bursitis of hip Several months    Cataract     Chronic anxiety Adulthood    Good response to citalopram    Chronic constipation Adulthood    MOM or MiraLAX    Chronic fatigue     Depression     Good response to amitriptyline and citalopram    Diverticulosis 2017    Per colonoscopy    Dry eye syndrome 2010    Moisturizers    Heart murmur Mitrovalve    Herpes zoster     Hip arthrosis ?    History of exposure to tuberculosis Remote    PPDs  onward all negative    Hypertension 2006    Impacted cerumen     Irritable bowel syndrome     Low back strain Ongoing    MVP (mitral valve prolapse) 1985    Recurring tachycardias    Nephrolithiasis 1985    Plantar fasciitis     Sarcoidosis 2003    Active in eyes and lungs    Shingles     Sleep disturbances 1990    Good response to amitriptyline and trazodone    Uterine bleeding 2006    Uterine ablation for bleeding and chronic iron deficiency      Past Surgical History:   Procedure Laterality Date    BREAST BIOPSY Right 1988    Benign    CATARACT EXTRACTION Bilateral      SECTION   &     COLONOSCOPY  ,     Diverticulosis only.     COLONOSCOPY W/ POLYPECTOMY  Remote    DILATATION AND CURETTAGE  2016    ENDOMETRIAL ABLATION      HYSTEROSCOPY ENDOMETRIAL ABLATION  2006    persistant bleeding    MASTECTOMY Bilateral 2010    bilateral with implants, breast cancer on the right      Family History   Problem Relation Age of Onset    Alzheimer's disease Mother          age 89    Hypothyroidism Mother     Osteoporosis Mother     Pulmonary embolism Mother     Coronary artery disease Father     Dementia Father     Diabetes Father     Hypertension Father     Heart attack Father     Hypothyroidism Sister     Hypertension Sister     Breast cancer Maternal Grandmother     Stomach cancer Paternal Aunt     Lung cancer Paternal Aunt      Social History     Socioeconomic History    Marital status:      Spouse name: Woody   Tobacco Use    Smoking status: Never    Smokeless tobacco: Never   Vaping Use    Vaping status: Never Used   Substance and Sexual Activity    Alcohol use: Yes     Alcohol/week: 5.0 standard drinks of alcohol     Types: 5 Glasses of wine per week    Drug use: No    Sexual activity: Not Currently     Partners: Male      Current Outpatient Medications on File Prior to Visit   Medication Sig Dispense Refill    ALPRAZolam (XANAX) 0.5 MG tablet       atorvastatin (LIPITOR) 10 MG tablet Take 1 tablet by mouth Every Night. 90 tablet 3    Calcium Carbonate 1500 (600 Ca) MG tablet Calcium 600-D3 Plus (mag-zinc)   daily      cycloSPORINE (RESTASIS) 0.05 % ophthalmic emulsion Apply 1 drop to eye(s) as directed by provider Every 12 (Twelve) Hours.      diclofenac (VOLTAREN) 1 % gel gel Apply 4 g topically to the appropriate area as directed 4 (Four) Times a Day As Needed (hip pain). 100 g 1    enalapril (VASOTEC) 20 MG tablet Take 1 tablet by mouth Every Night. 90 tablet 1    fexofenadine (ALLEGRA) 180 MG tablet Take 1 tablet by mouth Daily As Needed.      hydrOXYzine (ATARAX) 25 MG tablet Take 1-2 tablets by mouth Every 6 (Six) Hours As Needed  "for Anxiety. 60 tablet 3    ibuprofen (ADVIL,MOTRIN) 400 MG tablet Take 1 tablet by mouth 2 (Two) Times a Day As Needed for Mild Pain . 180 tablet 1    Multiple Vitamin (MULTIVITAMIN) capsule Take  by mouth daily.      polyvinyl alcohol-povidone (HYPOTEARS) 1.4-0.6 % ophthalmic solution Apply 1 drop to eye(s) as directed by provider 2 (Two) Times a Day.      sertraline (ZOLOFT) 100 MG tablet Take 1 tablet by mouth Daily.      traZODone (DESYREL) 50 MG tablet TAKE 1 TABLET EVERY NIGHT AT BEDTIME 90 tablet 3    vitamin C (ASCORBIC ACID) 250 MG tablet Take 1 tablet by mouth Daily.       No current facility-administered medications on file prior to visit.      Allergies   Allergen Reactions    Penicillins Rash    Sulfa Antibiotics Rash        Review of Systems   Constitutional: Negative.    HENT: Negative.     Eyes: Negative.    Respiratory: Negative.     Cardiovascular: Negative.    Gastrointestinal: Negative.    Endocrine: Negative.    Genitourinary: Negative.    Musculoskeletal:  Positive for arthralgias.   Skin: Negative.    Allergic/Immunologic: Negative.    Neurological: Negative.    Hematological: Negative.    Psychiatric/Behavioral: Negative.          The patient's Review of Systems was personally reviewed and confirmed as accurate.    Physical Exam  Blood pressure 142/82, height 160 cm (62.99\"), weight 60.8 kg (134 lb), not currently breastfeeding.    Body mass index is 23.74 kg/m².    GENERAL APPEARANCE: awake, alert, oriented, in no acute distress and well developed, well nourished  LUNGS:  breathing nonlabored  EXTREMITIES: no clubbing, cyanosis  PERIPHERAL PULSES: palpable dorsalis pedis and posterior tibial pulses bilaterally.    GAIT:  Antalgic            Hip Exam:  Right     RANGE OF MOTION:  EXTENSION/FLEXION:  normal (0-110 degrees)  IR (at 90 degrees of flexion):   10  ER (at 90 degrees of flexion):  40  PAIN WITH HIP MOTION:  no  PAIN WITH LOGROLL:  no      STINCHFIELD TEST: negative   "   STRENGTH:  ABDUCTOR:    5/5  ADDUCTOR:  5/5  HIP FLEXION:  5/5     GREATER TROCHANTER BURSAL PAIN:  yes    SENSATION TO LIGHT TOUCH:  DEEP PERONEAL/SUPERFICIAL PERONEAL/SURAL/SAPHENOUS/TIBIAL:   intact    EDEMA:  no  ERYTHEMA:  no  WOUNDS/INCISIONS:   no  _________________________________________________________________  _________________________________________________________________    RADIOGRAPHIC FINDINGS:   No new imaging today    Assessment/Plan:   Diagnosis Plan   1. Trochanteric bursitis of right hip        2. Primary osteoarthritis of right hip          Patient experiencing recurrent trochanteric bursitis of the right hip.  She does have mild arthritis that remains asymptomatic on exam today with well-preserved range of motion.  I discussed repeat trochanteric bursa cortisone injection today as well as home exercise program.  She is agreeable to this.  She will follow-up as needed.    Procedure Note:  I discussed with the patient the potential benefits of performing a therapeutic injection of the right hip trochanteric bursa as well as potential risks including but not limited to infection, swelling, pain, bleeding, bruising, nerve/vessel damage, skin color changes, transient elevation in blood glucose levels, and fat atrophy. After informed consent and verifying correct patient, procedure site, and type of procedure, the area was prepped with alcohol, ethyl chloride was used to numb the skin. Via the direct lateral approach, 3 cc of 1% lidocaine, 3 cc of 0.25% Marcaine and 2 cc of 40mg/ml of Kenalog were injected into the right hip trochanteric bursa. The patient tolerated the procedure well. There were no complications. A sterile dressing was placed over the injection site.      Dao Stover MD  09/13/24  12:02 EDT

## 2024-09-16 DIAGNOSIS — I10 ESSENTIAL HYPERTENSION: Chronic | ICD-10-CM

## 2024-09-16 RX ORDER — ENALAPRIL MALEATE 20 MG/1
20 TABLET ORAL NIGHTLY
Qty: 90 TABLET | Refills: 3 | OUTPATIENT
Start: 2024-09-16

## 2024-09-18 ENCOUNTER — OFFICE VISIT (OUTPATIENT)
Age: 69
End: 2024-09-18
Payer: MEDICARE

## 2024-09-18 ENCOUNTER — LAB (OUTPATIENT)
Age: 69
End: 2024-09-18
Payer: MEDICARE

## 2024-09-18 VITALS
SYSTOLIC BLOOD PRESSURE: 126 MMHG | RESPIRATION RATE: 16 BRPM | BODY MASS INDEX: 23.39 KG/M2 | HEART RATE: 86 BPM | WEIGHT: 132 LBS | DIASTOLIC BLOOD PRESSURE: 80 MMHG | OXYGEN SATURATION: 98 % | HEIGHT: 63 IN

## 2024-09-18 DIAGNOSIS — R73.01 ELEVATED FASTING GLUCOSE: ICD-10-CM

## 2024-09-18 DIAGNOSIS — I10 ESSENTIAL HYPERTENSION: Chronic | ICD-10-CM

## 2024-09-18 DIAGNOSIS — D69.2 SENILE PURPURA: ICD-10-CM

## 2024-09-18 DIAGNOSIS — Z00.00 MEDICARE ANNUAL WELLNESS VISIT, SUBSEQUENT: Primary | ICD-10-CM

## 2024-09-18 DIAGNOSIS — E55.9 VITAMIN D DEFICIENCY DISEASE: Primary | ICD-10-CM

## 2024-09-18 DIAGNOSIS — E78.00 PURE HYPERCHOLESTEROLEMIA: ICD-10-CM

## 2024-09-18 DIAGNOSIS — F41.8 SITUATIONAL ANXIETY: ICD-10-CM

## 2024-09-18 DIAGNOSIS — Z13.0 SCREENING FOR DEFICIENCY ANEMIA: ICD-10-CM

## 2024-09-18 DIAGNOSIS — F33.42 MAJOR DEPRESSIVE DISORDER, RECURRENT, IN FULL REMISSION: ICD-10-CM

## 2024-09-18 DIAGNOSIS — E53.8 COBALAMIN DEFICIENCY: Chronic | ICD-10-CM

## 2024-09-18 DIAGNOSIS — I10 ESSENTIAL HYPERTENSION, MALIGNANT: ICD-10-CM

## 2024-09-18 DIAGNOSIS — E53.8 BIOTIN-(PROPIONYL-COA-CARBOXYLASE) LIGASE DEFICIENCY: ICD-10-CM

## 2024-09-18 DIAGNOSIS — E55.9 VITAMIN D DEFICIENCY: Chronic | ICD-10-CM

## 2024-09-18 DIAGNOSIS — Z23 IMMUNIZATION DUE: ICD-10-CM

## 2024-09-18 DIAGNOSIS — F51.01 PRIMARY INSOMNIA: ICD-10-CM

## 2024-09-18 PROCEDURE — 85027 COMPLETE CBC AUTOMATED: CPT | Performed by: FAMILY MEDICINE

## 2024-09-18 PROCEDURE — 1125F AMNT PAIN NOTED PAIN PRSNT: CPT | Performed by: FAMILY MEDICINE

## 2024-09-18 PROCEDURE — G0439 PPPS, SUBSEQ VISIT: HCPCS | Performed by: FAMILY MEDICINE

## 2024-09-18 PROCEDURE — 99397 PER PM REEVAL EST PAT 65+ YR: CPT | Performed by: FAMILY MEDICINE

## 2024-09-18 PROCEDURE — 80053 COMPREHEN METABOLIC PANEL: CPT | Performed by: FAMILY MEDICINE

## 2024-09-18 PROCEDURE — 82306 VITAMIN D 25 HYDROXY: CPT | Performed by: FAMILY MEDICINE

## 2024-09-18 PROCEDURE — 1160F RVW MEDS BY RX/DR IN RCRD: CPT | Performed by: FAMILY MEDICINE

## 2024-09-18 PROCEDURE — 1159F MED LIST DOCD IN RCRD: CPT | Performed by: FAMILY MEDICINE

## 2024-09-18 PROCEDURE — 3074F SYST BP LT 130 MM HG: CPT | Performed by: FAMILY MEDICINE

## 2024-09-18 PROCEDURE — 3079F DIAST BP 80-89 MM HG: CPT | Performed by: FAMILY MEDICINE

## 2024-09-18 PROCEDURE — 83036 HEMOGLOBIN GLYCOSYLATED A1C: CPT | Performed by: FAMILY MEDICINE

## 2024-09-18 PROCEDURE — 84443 ASSAY THYROID STIM HORMONE: CPT | Performed by: FAMILY MEDICINE

## 2024-09-18 PROCEDURE — 96160 PT-FOCUSED HLTH RISK ASSMT: CPT | Performed by: FAMILY MEDICINE

## 2024-09-18 PROCEDURE — 36415 COLL VENOUS BLD VENIPUNCTURE: CPT | Performed by: FAMILY MEDICINE

## 2024-09-18 PROCEDURE — 80061 LIPID PANEL: CPT | Performed by: FAMILY MEDICINE

## 2024-09-18 PROCEDURE — 82607 VITAMIN B-12: CPT | Performed by: FAMILY MEDICINE

## 2024-09-18 RX ORDER — ATORVASTATIN CALCIUM 10 MG/1
10 TABLET, FILM COATED ORAL NIGHTLY
Qty: 90 TABLET | Refills: 3 | Status: SHIPPED | OUTPATIENT
Start: 2024-09-18

## 2024-09-18 RX ORDER — ENALAPRIL MALEATE 20 MG/1
20 TABLET ORAL NIGHTLY
Qty: 90 TABLET | Refills: 1 | Status: SHIPPED | OUTPATIENT
Start: 2024-09-18

## 2024-09-18 RX ORDER — TRAZODONE HYDROCHLORIDE 50 MG/1
75 TABLET, FILM COATED ORAL
Qty: 135 TABLET | Refills: 3 | Status: SHIPPED | OUTPATIENT
Start: 2024-09-18

## 2024-09-18 RX ORDER — TRAZODONE HYDROCHLORIDE 50 MG/1
50 TABLET, FILM COATED ORAL
Qty: 135 TABLET | Refills: 3 | Status: SHIPPED | OUTPATIENT
Start: 2024-09-18 | End: 2024-09-18 | Stop reason: SDUPTHER

## 2024-09-19 DIAGNOSIS — R73.01 ELEVATED FASTING GLUCOSE: Primary | ICD-10-CM

## 2024-09-19 LAB
25(OH)D3 SERPL-MCNC: 41.5 NG/ML (ref 30–100)
ALBUMIN SERPL-MCNC: 4.8 G/DL (ref 3.5–5.2)
ALBUMIN/GLOB SERPL: 1.7 G/DL
ALP SERPL-CCNC: 74 U/L (ref 39–117)
ALT SERPL W P-5'-P-CCNC: 18 U/L (ref 1–33)
ANION GAP SERPL CALCULATED.3IONS-SCNC: 12 MMOL/L (ref 5–15)
AST SERPL-CCNC: 18 U/L (ref 1–32)
BILIRUB SERPL-MCNC: 0.5 MG/DL (ref 0–1.2)
BUN SERPL-MCNC: 16 MG/DL (ref 8–23)
BUN/CREAT SERPL: 22.2 (ref 7–25)
CALCIUM SPEC-SCNC: 10.6 MG/DL (ref 8.6–10.5)
CHLORIDE SERPL-SCNC: 102 MMOL/L (ref 98–107)
CHOLEST SERPL-MCNC: 175 MG/DL (ref 0–200)
CO2 SERPL-SCNC: 27 MMOL/L (ref 22–29)
CREAT SERPL-MCNC: 0.72 MG/DL (ref 0.57–1)
DEPRECATED RDW RBC AUTO: 41.5 FL (ref 37–54)
EGFRCR SERPLBLD CKD-EPI 2021: 89.5 ML/MIN/1.73
ERYTHROCYTE [DISTWIDTH] IN BLOOD BY AUTOMATED COUNT: 12.2 % (ref 12.3–15.4)
GLOBULIN UR ELPH-MCNC: 2.8 GM/DL
GLUCOSE SERPL-MCNC: 103 MG/DL (ref 65–99)
HBA1C MFR BLD: 5.7 % (ref 4.8–5.6)
HCT VFR BLD AUTO: 45 % (ref 34–46.6)
HDLC SERPL-MCNC: 76 MG/DL (ref 40–60)
HGB BLD-MCNC: 14.7 G/DL (ref 12–15.9)
LDLC SERPL CALC-MCNC: 85 MG/DL (ref 0–100)
LDLC/HDLC SERPL: 1.1 {RATIO}
MCH RBC QN AUTO: 30.5 PG (ref 26.6–33)
MCHC RBC AUTO-ENTMCNC: 32.7 G/DL (ref 31.5–35.7)
MCV RBC AUTO: 93.4 FL (ref 79–97)
PLATELET # BLD AUTO: 311 10*3/MM3 (ref 140–450)
PMV BLD AUTO: 10.7 FL (ref 6–12)
POTASSIUM SERPL-SCNC: 4.1 MMOL/L (ref 3.5–5.2)
PROT SERPL-MCNC: 7.6 G/DL (ref 6–8.5)
RBC # BLD AUTO: 4.82 10*6/MM3 (ref 3.77–5.28)
SODIUM SERPL-SCNC: 141 MMOL/L (ref 136–145)
TRIGL SERPL-MCNC: 76 MG/DL (ref 0–150)
TSH SERPL DL<=0.05 MIU/L-ACNC: 1.15 UIU/ML (ref 0.27–4.2)
VIT B12 BLD-MCNC: 656 PG/ML (ref 211–946)
VLDLC SERPL-MCNC: 14 MG/DL (ref 5–40)
WBC NRBC COR # BLD AUTO: 7.81 10*3/MM3 (ref 3.4–10.8)

## 2024-09-23 ENCOUNTER — TELEPHONE (OUTPATIENT)
Age: 69
End: 2024-09-23
Payer: MEDICARE

## 2024-10-23 ENCOUNTER — TELEPHONE (OUTPATIENT)
Dept: ORTHOPEDIC SURGERY | Facility: CLINIC | Age: 69
End: 2024-10-23
Payer: MEDICARE

## 2024-10-23 NOTE — TELEPHONE ENCOUNTER
Pt returned call and I let her know Abiola recommended her try PT. I told her if she let us know a place we can send over the PT order. She said she would call or message us back on [employer] with where she would like to go.     Maribel Ricketts CMA ([facility])

## 2024-10-23 NOTE — TELEPHONE ENCOUNTER
"RADHA     Received call from patient stating the injections she had in her right hip on 9/13/24, have not worked or helped after about two weeks. Per patient she would like to know what her next steps should be - possibly physical therapy, another injection or whatever her provider MRL believes should be the way to proceed. Patient also stated two weeks after the injection, she went to [employer] and walked about 21,000 steps. Patient is wonder if that may have possibly over-irritated her hip or if she did too much in such a short time frame, post injections. Patient stated that at the time, it did not hurt nor did she feel any discomfort. However, since then the pain has returned, dull for the most part but sharp depending on how she lays. Patient denies any other symptoms accompanied with the pain. Patient states she does exercise two days a week, with some of her classes involving \"pressure.\" Overall, she would like to know if she is overcompensating, especially with the left hip occasionally hurting. Please advise at your convenience. Thank you kindly!   "

## 2024-10-23 NOTE — TELEPHONE ENCOUNTER
I would suggest she try PT.  Please let me know where she would like to go and I will place an order.

## 2024-10-25 ENCOUNTER — TELEPHONE (OUTPATIENT)
Dept: ORTHOPEDIC SURGERY | Facility: CLINIC | Age: 69
End: 2024-10-25
Payer: MEDICARE

## 2024-10-25 DIAGNOSIS — M70.61 GREATER TROCHANTERIC BURSITIS OF RIGHT HIP: Primary | ICD-10-CM

## 2024-10-25 NOTE — TELEPHONE ENCOUNTER
"    Caller: Etelvina Prescott \"Verónica\"    Relationship: Self    Best call back number: [phone] (home) [phone]       What is the medical concern/diagnosis: RIGHT HIP PAIN      What specialty or service is being requested:  PHYSICAL THERAPY      What is the provider, practice or medical service name: [facility]     What is the office location: [facility] (RIGHT NEAR WHERE THE PATIENT LIVES)    What is the office phone number: PATIENT DID NOT PROVIDE PHONE NUMBER FOR LOCATION     Any additional details:         "

## 2024-10-25 NOTE — TELEPHONE ENCOUNTER
I called the patient to let her know that  placed the PT order . She should receive a call in the next few days to get scheduled . I did advise she call the PT place if she hadn't heard anything by next week. She verbalized understanding and appreciation .

## 2024-12-17 ENCOUNTER — OFFICE VISIT (OUTPATIENT)
Dept: ORTHOPEDIC SURGERY | Facility: CLINIC | Age: 69
End: 2024-12-17
Payer: MEDICARE

## 2024-12-17 VITALS
DIASTOLIC BLOOD PRESSURE: 74 MMHG | BODY MASS INDEX: 23.39 KG/M2 | HEIGHT: 63 IN | SYSTOLIC BLOOD PRESSURE: 124 MMHG | WEIGHT: 132 LBS

## 2024-12-17 DIAGNOSIS — M16.11 PRIMARY OSTEOARTHRITIS OF RIGHT HIP: ICD-10-CM

## 2024-12-17 DIAGNOSIS — M70.61 GREATER TROCHANTERIC BURSITIS OF RIGHT HIP: Primary | ICD-10-CM

## 2024-12-17 RX ORDER — LIDOCAINE HYDROCHLORIDE 10 MG/ML
3 INJECTION, SOLUTION EPIDURAL; INFILTRATION; INTRACAUDAL; PERINEURAL
Status: COMPLETED | OUTPATIENT
Start: 2024-12-17 | End: 2024-12-17

## 2024-12-17 RX ORDER — TRIAMCINOLONE ACETONIDE 40 MG/ML
80 INJECTION, SUSPENSION INTRA-ARTICULAR; INTRAMUSCULAR
Status: COMPLETED | OUTPATIENT
Start: 2024-12-17 | End: 2024-12-17

## 2024-12-17 RX ORDER — BUPIVACAINE HYDROCHLORIDE 2.5 MG/ML
3 INJECTION, SOLUTION EPIDURAL; INFILTRATION; INTRACAUDAL
Status: COMPLETED | OUTPATIENT
Start: 2024-12-17 | End: 2024-12-17

## 2024-12-17 RX ADMIN — BUPIVACAINE HYDROCHLORIDE 3 ML: 2.5 INJECTION, SOLUTION EPIDURAL; INFILTRATION; INTRACAUDAL at 16:21

## 2024-12-17 RX ADMIN — TRIAMCINOLONE ACETONIDE 80 MG: 40 INJECTION, SUSPENSION INTRA-ARTICULAR; INTRAMUSCULAR at 16:21

## 2024-12-17 RX ADMIN — LIDOCAINE HYDROCHLORIDE 3 ML: 10 INJECTION, SOLUTION EPIDURAL; INFILTRATION; INTRACAUDAL; PERINEURAL at 16:21

## 2024-12-17 NOTE — PROGRESS NOTES
Orthopaedic Clinic Note: Hip Established Patient    Chief Complaint   Patient presents with    Follow-up     3 month follow up--Trochanteric bursitis of right hip        HPI    It has been 3  month(s) since Ms. Prescott's last visit. She returns to clinic today for follow-up right hip pain.  Patient with trochanteric bursa cortisone injection 3 months ago.  The injection provided about 3 weeks of relief.  Her pain has returned.  She rates her pain 6/10 on the pain scale.  Localized to the lateral trochanter.  She is ambulating with no assistive device.  Denies pain in the groin.  Overall she is doing worse.    Past Medical History:   Diagnosis Date    Allergic     Allergic rhinitis     Arthritis of back ?    Breast cancer 2010    Right -Taxotere and Cytoxan ×4.  Tamoxifen.  Mastectomy    Breast cancer     Bursitis of hip Several months    Cataract     Chronic anxiety Adulthood    Good response to citalopram    Chronic constipation Adulthood    MOM or MiraLAX    Chronic fatigue     Depression     Good response to amitriptyline and citalopram    Diverticulosis     Per colonoscopy    Dry eye syndrome 2010    Moisturizers    Heart murmur Mitrovalve    Herpes zoster     Hip arthrosis ?    History of exposure to tuberculosis Remote    PPDs  onward all negative    Hypertension 2006    Impacted cerumen     Irritable bowel syndrome     Low back strain Ongoing    MVP (mitral valve prolapse) 1985    Recurring tachycardias    Nephrolithiasis     Plantar fasciitis     Sarcoidosis 2003    Active in eyes and lungs    Shingles     Sleep disturbances 1990    Good response to amitriptyline and trazodone    Uterine bleeding 2006    Uterine ablation for bleeding and chronic iron deficiency      Past Surgical History:   Procedure Laterality Date    BREAST BIOPSY Right 1988    Benign    CATARACT EXTRACTION Bilateral      SECTION   &     COLONOSCOPY  2017    Diverticulosis only.    COLONOSCOPY  W/ POLYPECTOMY  Remote    DILATATION AND CURETTAGE  2016    ENDOMETRIAL ABLATION      HYSTEROSCOPY ENDOMETRIAL ABLATION  2006    persistant bleeding    MASTECTOMY Bilateral 2010    bilateral with implants, breast cancer on the right      Family History   Problem Relation Age of Onset    Alzheimer's disease Mother          age 89    Hypothyroidism Mother     Osteoporosis Mother     Pulmonary embolism Mother     Anxiety disorder Mother     Coronary artery disease Father     Dementia Father     Diabetes Father     Hypertension Father     Heart attack Father     Hypothyroidism Sister     Hypertension Sister     Breast cancer Maternal Grandmother     Stomach cancer Paternal Aunt     Lung cancer Paternal Aunt      Social History     Socioeconomic History    Marital status:      Spouse name: Woody   Tobacco Use    Smoking status: Never    Smokeless tobacco: Never   Vaping Use    Vaping status: Never Used   Substance and Sexual Activity    Alcohol use: Yes     Alcohol/week: 5.0 standard drinks of alcohol     Types: 5 Glasses of wine per week    Drug use: No    Sexual activity: Not Currently     Partners: Male      Current Outpatient Medications on File Prior to Visit   Medication Sig Dispense Refill    ALPRAZolam (XANAX) 0.5 MG tablet       atorvastatin (LIPITOR) 10 MG tablet Take 1 tablet by mouth Every Night. 90 tablet 3    Calcium Carbonate 1500 (600 Ca) MG tablet Calcium 600-D3 Plus (mag-zinc)   daily      cycloSPORINE (RESTASIS) 0.05 % ophthalmic emulsion Apply 1 drop to eye(s) as directed by provider Every 12 (Twelve) Hours.      enalapril (VASOTEC) 20 MG tablet Take 1 tablet by mouth Every Night. 90 tablet 1    fexofenadine (ALLEGRA) 180 MG tablet Take 1 tablet by mouth Daily As Needed.      hydrOXYzine (ATARAX) 25 MG tablet Take 1-2 tablets by mouth Every 6 (Six) Hours As Needed for Anxiety. 60 tablet 3    ibuprofen (ADVIL,MOTRIN) 400 MG tablet Take 1 tablet by mouth 2 (Two) Times a Day As Needed for Mild  "Pain . 180 tablet 1    Multiple Vitamin (MULTIVITAMIN) capsule Take  by mouth daily.      polyvinyl alcohol-povidone (HYPOTEARS) 1.4-0.6 % ophthalmic solution Apply 1 drop to eye(s) as directed by provider 2 (Two) Times a Day.      sertraline (ZOLOFT) 100 MG tablet Take 1 tablet by mouth Daily.      traZODone (DESYREL) 50 MG tablet Take 1.5 tablets by mouth every night at bedtime. 135 tablet 3    vitamin C (ASCORBIC ACID) 250 MG tablet Take 1 tablet by mouth Daily.       No current facility-administered medications on file prior to visit.      Allergies   Allergen Reactions    Penicillins Rash    Sulfa Antibiotics Rash        Review of Systems     The patient's Review of Systems was personally reviewed and confirmed as accurate.    Physical Exam  Blood pressure 124/74, height 160 cm (62.99\"), weight 59.9 kg (132 lb), not currently breastfeeding.    Body mass index is 23.39 kg/m².    GENERAL APPEARANCE: awake, alert, oriented, in no acute distress and well developed, well nourished  LUNGS:  breathing nonlabored  EXTREMITIES: no clubbing, cyanosis  PERIPHERAL PULSES: palpable dorsalis pedis and posterior tibial pulses bilaterally.    GAIT:  Antalgic            Hip Exam:  Right     RANGE OF MOTION:  EXTENSION/FLEXION:  normal (0-110 degrees)  IR (at 90 degrees of flexion):   15  ER (at 90 degrees of flexion):  40  PAIN WITH HIP MOTION:  no  PAIN WITH LOGROLL:  no      STINCHFIELD TEST: negative     STRENGTH:  ABDUCTOR:    5/5  ADDUCTOR:  5/5  HIP FLEXION:  5/5     GREATER TROCHANTER BURSAL PAIN:  yes    SENSATION TO LIGHT TOUCH:  DEEP PERONEAL/SUPERFICIAL PERONEAL/SURAL/SAPHENOUS/TIBIAL:   intact    EDEMA:  no  ERYTHEMA:  no  WOUNDS/INCISIONS:   no  _________________________________________________________________  _________________________________________________________________    RADIOGRAPHIC FINDINGS:   Indication: Right hip pain    Comparison: Todays xrays were compared to previous xrays from 4/18/2024    AP " pelvis: Right: moderate joint space narrowing,  minimal osteophyte formation and No significant changes compared to prior radiographs.;Left: moderate joint space narrowing,  minimal osteophyte formation and No significant changes compared to prior radiographs.      Assessment/Plan:   Diagnosis Plan   1. Greater trochanteric bursitis of right hip  XR Hip With or Without Pelvis 2 - 3 View Right      2. Primary osteoarthritis of right hip          Patient has moderate arthritis of the right hip that is asymptomatic with well-preserved range of motion.  She is suffering from trochanteric bursitis.  I discussed home exercise program for hip strengthening and IT band stretching.  She is agreeable to this.  In addition to this we will proceed with repeat trochanteric bursa cortisone injection today.  She will follow-up in 3 months for repeat assessment.    Procedure Note:  I discussed with the patient the potential benefits of performing a therapeutic injection of the right hip trochanteric bursa as well as potential risks including but not limited to infection, swelling, pain, bleeding, bruising, nerve/vessel damage, skin color changes, transient elevation in blood glucose levels, and fat atrophy. After informed consent and verifying correct patient, procedure site, and type of procedure, the area was prepped with alcohol, ethyl chloride was used to numb the skin. Via the direct lateral approach, 3 cc of 1% lidocaine, 3 cc of 0.25% Marcaine and 2 cc of 40mg/ml of Kenalog were injected into the right hip trochanteric bursa. The patient tolerated the procedure well. There were no complications. A sterile dressing was placed over the injection site.      Dao Stover MD  12/17/24  16:22 EST

## 2024-12-17 NOTE — PROGRESS NOTES
Procedure   - Large Joint Arthrocentesis: R greater trochanteric bursa on 12/17/2024 4:21 PM  Indications: pain  Details: 21 G needle, lateral approach  Medications: 3 mL bupivacaine (PF) 0.25 %; 3 mL lidocaine PF 1% 1 %; 80 mg triamcinolone acetonide 40 MG/ML  Outcome: tolerated well, no immediate complications  Procedure, treatment alternatives, risks and benefits explained, specific risks discussed. Consent was given by the patient. Immediately prior to procedure a time out was called to verify the correct patient, procedure, equipment, support staff and site/side marked as required. Patient was prepped and draped in the usual sterile fashion.

## 2025-01-28 NOTE — TELEPHONE ENCOUNTER
"Caller: Etelvina Prescott \"Verónica\"    Relationship: Self    Best call back number: [phone]     Requested Prescriptions:   Requested Prescriptions     Pending Prescriptions Disp Refills    sertraline (ZOLOFT) 100 MG tablet       Sig: Take 1 tablet by mouth Daily.        Pharmacy where request should be sent: [facility] [id number] [address] - [phone]  - [phone]     Last office visit with prescribing clinician: 9/18/2024   Last telemedicine visit with prescribing clinician: Visit date not found   Next office visit with prescribing clinician: 3/19/2025     Additional details provided by patient: PATIENT HAS 5 PILLS ON HAND.       Does the patient have less than a 3 day supply:  [] Yes  [x] No    Would you like a call back once the refill request has been completed: [] Yes [] No    If the office needs to give you a call back, can they leave a voicemail: [] Yes [] No    Palma Almonte Rep   01/28/25 15:57 EST           "

## 2025-01-28 NOTE — TELEPHONE ENCOUNTER
"Caller: Etelvina Prescott \"Verónica\"    Relationship: Self    Best call back number: [phone]     Which medication are you concerned about: traZODone (DESYREL) 50 MG tablet LAST FILL 12/23/24.  PATIENT IS REQUESTING TO HAVE THIS INCREASED  MG TAB .    sertraline (ZOLOFT) 100 MG tablet  atorvastatin (LIPITOR) 10 MG tablet  enalapril (VASOTEC) 20 MG tablet      Who prescribed you this medication: DR. ZUNIGA    When did you start taking this medication:FOR A LONG TIME    What are your concerns: PATIENT IS REQUESTING TO HAVE A THESE SCRIPTS SENT TO  [facility] - [address] [phone] [facility] [phone] .    THE PATIENT HAS CHANGED INSURANCE SO PATIENT HAD TO CHANGE PHARMACY.    PLEASE CALL PATIENT ONCE THIS HAS BEEN COMPLETED    How long have you had these concerns:         "

## 2025-01-29 NOTE — TELEPHONE ENCOUNTER
"Name: Etelvina Prescott \"Verónica\"      Relationship: Self      Best Callback Number: [phone]     HUB PROVIDED THE RELAY MESSAGE FROM THE OFFICE  RELAY  Called to inform pt in order to get increases on medications she will need to come in for an apt. No answer left a vm to return call.       PATIENT: VOICED UNDERSTANDING AND HAS NO FURTHER QUESTIONS AT THIS TIME    ADDITIONAL INFORMATION:  WILL SPEAK TO  AT [id number] APPT     "

## 2025-01-29 NOTE — TELEPHONE ENCOUNTER
RELAY  Called to inform pt in order to get increases on medications she will need to come in for an apt. No answer left a vm to return call.

## 2025-01-30 RX ORDER — SERTRALINE HYDROCHLORIDE 100 MG/1
100 TABLET, FILM COATED ORAL DAILY
OUTPATIENT
Start: 2025-01-30

## 2025-02-04 ENCOUNTER — OFFICE VISIT (OUTPATIENT)
Age: 70
End: 2025-02-04
Payer: MEDICARE

## 2025-02-04 VITALS
HEIGHT: 63 IN | BODY MASS INDEX: 22.55 KG/M2 | WEIGHT: 127.25 LBS | HEART RATE: 94 BPM | OXYGEN SATURATION: 97 % | SYSTOLIC BLOOD PRESSURE: 122 MMHG | DIASTOLIC BLOOD PRESSURE: 80 MMHG

## 2025-02-04 DIAGNOSIS — E78.00 PURE HYPERCHOLESTEROLEMIA: ICD-10-CM

## 2025-02-04 DIAGNOSIS — F33.42 MAJOR DEPRESSIVE DISORDER, RECURRENT, IN FULL REMISSION: ICD-10-CM

## 2025-02-04 DIAGNOSIS — F41.8 SITUATIONAL ANXIETY: ICD-10-CM

## 2025-02-04 DIAGNOSIS — I10 ESSENTIAL HYPERTENSION: Chronic | ICD-10-CM

## 2025-02-04 DIAGNOSIS — F51.01 PRIMARY INSOMNIA: Primary | ICD-10-CM

## 2025-02-04 PROCEDURE — 99214 OFFICE O/P EST MOD 30 MIN: CPT | Performed by: FAMILY MEDICINE

## 2025-02-04 PROCEDURE — 3074F SYST BP LT 130 MM HG: CPT | Performed by: FAMILY MEDICINE

## 2025-02-04 PROCEDURE — 3079F DIAST BP 80-89 MM HG: CPT | Performed by: FAMILY MEDICINE

## 2025-02-04 PROCEDURE — 1125F AMNT PAIN NOTED PAIN PRSNT: CPT | Performed by: FAMILY MEDICINE

## 2025-02-04 PROCEDURE — G2211 COMPLEX E/M VISIT ADD ON: HCPCS | Performed by: FAMILY MEDICINE

## 2025-02-04 PROCEDURE — 1160F RVW MEDS BY RX/DR IN RCRD: CPT | Performed by: FAMILY MEDICINE

## 2025-02-04 PROCEDURE — 1159F MED LIST DOCD IN RCRD: CPT | Performed by: FAMILY MEDICINE

## 2025-02-04 RX ORDER — ATORVASTATIN CALCIUM 10 MG/1
10 TABLET, FILM COATED ORAL NIGHTLY
Qty: 90 TABLET | Refills: 3 | Status: SHIPPED | OUTPATIENT
Start: 2025-02-04

## 2025-02-04 RX ORDER — SERTRALINE HYDROCHLORIDE 100 MG/1
100 TABLET, FILM COATED ORAL DAILY
Qty: 90 TABLET | Refills: 3 | Status: SHIPPED | OUTPATIENT
Start: 2025-02-04

## 2025-02-04 RX ORDER — SERTRALINE HYDROCHLORIDE 100 MG/1
100 TABLET, FILM COATED ORAL DAILY
Qty: 30 TABLET | Refills: 0 | Status: SHIPPED | OUTPATIENT
Start: 2025-02-04 | End: 2025-03-06

## 2025-02-04 RX ORDER — TRAZODONE HYDROCHLORIDE 100 MG/1
100 TABLET ORAL NIGHTLY
Qty: 90 TABLET | Refills: 3 | Status: SHIPPED | OUTPATIENT
Start: 2025-02-04

## 2025-02-04 RX ORDER — ENALAPRIL MALEATE 20 MG/1
20 TABLET ORAL NIGHTLY
Qty: 90 TABLET | Refills: 1 | Status: SHIPPED | OUTPATIENT
Start: 2025-02-04

## 2025-02-04 NOTE — PROGRESS NOTES
"Chief Complaint  Anxiety and Depression    Subjective        Etelvina Prescott presents to [facility]  History of Present Illness    History of Present Illness  The patient is a 69-year-old female presenting for anxiety, depression, insomnia, and hypertension.    She reports an improvement in her sleep pattern since the holiday season, with a typical duration of 5 to 6 hours per night. She has been managing her insomnia with trazodone, initially at a dose of 50 mg, which was increased to 75 mg last summer. However, due to persistent sleep disturbances, she escalated the dose to 100 mg. She anticipates needing a refill of this medication in approximately one month.    She expresses a desire to discontinue sertraline but has recently increased the dose to 100 mg. She is seeking a one-week supply of sertraline to sustain her through an upcoming vacation. She also requests that her long-term medications be transferred to her new mail order pharmacy.     MEDICATIONS  Current: Trazodone, sertraline, enalapril, atorvastatin.    Objective   Vital Signs:  /80 (BP Location: Left arm, Patient Position: Sitting, Cuff Size: Adult)   Pulse 94   Ht 160 cm (62.99\")   Wt 57.7 kg (127 lb 4 oz)   SpO2 97%   BMI 22.55 kg/m²   Estimated body mass index is 22.55 kg/m² as calculated from the following:    Height as of this encounter: 160 cm (62.99\").    Weight as of this encounter: 57.7 kg (127 lb 4 oz).    BMI is within normal parameters. No other follow-up for BMI required.      The following portions of the patient's history were reviewed and updated as appropriate: allergies, current medications, past family history, past medical history, past social history, past surgical history, and problem list.       Physical Exam  Vitals reviewed.   Constitutional:       General: She is not in acute distress.     Appearance: She is not ill-appearing.   Cardiovascular:      Rate and Rhythm: Normal rate " and regular rhythm.   Pulmonary:      Effort: Pulmonary effort is normal.      Breath sounds: Normal breath sounds.   Neurological:      Mental Status: She is alert.   Psychiatric:         Mood and Affect: Mood is not anxious or depressed. Affect is angry.         Speech: Speech normal.         Behavior: Behavior is cooperative.      Comments: irritable        Result Review :                Assessment and Plan   Diagnoses and all orders for this visit:    1. Primary insomnia (Primary)    2. Major depressive disorder, recurrent, in full remission    3. Situational anxiety    4. Essential hypertension  -     enalapril (VASOTEC) 20 MG tablet; Take 1 tablet by mouth Every Night.  Dispense: 90 tablet; Refill: 1    5. Pure hypercholesterolemia  -     atorvastatin (LIPITOR) 10 MG tablet; Take 1 tablet by mouth Every Night.  Dispense: 90 tablet; Refill: 3    Other orders  -     sertraline (ZOLOFT) 100 MG tablet; Take 1 tablet by mouth Daily.  Dispense: 90 tablet; Refill: 3  -     sertraline (ZOLOFT) 100 MG tablet; Take 1 tablet by mouth Daily for 30 days.  Dispense: 30 tablet; Refill: 0  -     traZODone (DESYREL) 100 MG tablet; Take 1 tablet by mouth Every Night.  Dispense: 90 tablet; Refill: 3             Assessment & Plan  1. Anxiety and depression.  Her condition remains stable with the current regimen of sertraline 100 mg daily. A prescription for sertraline 100 mg has been sent to [facility] for a 30-day supply, and long-term medications will be managed through mail order.    2. Primary insomnia.  Her insomnia has shown signs of worsening. The dosage of trazodone has been increased to 100 mg nightly.     3. Hypertension.  Her hypertension is currently under control. She will continue with the Enalapril 20 mg. A follow-up appointment is scheduled for September 2025.    4. Hyperlipidemia.  She will continue with the atorvastatin 10 mg at night.     Follow-up  The patient will follow up in September 2025.      Follow Up    Return in about 8 months (around 9/24/2025) for MWV and fasting labs, as scheduled.  Patient was given instructions and counseling regarding her condition or for health maintenance advice. Please see specific information pulled into the AVS if appropriate.         Patient or patient representative verbalized consent for the use of Ambient Listening during the visit with  Celina Ferrera MD for chart documentation. 2/4/2025  09:52 EST    Electronically signed by Celina Ferrera MD, 02/04/25, 9:52 AM EST.

## 2025-03-04 RX ORDER — SERTRALINE HYDROCHLORIDE 100 MG/1
100 TABLET, FILM COATED ORAL DAILY
Qty: 30 TABLET | Refills: 0 | OUTPATIENT
Start: 2025-03-04

## 2025-03-04 NOTE — TELEPHONE ENCOUNTER
Rx Refill Note  Requested Prescriptions     Pending Prescriptions Disp Refills    sertraline (ZOLOFT) 100 MG tablet [Pharmacy Med Name: SERTRALINE  MG TABLET] 30 tablet 0     Sig: TAKE 1 TABLET BY MOUTH DAILY      Last office visit with prescribing clinician: 2/4/2025   Last telemedicine visit with prescribing clinician: Visit date not found   Next office visit with prescribing clinician: 9/24/2025     Cinthia Dickerson MA  03/04/25, 12:42 EST    Rx declined, medication was sent in on 02/04/25 with a quantity of 90 and 3 refills.

## 2025-04-15 ENCOUNTER — TELEPHONE (OUTPATIENT)
Age: 70
End: 2025-04-15
Payer: MEDICARE

## 2025-04-17 ENCOUNTER — OFFICE VISIT (OUTPATIENT)
Age: 70
End: 2025-04-17
Payer: MEDICARE

## 2025-04-17 VITALS
HEART RATE: 83 BPM | OXYGEN SATURATION: 97 % | HEIGHT: 63 IN | BODY MASS INDEX: 23.29 KG/M2 | SYSTOLIC BLOOD PRESSURE: 118 MMHG | WEIGHT: 131.44 LBS | DIASTOLIC BLOOD PRESSURE: 74 MMHG

## 2025-04-17 DIAGNOSIS — F33.1 MAJOR DEPRESSIVE DISORDER, RECURRENT EPISODE, MODERATE DEGREE: Primary | ICD-10-CM

## 2025-04-17 RX ORDER — SERTRALINE HYDROCHLORIDE 25 MG/1
25 TABLET, FILM COATED ORAL DAILY
Qty: 90 TABLET | Refills: 1 | Status: SHIPPED | OUTPATIENT
Start: 2025-04-17

## 2025-04-17 NOTE — PROGRESS NOTES
Chief Complaint  Depression    Subjective        Etelvina Prescott presents to [facility]  History of Present Illness  Little interest or pleasure in doing things? Several days   Feeling down, depressed, or hopeless? Not at all   PHQ-2 Total Score 1        History of Present Illness  The patient is a 70-year-old female presenting for a follow-up of depression.    Significant mood disturbances were reported during the winter months, particularly around the holiday season, accompanied by sleep difficulties. Mood improved following a cruise with college friends, but the previous state returned upon coming back home. Fatigue from persistent sadness and resentment is expressed, along with feeling energized during a 2-week break. A 12-day period 2 years ago was mentioned when she left her  due to the strain of his illness. Sertraline has been taken for several years, currently at a 100 mg dose. Consideration is given to whether an increase in dosage or the addition of another medication might be beneficial. Effexor was recalled as a past prescription. Sertraline has been taken for several years, but it was not effective. Zoloft has been used for approximately 3 to 4 years. Counseling was sought 3 years ago, where a medication change was advised. The initial medication led to heart arrhythmia, necessitating cardiologist visits, and subsequently, Zoloft was prescribed. Complete satisfaction with Zoloft's efficacy has never been achieved.    Her  was diagnosed with multiple sclerosis in 1989, and his condition has been managed while raising two small children. His health deteriorated rapidly, but he continued working as an  from home. Within 6 weeks of his diagnosis, a breakdown was experienced, leading to medical help, which improved her condition for a significant period. However, struggles continue with her 's declining health and increased care needs, despite  "assistance from two caregivers who visit twice weekly. Her  has been wheelchair-bound for several years and has been receiving wound care for over 2 years.    Objective   Vital Signs:  /74 (BP Location: Left arm, Patient Position: Sitting, Cuff Size: Adult)   Pulse 83   Ht 160 cm (62.99\")   Wt 59.6 kg (131 lb 7 oz)   SpO2 97%   BMI 23.29 kg/m²   Estimated body mass index is 23.29 kg/m² as calculated from the following:    Height as of this encounter: 160 cm (62.99\").    Weight as of this encounter: 59.6 kg (131 lb 7 oz).    BMI is within normal parameters. No other follow-up for BMI required.      The following portions of the patient's history were reviewed and updated as appropriate: allergies, current medications, past family history, past medical history, past social history, past surgical history, and problem list.       Physical Exam  Vitals reviewed.   Constitutional:       Appearance: She is not ill-appearing.   Cardiovascular:      Rate and Rhythm: Normal rate and regular rhythm.   Pulmonary:      Effort: Pulmonary effort is normal.      Breath sounds: Normal breath sounds.   Neurological:      Mental Status: She is alert.   Psychiatric:         Mood and Affect: Mood is depressed. Affect is angry and tearful.         Speech: Speech normal.         Behavior: Behavior is not agitated, slowed, aggressive, withdrawn, hyperactive or combative.        Result Review :                Assessment and Plan   Diagnoses and all orders for this visit:    1. Major depressive disorder, recurrent episode, moderate degree (Primary)  -     sertraline (ZOLOFT) 25 MG tablet; Take 1 tablet by mouth Daily.  Dispense: 90 tablet; Refill: 1             Assessment & Plan  1. Depression.  - Persistent symptoms of depression and chronic stress despite being on sertraline 100 mg for several years.  - Physical exam findings include normal vital signs and a good heart rate.  - Discussed options including increasing the " sertraline dose or switching to an SNRI like Pristiq or Cymbalta. Advised to monitor for side effects such as lightheadedness, dizziness, gastrointestinal upset, or diarrhea.  - Sertraline dose will be increased by 25 mg, adding a second pill to the current regimen.  Total dose sertraline 125 mg daily.  Follow-up visit scheduled for 09/2025 or sooner if necessary.      Follow Up   Return in about 5 months (around 9/24/2025) for as scheduled.  Patient was given instructions and counseling regarding her condition or for health maintenance advice. Please see specific information pulled into the AVS if appropriate.         Patient or patient representative verbalized consent for the use of Ambient Listening during the visit with  Celina Ferrera MD for chart documentation. 4/17/2025  10:00 EDT    Electronically signed by Celina Ferrera MD, 04/17/25, 10:02 AM EDT.

## 2025-06-03 ENCOUNTER — OFFICE VISIT (OUTPATIENT)
Dept: ORTHOPEDIC SURGERY | Facility: CLINIC | Age: 70
End: 2025-06-03
Payer: MEDICARE

## 2025-06-03 VITALS
WEIGHT: 129.6 LBS | SYSTOLIC BLOOD PRESSURE: 118 MMHG | HEIGHT: 63 IN | BODY MASS INDEX: 22.96 KG/M2 | DIASTOLIC BLOOD PRESSURE: 74 MMHG

## 2025-06-03 DIAGNOSIS — M16.11 PRIMARY OSTEOARTHRITIS OF RIGHT HIP: ICD-10-CM

## 2025-06-03 DIAGNOSIS — M70.61 GREATER TROCHANTERIC BURSITIS OF RIGHT HIP: Primary | ICD-10-CM

## 2025-06-03 RX ORDER — BUPIVACAINE HYDROCHLORIDE 2.5 MG/ML
3 INJECTION, SOLUTION EPIDURAL; INFILTRATION; INTRACAUDAL; PERINEURAL
Status: COMPLETED | OUTPATIENT
Start: 2025-06-03 | End: 2025-06-03

## 2025-06-03 RX ORDER — LIDOCAINE HYDROCHLORIDE 10 MG/ML
3 INJECTION, SOLUTION EPIDURAL; INFILTRATION; INTRACAUDAL; PERINEURAL
Status: COMPLETED | OUTPATIENT
Start: 2025-06-03 | End: 2025-06-03

## 2025-06-03 RX ORDER — TRIAMCINOLONE ACETONIDE 40 MG/ML
80 INJECTION, SUSPENSION INTRA-ARTICULAR; INTRAMUSCULAR
Status: COMPLETED | OUTPATIENT
Start: 2025-06-03 | End: 2025-06-03

## 2025-06-03 RX ADMIN — LIDOCAINE HYDROCHLORIDE 3 ML: 10 INJECTION, SOLUTION EPIDURAL; INFILTRATION; INTRACAUDAL; PERINEURAL at 15:00

## 2025-06-03 RX ADMIN — BUPIVACAINE HYDROCHLORIDE 3 ML: 2.5 INJECTION, SOLUTION EPIDURAL; INFILTRATION; INTRACAUDAL; PERINEURAL at 15:00

## 2025-06-03 RX ADMIN — TRIAMCINOLONE ACETONIDE 80 MG: 40 INJECTION, SUSPENSION INTRA-ARTICULAR; INTRAMUSCULAR at 15:00

## 2025-06-03 NOTE — PROGRESS NOTES
Procedure   - Large Joint Arthrocentesis: R greater trochanteric bursa on 6/3/2025 3:00 PM  Indications: pain  Details: 22 G needle, lateral approach  Medications: 3 mL lidocaine PF 1% 1 %; 80 mg triamcinolone acetonide 40 MG/ML; 3 mL bupivacaine (PF) 0.25 %  Outcome: tolerated well, no immediate complications  Procedure, treatment alternatives, risks and benefits explained, specific risks discussed. Consent was given by the patient. Immediately prior to procedure a time out was called to verify the correct patient, procedure, equipment, support staff and site/side marked as required. Patient was prepped and draped in the usual sterile fashion.

## 2025-06-03 NOTE — PROGRESS NOTES
Orthopaedic Clinic Note: Hip Established Patient    Chief Complaint   Patient presents with    Follow-up     5 month f/u-Trochanteric bursitis of right hip        HPI    It has been 5  month(s) since Ms. Prescott's last visit. She returns to clinic today for follow-up right hip pain.  Patient with trochanteric bursa cortisone injection right hip 5 months ago.  Injection provided about 4 months of relief before pain returned.  She comes clinic today rating her pain a 5/10 on the pain scale.  She localizes it to the lateral trochanter.  Denies pain in the groin.  She is ambulating with no assistive device.  Denies fevers chills or constitutional symptoms.  Overall she is doing about the same.      Past Medical History:   Diagnosis Date    Allergic     Allergic rhinitis     Arthritis of back ?    Breast cancer 2010    Right -Taxotere and Cytoxan ×4.  Tamoxifen.  Mastectomy    Breast cancer     Bursitis of hip Several months    Cataract     Chronic anxiety Adulthood    Good response to citalopram    Chronic constipation Adulthood    MOM or MiraLAX    Chronic fatigue     Depression 1990    Good response to amitriptyline and citalopram    Diverticulosis 2017    Per colonoscopy    Dry eye syndrome 2010    Moisturizers    Heart murmur Mitrovalve    Herpes zoster     Hip arthrosis ?    History of exposure to tuberculosis Remote    PPDs 1993 onward all negative    Hypertension 2006    Impacted cerumen     Irritable bowel syndrome     Low back strain Ongoing    MVP (mitral valve prolapse) 1985    Recurring tachycardias    Nephrolithiasis 1985    Plantar fasciitis     Sarcoidosis 2003    Active in eyes and lungs    Shingles     Sleep disturbances 1990    Good response to amitriptyline and trazodone    Uterine bleeding 2006    Uterine ablation for bleeding and chronic iron deficiency      Past Surgical History:   Procedure Laterality Date    BREAST BIOPSY Right 1988    Benign    CATARACT EXTRACTION Bilateral       SECTION  1985 & 1987    COLONOSCOPY  ,     Diverticulosis only.    COLONOSCOPY W/ POLYPECTOMY  Remote    DILATATION AND CURETTAGE  2016    ENDOMETRIAL ABLATION      HYSTEROSCOPY ENDOMETRIAL ABLATION  2006    persistant bleeding    MASTECTOMY Bilateral 2010    bilateral with implants, breast cancer on the right      Family History   Problem Relation Age of Onset    Alzheimer's disease Mother          age 89    Hypothyroidism Mother     Osteoporosis Mother     Pulmonary embolism Mother     Anxiety disorder Mother     Coronary artery disease Father     Dementia Father     Diabetes Father     Hypertension Father     Heart attack Father     Hypothyroidism Sister     Hypertension Sister     Breast cancer Maternal Grandmother     Stomach cancer Paternal Aunt     Lung cancer Paternal Aunt      Social History     Socioeconomic History    Marital status:      Spouse name: Woody   Tobacco Use    Smoking status: Never    Smokeless tobacco: Never   Vaping Use    Vaping status: Never Used   Substance and Sexual Activity    Alcohol use: Yes     Alcohol/week: 5.0 standard drinks of alcohol     Types: 5 Glasses of wine per week    Drug use: No    Sexual activity: Not Currently     Partners: Male      Current Outpatient Medications on File Prior to Visit   Medication Sig Dispense Refill    ALPRAZolam (XANAX) 0.5 MG tablet       atorvastatin (LIPITOR) 10 MG tablet Take 1 tablet by mouth Every Night. 90 tablet 3    Calcium Carbonate 1500 (600 Ca) MG tablet Calcium 600-D3 Plus (mag-zinc)   daily      cycloSPORINE (RESTASIS) 0.05 % ophthalmic emulsion Apply 1 drop to eye(s) as directed by provider Every 12 (Twelve) Hours.      enalapril (VASOTEC) 20 MG tablet Take 1 tablet by mouth Every Night. 90 tablet 1    fexofenadine (ALLEGRA) 180 MG tablet Take 1 tablet by mouth Daily As Needed.      hydrOXYzine (ATARAX) 25 MG tablet Take 1-2 tablets by mouth Every 6 (Six) Hours As Needed for Anxiety. 60 tablet 3    ibuprofen  "(ADVIL,MOTRIN) 400 MG tablet Take 1 tablet by mouth 2 (Two) Times a Day As Needed for Mild Pain . 180 tablet 1    Multiple Vitamin (MULTIVITAMIN) capsule Take  by mouth daily.      polyvinyl alcohol-povidone (HYPOTEARS) 1.4-0.6 % ophthalmic solution Apply 1 drop to eye(s) as directed by provider 2 (Two) Times a Day.      sertraline (ZOLOFT) 100 MG tablet Take 1 tablet by mouth Daily. 90 tablet 3    sertraline (ZOLOFT) 25 MG tablet Take 1 tablet by mouth Daily. 90 tablet 1    traZODone (DESYREL) 100 MG tablet Take 1 tablet by mouth Every Night. 90 tablet 3    vitamin C (ASCORBIC ACID) 250 MG tablet Take 1 tablet by mouth Daily.       No current facility-administered medications on file prior to visit.      Allergies   Allergen Reactions    Penicillins Rash    Sulfa Antibiotics Rash        Review of Systems   Constitutional: Negative.    HENT: Negative.     Eyes: Negative.    Respiratory: Negative.     Cardiovascular: Negative.    Gastrointestinal: Negative.    Endocrine: Negative.    Genitourinary: Negative.    Musculoskeletal:  Positive for arthralgias.   Skin: Negative.    Allergic/Immunologic: Negative.    Neurological: Negative.    Hematological: Negative.    Psychiatric/Behavioral: Negative.          The patient's Review of Systems was personally reviewed and confirmed as accurate.    Physical Exam  Blood pressure 118/74, height 160 cm (62.99\"), weight 58.8 kg (129 lb 9.6 oz), not currently breastfeeding.    Body mass index is 22.96 kg/m².    GENERAL APPEARANCE: awake, alert, oriented, in no acute distress and well developed, well nourished  LUNGS:  breathing nonlabored  EXTREMITIES: no clubbing, cyanosis  PERIPHERAL PULSES: palpable dorsalis pedis and posterior tibial pulses bilaterally.    GAIT:  Antalgic            Hip Exam:  Right     RANGE OF MOTION:  EXTENSION/FLEXION:  normal (0-110 degrees)  IR (at 90 degrees of flexion):   15  ER (at 90 degrees of flexion):  40  PAIN WITH HIP MOTION:  no  PAIN WITH " LOGROLL:  no      [facility] TEST: negative     STRENGTH:  ABDUCTOR:    5/5  ADDUCTOR:  5/5  HIP FLEXION:  5/5     GREATER TROCHANTER BURSAL PAIN:  yes    SENSATION TO LIGHT TOUCH:  DEEP PERONEAL/SUPERFICIAL PERONEAL/SURAL/SAPHENOUS/TIBIAL:   intact    EDEMA:  no  ERYTHEMA:  no  WOUNDS/INCISIONS:   no  _________________________________________________________________  _________________________________________________________________    RADIOGRAPHIC FINDINGS:   No new imaging today    Assessment/Plan:   Diagnosis Plan   1. Greater trochanteric bursitis of right hip        2. Primary osteoarthritis of right hip          Patient experiencing trochanteric bursitis of the right hip.  She does have moderate but asymptomatic hip arthritis well-preserved and pain-free range of motion.  I discussed home exercise program for hip strengthening and IT band stretching.  She is agreeable to that.  She is also agreeable to trochanteric bursa cortisone injection the right hip today.  She will follow-up as needed.    Procedure Note:  I discussed with the patient the potential benefits of performing a therapeutic injection of the right hip trochanteric bursa as well as potential risks including but not limited to infection, swelling, pain, bleeding, bruising, nerve/vessel damage, skin color changes, transient elevation in blood glucose levels, and fat atrophy. After informed consent and verifying correct patient, procedure site, and type of procedure, the area was prepped with alcohol, ethyl chloride was used to numb the skin. Via the direct lateral approach, 3 cc of 1% lidocaine, 3 cc of 0.25% Marcaine and 2 cc of 40mg/ml of Kenalog were injected into the right hip trochanteric bursa. The patient tolerated the procedure well. There were no complications. A sterile dressing was placed over the injection site.      Dao Stover MD  06/03/25  15:20 EDT

## 2025-08-06 ENCOUNTER — TELEPHONE (OUTPATIENT)
Age: 70
End: 2025-08-06
Payer: MEDICARE